# Patient Record
Sex: FEMALE | Race: WHITE | NOT HISPANIC OR LATINO | Employment: FULL TIME | URBAN - METROPOLITAN AREA
[De-identification: names, ages, dates, MRNs, and addresses within clinical notes are randomized per-mention and may not be internally consistent; named-entity substitution may affect disease eponyms.]

---

## 2018-01-24 LAB
CREAT SERPL-MCNC: 0.79 MG/DL (ref 0.58–0.96)
GFR SERPL CREATININE-BSD FRML MDRD: >60 ML/MIN/1.73
GLUCOSE SERPL-MCNC: 79 MG/DL (ref 74–99)
POTASSIUM SERPL-SCNC: 4.4 MMOL/L (ref 3.7–5.1)

## 2018-06-01 LAB
ALT SERPL-CCNC: 28 U/L (ref 7–38)
AST SERPL-CCNC: 26 U/L (ref 13–35)
CREAT SERPL-MCNC: 0.64 MG/DL (ref 0.58–0.96)
GFR SERPL CREATININE-BSD FRML MDRD: >60 ML/MIN/1.73M2
GLUCOSE SERPL-MCNC: 96 MG/DL (ref 74–99)
POTASSIUM SERPL-SCNC: 4.1 MMOL/L (ref 3.7–5.1)

## 2018-06-05 LAB
C TRACH DNA SPEC QL PROBE+SIG AMP: NEGATIVE
N GONORRHOEA DNA SPEC QL PROBE+SIG AMP: NEGATIVE
SPECIMEN DESCRIP: NORMAL
SPECIMEN DESCRIPTION: NORMAL

## 2018-07-12 LAB
HBV SURFACE AG SERPL QL IA: NEGATIVE
HIV 1+2 AB+HIV1 P24 AG SERPL QL IA: NEGATIVE
RUBELLA ABY IGG: NORMAL
TSH SERPL-ACNC: 1.2 UU/ML (ref 0.4–5.5)

## 2018-07-13 LAB — HEP C HIM: NORMAL

## 2018-07-17 ENCOUNTER — TRANSFERRED RECORDS (OUTPATIENT)
Dept: HEALTH INFORMATION MANAGEMENT | Facility: CLINIC | Age: 38
End: 2018-07-17

## 2018-07-17 ENCOUNTER — MEDICAL CORRESPONDENCE (OUTPATIENT)
Dept: HEALTH INFORMATION MANAGEMENT | Facility: CLINIC | Age: 38
End: 2018-07-17

## 2018-07-23 ENCOUNTER — TRANSFERRED RECORDS (OUTPATIENT)
Dept: HEALTH INFORMATION MANAGEMENT | Facility: CLINIC | Age: 38
End: 2018-07-23

## 2018-08-03 ENCOUNTER — PRE VISIT (OUTPATIENT)
Dept: MATERNAL FETAL MEDICINE | Facility: CLINIC | Age: 38
End: 2018-08-03

## 2018-08-05 NOTE — PROGRESS NOTES
Maternal-Fetal Medicine Consultation    Chelsea Stein  : 1980  MRN: [id number]    REFERRAL:  Chelsea Stein is a 38 year old sent by Dr. Laguerre for [facility] for SLE.    HPI:  Chelsea Stein is a 38 year old  at 16w1d by LMP consistent with 7w4d US presenting as a consult for SLE in pregnancy. She and her family just moved from [place] as her  took a faculty Neurosurgery job just after finishing fellowship.    She is doing well today, no complaints. Had nausea/vomiting in her first trimester but is feeling much better now. She has questions today about fetal echos and follow up with MFM vs routine Obstetrics.    She followed with a Rheumatologist in [place], her SLE has been well-controlled on Plaquenil with her last outbreak over two years ago. Notes that in her previous pregnancy she felt better than she does at baseline. No flair in postpartum period. She remembers getting a lot of fetal echos with previous pregnancy, but no fetal/ problems with last pregnancy. She did have a placenta previa with a bleed at 35w and was therefore admitted until delivery by c/s at 36w6d. This was complicated by need for pRBC transfusion x2 units.    Pregnancy complicated by:  - SLE, no renal involvement (Baseline Cr 0.79, Baseline P:Cr 0.1) - 300mg Plaquenil, ASA 81mg   - SSA positive >8.0, MAURICIO positive 1:1280, RNP Ab 7.3 (H), Sm Ab 4.7 (H)   - Joint pain, Raynaud's, Photosensitivity are main symptoms    - No steroid course for over two years   - Known Leukopenia, s/p bone marrow biopsy wnl, thought to be due to SLE   - Last seen by Rheumatology in [place] at 7w   - Leukopenia/Neutropenia with Left shift (2% blasts)   - S/p Hematology and BM Bx in [place], BMBx wnl   - Thought to be due to   - AMA - NIPT wnl  - Cervical polyp 3.5x2cm with occasional postcoital spotting    Prenatal Care:  Primary OB care this pregnancy has been with Dr. Laguerre at [facility]. She and  her family just moved to [place]    Dating:    LMP: 2018, cycles regular  Dating ultrasound: 7w5d (+3d) date 6w4d  GUNJAN: 19 by LMP      Obstetrics History:  Obstetric History       T0      L1     SAB0   TAB0   Ectopic0   Multiple0   Live Births1       # Outcome Date GA Lbr Curt/2nd Weight Sex Delivery Anes PTL Lv   2 Current            1   36w6d    CS-LTranv   JAYNE        Previous delivery - complete previa, bedrest at 35 wk. Had 2U pRBC during time of delivery    Gynecologic History:  - Last Pap: hx of negative paps, thinks last was NIL, in 2016 (no record)  - Denies any history of abnormal pap smears  - Denies prior cervical surgery or procedures  - Denies any history of frequent UTIs, vaginal infections, or STIs    Past Medical History:  Past Medical History:   Diagnosis Date     Lupus (systemic lupus erythematosus) (H)     SSA+       Past Surgical History:  C/s x1  No past surgical history on file.    Current Medications:  Plaquenil 300mg Daily   Aspirin 81mg Daily    Allergies:  Review of patient's allergies indicates not on file.    Social History:   Social History     Social History     Marital status:      Spouse name: N/A     Number of children: N/A     Years of education: N/A     Occupational History     Not on file.     Social History Main Topics     Smoking status: Not on file     Smokeless tobacco: Not on file     Alcohol use Not on file     Drug use: Not on file     Sexual activity: Not on file     Other Topics Concern     Not on file     Social History Narrative     No narrative on file       Family History:  No family history on file.    ROS:  10-point ROS negative except as in HPI     PHYSICAL EXAM:  LMP 2018    Gen: NAD, well appearing  Chest: Non-labored breathing  Extremities: WWP, no edema    Doptones: wnl by US today    Labs:    Lab Results   Component Value Date    CHPCRT Negative 2018    GCPCRT Negative 2018       No results found for:  PAP    Labs: 2018 from OSH  ABO A+, Ab neg  Hgb: 13  HCT: 37.6  MCV: 90.2 Plt: 187  Hep B neg, Hep C neg, HIV neg, Trep: neg, Rubella Immune  GC/Chlam: neg  Urine Cx: neg  Pap/HPV: unknown, per patient NIL,    TSH: 1.2  AST: 26  ALT: 28  Cr: 0.64  UPC: 0.1     Genetic Testing:   Pt reported NIPT wnl, femal    Ultrasounds:   DATE  GA  ASSESSMENT  18  7w5d  Dating  18  16w3d  2/3 Complete: see report for details    ASSESSMENT:  38 year old y.o.  at 16w1d by LMP c/w 7w US with well-controlled SLE on Planquenil.    RECOMMENDATIONS:    Systemic Lupus Erythematosus    The prognosis for both mother and child is best when SLE has been quiescent for at least 6 months prior to the pregnancy, and the patient's underlying renal function is stable and normal or near normal. The major issues with SLE are exacerbation of the disease, superimposed preeclampsia, fetal growth restriction, fetal loss, and  lupus. There may be a slight increase for flares during pregnancy, although the data are conflicting.   There is an increased risk of a lupus flare in the immediate postpartum period.      Positive SSA Antibody:  Another major issue is related to co-existing antibodies which lead to specific complications.  Antibodies to Ro and La are associated with fetal heart block, and antibodies associated with the antiphospholipid antibody syndrome (APS) are associated with poor obstetric outcomes and thrombosis.    Data from several observational studies suggest that antimalarial drugs, such as hydroxycholorquine (Plaquenil), are safe and there is significant experience with their use in pregnancy. Glucocorticoids are also relatively safe though they are associated with hypertension, diabetes and  premature rupture of membranes. Importantly, SLE flares themselves are associated with poor outcome, and the risk of the medications is thought to be less than the risk of a flare itself.      It is sometimes  "difficult to differentiate an SLE flare from preeclampsia therefore baseline testing is important.      Recommendations:    Continue all necessary medications continue Plaquenil    Low dose aspirin for preeclampsia prevention pt already taking    ECG, with echocardiogram if abnormal    Thyroid function (TSH, reflex to free T4) 1.2    Antibodies to Ro and La (SSA, SSB) - pt is SSA positive    Antiphospholipid antibody syndrome testing if other signs and symptoms of APS:  - Lupus anticoagulant negative  - Anticardiolipin antibodies IgG, IgM negative IgG and IgM  - Anti-beta2 glycoprotein 1 antibodies IgG, IgM negative IgG and IgM    Serial ultrasound for fetal growth after 24 weeks    Due to positive SSA antibody and 2-5% risk of fetal heart block. Will need serial fetal echo f/u   - Fetal echo at 18w   - Fetal echo at 20-21w, plan for comprehensive anatomy scan at this visit   - Fetal echo at 25-26w   - q2w limited US for fetal MI interval measurements    Frequent visits to assess blood pressure and urine protein    Weekly  testing with BPP (or NST and MVP) starting at 32 weeks    Delivery by 39-40 weeks - previous c/s. Pt thinks she's desiring TOLAC, which would be acceptable.     Stress dose steroids during labor if chronic steroids are necessary during the pregnancy.    Recommend she establish care with a rheumatologist here     Discussed with Chelsea that she qualifies for home doppler study due to SLE and positive SSA. Dr. Francisco will be in contact with her at next ultrasound.     # Routine Prenatal care  - Will need to establish routine OB care - can follow with routine OB plan for care and plan for US f/u with [facility]    I acted as a scribe for Dr. Mireille Yoder MD  Obstetrics and Gyncology, PGY-2  2018 10:21 AM     Please see \"Imaging\" tab under \"Chart Review\" for details of today's US at the [facility].    Total time spent in face -to face counseling was 15 minutes (>50% for " medical management discussion and plan of care). A copy of this consult was sen to the [facility] office.     This note, as scribed, accurately reflects the examination, my impressions and plan as discussed with the patient.    Black Kendrick MD  Maternal-Fetal Medicine

## 2018-08-07 ENCOUNTER — OFFICE VISIT (OUTPATIENT)
Dept: MATERNAL FETAL MEDICINE | Facility: CLINIC | Age: 38
End: 2018-08-07
Payer: COMMERCIAL

## 2018-08-07 ENCOUNTER — HOSPITAL ENCOUNTER (OUTPATIENT)
Dept: ULTRASOUND IMAGING | Facility: CLINIC | Age: 38
Discharge: HOME | End: 2018-08-07
Admitting: OBSTETRICS & GYNECOLOGY
Payer: COMMERCIAL

## 2018-08-07 DIAGNOSIS — O09.522 SUPERVISION OF ELDERLY MULTIGRAVIDA IN SECOND TRIMESTER: Primary | ICD-10-CM

## 2018-08-07 DIAGNOSIS — O26.90 PREGNANCY RELATED CONDITION, ANTEPARTUM: ICD-10-CM

## 2018-08-07 DIAGNOSIS — M32.9 SYSTEMIC LUPUS ERYTHEMATOSUS AFFECTING PREGNANCY (H): Primary | ICD-10-CM

## 2018-08-07 DIAGNOSIS — R76.8 SS-A ANTIBODY POSITIVE: ICD-10-CM

## 2018-08-07 DIAGNOSIS — O99.891 SYSTEMIC LUPUS ERYTHEMATOSUS AFFECTING PREGNANCY (H): Primary | ICD-10-CM

## 2018-08-07 PROCEDURE — 96040 ZZH GENETIC COUNSELING, EACH 30 MINUTES: CPT | Mod: ZF | Performed by: GENETIC COUNSELOR, MS

## 2018-08-07 PROCEDURE — 76805 OB US >/= 14 WKS SNGL FETUS: CPT

## 2018-08-07 NOTE — MR AVS SNAPSHOT
After Visit Summary   8/7/2018    Chelsea Stein    MRN: [id number]           Patient Information     Date Of Birth          1980        Visit Information        Provider Department      8/7/2018 8:00 AM Alexa Larios GC [facility] Maternal Fetal Medicine - [place]        Today's Diagnoses     Supervision of elderly multigravida in second trimester    -  1    Pregnancy related condition, antepartum           Follow-ups after your visit        Your next 10 appointments already scheduled     Aug 14, 2018  9:00 AM CDT   Ech Fetal Complete* with URFETR1   [facility] Echo/EKG ([facility])    [address] [place] [address]  [facility] [address]               Sep 04, 2018  9:00 AM CDT   Ech Fetal Complete* with URFETR1   [facility] Echo/EKG ([facility])    [address] [place] [address]  [facility] [address]               Sep 04, 2018 10:15 AM CDT   MFM US COMP with URMFMUSR3   [facility] Maternal Fetal Medicine Ultrasound - [facility])    [address]  [facility] [id number] [phone]           Wear comfortable clothes and leave your valuables at home.            Sep 04, 2018 10:45 AM CDT   Radiology MD with FELICIA CONDON MD   [facility] Maternal Fetal Medicine - [facility])    [address]  [facility] [address]   [phone]           Please arrive at the time given for your first appointment. This visit is used internally to schedule the physician's time during your ultrasound.            Sep 17, 2018  9:30 AM CDT   MFM US OB LIMIT SIN/MULT with SHMFMUSR2   [facility] Ultrasound - [facility])    [address] [place] [id number] [phone]           Wear comfortable clothes and leave your valuables at home.            Sep 17, 2018 10:00 AM CDT   Radiology MD with JOSSIE CONDON MD   [facility]  Maternal Fetal Medicine - [facility] ([facility])    [address]  [address]  [facility] [address] [phone]           Please arrive at the time given for your first appointment. This visit is used internally to schedule the physician's time during your ultrasound.            Oct 02, 2018  9:00 AM CDT   Ech Fetal Complete* with URFETR1   [facility] Echo/EKG ([facility])    [address] [place]  [facility] [address]               Oct 02, 2018 10:15 AM CDT   [facility] US COMPRE SINGLE F/U with URMFMUSR1   [facility] Maternal Fetal Medicine Ultrasound - [place] ([facility])    [address]  [facility] [id number] [phone]           Wear comfortable clothes and leave your valuables at home.            Oct 02, 2018 10:45 AM CDT   Radiology MD with UR M MD   [facility] Maternal Fetal Medicine - [place] ([facility])    [address]  [facility] [address]   [phone]           Please arrive at the time given for your first appointment. This visit is used internally to schedule the physician's time during your ultrasound.              Future tests that were ordered for you today     Open Future Orders        Priority Expected Expires Ordered    Echo Fetal Complete-Peds Cardiology Routine  8/7/2019 8/7/2018    Echo Fetal Complete-Peds Cardiology Routine  8/7/2019 8/7/2018    Echo Fetal Complete-Peds Cardiology Routine  8/7/2019 8/7/2018    [facility] US Comprehensive Single Routine  6/7/2019 8/7/2018    [facility] US OB Limited Single/Multiple Routine 9/14/2018 6/7/2019 8/7/2018    [facility] US Comprehensive Single F/U Routine 9/28/2018 8/7/2019 8/7/2018            Who to contact     If you have questions or need follow up information about today's clinic visit or your schedule please contact [facility] MATERNAL FETAL MEDICINE [facility] directly at [phone].  Normal or non-critical lab and  imaging results will be communicated to you by MyChart, letter or phone within 4 business days after the clinic has received the results. If you do not hear from us within 7 days, please contact the clinic through [employer] or phone. If you have a critical or abnormal lab result, we will notify you by phone as soon as possible.  Submit refill requests through "[employer]" or call your pharmacy and they will forward the refill request to us. Please allow 3 business days for your refill to be completed.          Additional Information About Your Visit        "2,10E+07"harZingCheckout Information     "[employer] gives you secure access to your electronic health record. If you see a primary care provider, you can also send messages to your care team and make appointments. If you have questions, please call your primary care clinic.  If you do not have a primary care provider, please call [phone] and they will assist you.        Care EveryWhere ID     This is your Care EveryWhere ID. This could be used by other organizations to access your [facility] medical records  [id number]        Your Vitals Were     Last Period                   04/14/2018            Blood Pressure from Last 3 Encounters:   No data found for BP    Weight from Last 3 Encounters:   No data found for Wt              We Performed the Following     MFM Genetic Counseling        Primary Care Provider    None Specified       No primary provider on file.        Equal Access to Services     HECTOR GAONA : Noe Knox, cristel schumacher, yaima lema, nikki gong. So [facility] [phone].    ATENCIÓN: Si habla español, tiene a parker disposición servicios gratuitos de asistencia lingüística. Llame al [phone].    We comply with applicable federal civil rights laws and [place] laws. We do not discriminate on the basis of race, color, national origin, age, disability, sex, sexual orientation, or gender identity.             Thank you!     Thank you for choosing [facility]  for your care. Our goal is always to provide you with excellent care. Hearing back from our patients is one way we can continue to improve our services. Please take a few minutes to complete the written survey that you may receive in the mail after your visit with us. Thank you!             Your Updated Medication List - Protect others around you: Learn how to safely use, store and throw away your medicines at www.disposemymeds.org.      Notice  As of 8/7/2018  1:28 PM    You have not been prescribed any medications.

## 2018-08-07 NOTE — NURSING NOTE
Chelsea here today with her son for GC/MFM consult and 2/3 complete U/S due to preg c/b SLE, +SSA and AMA. Pt reports no FM yet, denies ctx, denies SROM, and denies vag bleeding.  Pt had [facility]IPT in [facility] and recently moved to [place]. Dr. Kendrick in to see pt. Pt to have fetal echo at 16-18 wks, 20-21 wks and 25-26 wks.  Pt needs comp U/S in 3-4 wk, then limited U/S every 2 weeks checking FL intervals. Pt will get an apt with [facility] and establish prenatal care with [facility]. Pt leaving for vacation from 8/16-8/31.  Pt was scheduled for appointments and left amb and stable. Myrtle Cabezas RN

## 2018-08-07 NOTE — PROGRESS NOTES
[facility]  Genetic Counseling Consult    Patient: Chelsea Stein YOB: 1980   Date of Service: 18      Chelsea Stein was seen at [facility] for genetic consultation as part of her MF consultation today. The indication for genetic counseling is advanced maternal age.        Impression/Plan:   1.  Chelsea had an ultrasound and MFM consultation today. Her family has recently relocated to [place] from [place] and she has come to establish OB care with us due to her history of Lupus. See MFM consultation note for detailed discussion.      2.  Chelsea has had a normal non-invasive prenatal screen (NIPT) via [id number] through her OB provider in [place]. A copy of this result was requested from her OB provider for review and record completion. Per Chelsea's report, they were informed of female sex.    Pregnancy History:   /Parity:    Age at Delivery: 38 year old  GUNJAN: 2019, by Last Menstrual Period  Gestational Age: 16w3d    No significant complications or exposures were reported in the current pregnancy.    Chelsea s pregnancy history is significant for:  o 2014: 36+6,  due to complete previa, male  - Required two blood transfusions at the time of     Medical History:   Chelsea has a personal diagnosis of Lupus with ongoing joint pain, photosensitivity, and Raynaud's. She has not required steroid treatment for over 2 years. Her diagnosis of Lupus was discussed in detail during her MFM consultation. See their note.       Family History:   A three-generation pedigree was obtained, and is scanned under the  Media  tab.   The following significant findings were reported by Chelsea:    hCelsea's partner, Yordan, has a family history of Retinitis Pigmentosa (RP). RP refers to a group of eye disorders that oftentimes begin with loss of night vision and progress to loss of peripheral vision and legal  "blindness. Most individuals with RP begin experiencing symptoms in childhood, however, Chelsea's mother-in-law reportedly did not begin having symptoms until later in life. The most common forms of RP are inherited in an autosomal dominant pattern, meaning only one copy of the gene needs to be altered to have the disorder. Less commonly, RP can be diagnosed with an autosomal recessive pattern or X-linked. Per Chelsea's report, her mother-in-law is the first individual in her family to have RP. Testing was recommended for her close relatives, therefore, Chelsea's  completed testing. Per Chelsea, Yordan was found to be a \"carrier\" for RP but \"is not at risk\" himself for developing symptoms of RP. With the given description today, it sounds as though her mother-in-law developed RP due to autosomal recessive RP and that Yordan is an unaffected carrier of this disorder. In this scenario, there is a chance that Chelsea and Yordan's children could be unaffected carriers for RP or could have RP if Chelsea is also an unaffected carrier.    Otherwise, the reported family history is negative for multiple miscarriages, stillbirths, birth defects, mental retardation, known genetic conditions, and consanguinity.       Carrier Screening:   The patient reports that she and the father of the pregnancy have  ancestry:      Cystic fibrosis is an autosomal recessive genetic condition that occurs with increased frequency in individuals of  ancestry and carrier screening for this condition is available.  In addition,  screening in the [facility] includes cystic fibrosis.      Expanded carrier screening for mutations in a large panel of genes associated with autosomal recessive conditions including cystic fibrosis, spinal muscular atrophy, and others, is now available.      If Chelsea and/or her partner choose to pursue carrier screening, [facility] clinic is available to assist in coordination if desired.      "   Risk Assessment for Chromosome Conditions:   We explained that the risk for fetal chromosome abnormalities increases with maternal age. We discussed specific features of common chromosome abnormalities, including Down syndrome, trisomy 13, trisomy 18, and sex chromosome trisomies. These estimates are no longer accurate given the normal NIPT result.      - At age 38 at midtrimester, the risk to have a baby with Down syndrome is 1 in 168.     - At age 38 at midtrimester, the risk to have a baby with any chromosome abnormality is 1 in 82.     Testing Options:   We discussed the following options:   Genetic Amniocentesis    Invasive procedure typically performed in the second trimester by which amniotic fluid is obtained for the purpose of chromosome analysis and/or other prenatal genetic analysis    Diagnostic results; >99% sensitivity for fetal chromosome abnormalities    AFAFP measurement tests for open neural tube defects     Comprehensive (Level II) ultrasound: Detailed ultrasound performed between 18-22 weeks gestation to screen for major birth defects and markers for aneuploidy.      We reviewed the benefits and limitations of this testing.  Screening tests provide a risk assessment specific to the pregnancy for certain fetal chromosome abnormalities, but cannot definitively diagnose or exclude a fetal chromosome abnormality.  Follow-up genetic counseling and consideration of diagnostic testing is recommended with any abnormal screening result. Diagnostic tests carry inherent risks- including risk of miscarriage- that require careful consideration.  These tests can detect fetal chromosome abnormalities with greater than 99% certainty.  Results can be compromised by maternal cell contamination or mosaicism, and are limited by the resolution of cytogenetic G-banding technology.  There is no screening nor diagnostic test that can detect all forms of birth defects or mental disability.     It was a pleasure to be  involved with Chelsea s care. Face-to-face time of the meeting was 25 minutes.    Lana Larios MS, [facility]  Maternal Fetal Medicine  [facility]  Ph: [phone]  [email or web address]

## 2018-08-07 NOTE — MR AVS SNAPSHOT
After Visit Summary   8/7/2018    Chelsea Stein    MRN: [id number]           Patient Information     Date Of Birth          1980        Visit Information        Provider Department      8/7/2018 9:30 AM Black Kendrick MD [facility] Maternal Fetal Medicine - [place]        Today's Diagnoses     Systemic lupus erythematosus affecting pregnancy (H)    -  1    SS-A antibody positive           Follow-ups after your visit        Your next 10 appointments already scheduled     Aug 14, 2018  9:00 AM CDT   Ech Fetal Complete* with URFETR1   [facility] Echo/EKG ([facility])    [address]  [facility] [address]               Sep 04, 2018  9:00 AM CDT   Ech Fetal Complete* with URFETR1   [facility] Echo[facility]KG ([facility])    [address]  [facility] [address]               Sep 04, 2018 10:15 AM CDT   MFM US COMP with URMFMUSR3   [facility] Ultrasound - [facility])    [address]  [facility] [id number] [phone]           Wear comfortable clothes and leave your valuables at home.            Sep 04, 2018 10:45 AM CDT   Radiology MD with FELICIA CONDON MD   [facility] - [facility])    [address]  [facility] [address]   [phone]           Please arrive at the time given for your first appointment. This visit is used internally to schedule the physician's time during your ultrasound.            Sep 17, 2018  9:30 AM CDT   MFM US OB LIMIT SIN/MULT with SHMFMUSR2   [facility] Ultrasound - [facility])    [address] [place] [id number] [phone]           Wear comfortable clothes and leave your valuables at home.            Sep 17, 2018 10:00 AM CDT   Radiology MD with JOSSIE CONDON MD   [facility] Maternal Fetal Medicine -  [facility]    [address]  [address]  [facility] [address] [phone]           Please arrive at the time given for your first appointment. This visit is used internally to schedule the physician's time during your ultrasound.            Oct 02, 2018  9:00 AM CDT   Ech Fetal Complete* with URFETR1   [facility] Echo[facility]EKG ([facility])    [address]  [facility] [address]               Oct 02, 2018 10:15 AM CDT   [facility] US COMPRE SINGLE F/U with URMFMUSR1   [facility] Maternal Fetal Medicine Ultrasound - [place] ([facility])    [address]  [facility] [phone] [phone]           Wear comfortable clothes and leave your valuables at home.            Oct 02, 2018 10:45 AM CDT   Radiology MD with UR M MD   [facility] Maternal Fetal Medicine - [place] ([facility])    [address]  [facility] [address]   [phone]           Please arrive at the time given for your first appointment. This visit is used internally to schedule the physician's time during your ultrasound.              Future tests that were ordered for you today     Open Future Orders        Priority Expected Expires Ordered    Echo Fetal Complete-Peds Cardiology Routine  8/7/2019 8/7/2018    Echo Fetal Complete-Peds Cardiology Routine  8/7/2019 8/7/2018    Echo Fetal Complete-Peds Cardiology Routine  8/7/2019 8/7/2018    [facility] US Comprehensive Single Routine  6/7/2019 8/7/2018    [facility] US OB Limited Single/Multiple Routine 9/14/2018 6/7/2019 8/7/2018    [facility] US Comprehensive Single F/U Routine 9/28/2018 8/7/2019 8/7/2018            Who to contact     If you have questions or need follow up information about today's clinic visit or your schedule please contact [facility] MATERNAL FETAL MEDICINE [facility] directly at [phone].  Normal or non-critical lab and imaging results will be  communicated to you by fypiohart, letter or phone within 4 business days after the clinic has received the results. If you do not hear from us within 7 days, please contact the clinic through [email or web address] or phone. If you have a critical or abnormal lab result, we will notify you by phone as soon as possible.  Submit refill requests through [employer] or call your pharmacy and they will forward the refill request to us. Please allow 3 business days for your refill to be completed.          Additional Information About Your Visit        fypiohar[employer] Information     [employer] gives you secure access to your electronic health record. If you see a primary care provider, you can also send messages to your care team and make appointments. If you have questions, please call your primary care clinic.  If you do not have a primary care provider, please call [phone] and they will assist you.        Care EveryWhere ID     This is your Care EveryWhere ID. This could be used by other organizations to access your [place] medical records  [id number]        Your Vitals Were     Last Period                   04/14/2018            Blood Pressure from Last 3 Encounters:   No data found for BP    Weight from Last 3 Encounters:   No data found for Wt              We Performed the Following     MFM Office Visit        Primary Care Provider    None Specified       No primary provider on file.        Equal Access to Services     [facility] : Hadii gildardo Knox, cristel schumacher, yaima lema, nikki gong. So [facility] [phone].    ATENCIÓN: Si habla español, tiene a parker disposición servicios gratuitos de asistencia lingüística. Edenilson al [phone].    We comply with applicable federal civil rights laws and [place] laws. We do not discriminate on the basis of race, color, national origin, age, disability, sex, sexual orientation, or gender identity.            Thank you!     Thank you for  choosing [facility]  for your care. Our goal is always to provide you with excellent care. Hearing back from our patients is one way we can continue to improve our services. Please take a few minutes to complete the written survey that you may receive in the mail after your visit with us. Thank you!             Your Updated Medication List - Protect others around you: Learn how to safely use, store and throw away your medicines at www.disposemymeds.org.      Notice  As of 8/7/2018  1:12 PM    You have not been prescribed any medications.

## 2018-08-08 ENCOUNTER — HEALTH MAINTENANCE LETTER (OUTPATIENT)
Age: 38
End: 2018-08-08

## 2018-08-14 ENCOUNTER — HOSPITAL ENCOUNTER (OUTPATIENT)
Dept: CARDIOLOGY | Facility: CLINIC | Age: 38
Discharge: HOME | End: 2018-08-14
Attending: OBSTETRICS & GYNECOLOGY | Admitting: OBSTETRICS & GYNECOLOGY
Payer: COMMERCIAL

## 2018-08-14 DIAGNOSIS — R76.8 SS-A ANTIBODY POSITIVE: ICD-10-CM

## 2018-08-14 PROCEDURE — 76827 ECHO EXAM OF FETAL HEART: CPT

## 2018-09-04 ENCOUNTER — HOSPITAL ENCOUNTER (OUTPATIENT)
Dept: CARDIOLOGY | Facility: CLINIC | Age: 38
Discharge: HOME | End: 2018-09-04
Attending: OBSTETRICS & GYNECOLOGY | Admitting: OBSTETRICS & GYNECOLOGY
Payer: COMMERCIAL

## 2018-09-04 ENCOUNTER — HOSPITAL ENCOUNTER (OUTPATIENT)
Dept: ULTRASOUND IMAGING | Facility: CLINIC | Age: 38
End: 2018-09-04
Attending: OBSTETRICS & GYNECOLOGY
Payer: COMMERCIAL

## 2018-09-04 ENCOUNTER — OFFICE VISIT (OUTPATIENT)
Dept: MATERNAL FETAL MEDICINE | Facility: CLINIC | Age: 38
End: 2018-09-04
Attending: OBSTETRICS & GYNECOLOGY
Payer: COMMERCIAL

## 2018-09-04 DIAGNOSIS — O99.891 SYSTEMIC LUPUS ERYTHEMATOSUS AFFECTING PREGNANCY (H): Primary | ICD-10-CM

## 2018-09-04 DIAGNOSIS — R76.8 SS-A ANTIBODY POSITIVE: ICD-10-CM

## 2018-09-04 DIAGNOSIS — M32.9 SYSTEMIC LUPUS ERYTHEMATOSUS AFFECTING PREGNANCY (H): Primary | ICD-10-CM

## 2018-09-04 PROCEDURE — 76811 OB US DETAILED SNGL FETUS: CPT

## 2018-09-04 PROCEDURE — 76827 ECHO EXAM OF FETAL HEART: CPT

## 2018-09-04 NOTE — PROGRESS NOTES
"Please see \"Imaging\" tab under Chart Review for full details.    Kamila Buenrostro MD  Maternal Fetal Medicine    "

## 2018-09-04 NOTE — MR AVS SNAPSHOT
After Visit Summary   9/4/2018    Chelsea Stein    MRN: [id number]           Patient Information     Date Of Birth          1980        Visit Information        Provider Department      9/4/2018 10:45 AM Kamila Buenrostro MD [facility] Maternal [facility]etal [facility] - [place]        Today's Diagnoses     Systemic lupus erythematosus affecting pregnancy (H)    -  1       Follow-ups after your visit        Your next 10 appointments already scheduled     Sep 17, 2018  9:30 AM CDT   MFM US OB LIMIT SIN/MULT with SHMFMUSR2   [facility] - [facility] ([facility])    [address] [place] [address] [address]  [facility] [id number] [phone]           Wear comfortable clothes and leave your valuables at home.            Sep 17, 2018 10:00 AM CDT   Radiology MD with JOSSIE CONDON MD   [facility] Maternal Fetal Medicine - [facility] ([facility])    [address] [place] [address]  [facility] [id number] [phone]           Please arrive at the time given for your first appointment. This visit is used internally to schedule the physician's time during your ultrasound.            Sep 18, 2018  1:00 PM CDT   Nurse Visit with [facility] Nurse   [facility] ([facility])    [place] [address]  [address]  [facility] [address]   [phone]            Sep 18, 2018  1:30 PM CDT   NEW OB with Jazmine Pal MD   [facility] ([facility])    [place] [facility] [address]  [address]  [facility] [address]   [phone]            Oct 02, 2018  9:00 AM CDT   Ech Fetal Complete* with URFETR1   [facility] Echo/EKG ([facility])    [address] [place] [address]  [facility] [address]               Oct 02, 2018 10:15 AM CDT   MFM US COMPRE SINGLE F/U with URMFMUSR1   [facility] [place] ([facility])    [address]  [facility] [address]   [phone]           Wear comfortable clothes and leave your valuables at home.            Oct 02, 2018 10:45 AM CDT   Radiology MD with UR TAURUS ECKERT   [facility] Maternal Fetal Medicine [facility] ([facility])    [address]  [facility] [address]   [phone]           Please arrive at the time given for your first appointment. This visit is used internally to schedule the physician's time during your ultrasound.              Who to contact     If you have questions or need follow up information about today's clinic visit or your schedule please contact [facility] MATERNAL FETAL MEDICINE [facility] directly at [phone].  Normal or non-critical lab and imaging results will be communicated to you by MyChart, letter or phone within 4 business days after the clinic has received the results. If you do not hear from us within 7 days, please contact the clinic through Betablehart or phone. If you have a critical or abnormal lab result, we will notify you by phone as soon as possible.  Submit refill requests through [employer] or call your pharmacy and they will forward the refill request to us. Please allow 3 business days for your refill to be completed.          Additional Information About Your Visit        [employer] Information     [employer] gives you secure access to your electronic health record. If you see a primary care provider, you can also send messages to your care team and make appointments. If you have questions, please call your primary care clinic.  If you do not have a primary care provider, please call [phone] and they will assist you.        Care EveryWhere ID     This is your Care EveryWhere ID. This could be used by other organizations to access your [place] medical records  [id number]        Your Vitals Were     Last Period                   04/14/2018            Blood Pressure  from Last 3 Encounters:   No data found for BP    Weight from Last 3 Encounters:   No data found for Wt              Today, you had the following     No orders found for display       Primary Care Provider    None Specified       No primary provider on file.        Equal Access to Services     HECTOR GAONA : Hadii aad ku hadalaynamilton Lisette, cristel schumacher, yaima kaminna lema, nikki barnett perlaevelyn schwab thuan gong. So [facility] [phone].    ATENCIÓN: Si habla español, tiene a parker disposición servicios gratuitos de asistencia lingüística. Llame al [phone].    We comply with applicable federal civil rights laws and [place] laws. We do not discriminate on the basis of race, color, national origin, age, disability, sex, sexual orientation, or gender identity.            Thank you!     Thank you for choosing [facility]  for your care. Our goal is always to provide you with excellent care. Hearing back from our patients is one way we can continue to improve our services. Please take a few minutes to complete the written survey that you may receive in the mail after your visit with us. Thank you!             Your Updated Medication List - Protect others around you: Learn how to safely use, store and throw away your medicines at www.disposemymeds.org.      Notice  As of 9/4/2018 11:42 AM    You have not been prescribed any medications.

## 2018-09-04 NOTE — PROGRESS NOTES
Fetal Cardiology Consultation    Patient:  Chelsea Stein MRN:  [id number]   YOB: 1980 Age:  38 year old   Date of Visit:  2018 PCP:  No primary care provider on file.   GUNJAN: 2019, by Last Menstrual Period EGA: 20w3d weeks     Dear Dr. Kendrick:    I had the pleasure of seeing Chelsea Stein at the [facility] in [place] on 2018 in ongoing consultation for fetal echocardiography results. She presented today by herself. As you know, she is a 38 year old female with systemic lupus erythematosis.    The fetal echocardiogram was normal. Normal fetal heart 1:1 atrioventricular rhythm with normal mechanical OR interval. Normal fetal cardiac anatomy. Normal right and left ventricular size and function without hypertrophy. No evidence of diastolic dysfunction. No pericardial effusion.     I reviewed and interpreted the fetal echocardiogram today. I discussed the normal results with Ms. Stein. While these results are normal, it is important to note that fetal echocardiography cannot exclude small atrial or ventricular septal defects, persistent ductus arteriosus, mild coarctation of the aorta, partial anomalous pulmonary venous return, minor anatomic valve anomalies, or coronary artery anomalies.     -- A follow-up fetal echocardiogram is recommended every 2 weeks through 28 weeks, as scheduled.  -- A post-verito 12-lead ECG is recommended.  -- Please contact the fetal cardiology service at [facility] immediately with any irregularity in fetal heart Doppler rhythm assessment, or with sustained bradycardia.    Thank you for allowing me to participate in Ms. Stein's care. Please don't hesitate to contact me or the Fetal Cardiology team at [facility] with any questions or concerns.     I spent a total of 15 minutes face-to-face with Ms. Stein during today's office visit. Over 50% of this time was spent counseling the patient and/or  coordinating care regarding the fetal echocardiography results.     Florentin Dinero  Pediatric Cardiology  [facility]  Phone [phone]

## 2018-09-07 ENCOUNTER — TELEPHONE (OUTPATIENT)
Dept: RHEUMATOLOGY | Facility: CLINIC | Age: 38
End: 2018-09-07

## 2018-09-07 NOTE — TELEPHONE ENCOUNTER
Health Call Center    Phone Message    May a detailed message be left on voicemail: yes    Reason for Call: Other: Pt is being referred to clinic for Lupus per her provider at the [facility]. Records from [facility] are in the chart but pt will have her previous rheumatologist at the [facility] fax her records for review. Please contact pt for scheduling. Thanks!     Action Taken: Message routed to:  [facility] Rheumatology

## 2018-09-17 ENCOUNTER — HOSPITAL ENCOUNTER (OUTPATIENT)
Dept: ULTRASOUND IMAGING | Facility: CLINIC | Age: 38
Discharge: HOME | End: 2018-09-17
Attending: OBSTETRICS & GYNECOLOGY | Admitting: OBSTETRICS & GYNECOLOGY
Payer: COMMERCIAL

## 2018-09-17 ENCOUNTER — OFFICE VISIT (OUTPATIENT)
Dept: MATERNAL FETAL MEDICINE | Facility: CLINIC | Age: 38
End: 2018-09-17
Attending: OBSTETRICS & GYNECOLOGY
Payer: COMMERCIAL

## 2018-09-17 ENCOUNTER — TELEPHONE (OUTPATIENT)
Dept: RHEUMATOLOGY | Facility: CLINIC | Age: 38
End: 2018-09-17

## 2018-09-17 DIAGNOSIS — M32.9 SYSTEMIC LUPUS COMPLICATING PREGNANCY (H): Primary | ICD-10-CM

## 2018-09-17 DIAGNOSIS — O99.891 SYSTEMIC LUPUS COMPLICATING PREGNANCY (H): Primary | ICD-10-CM

## 2018-09-17 DIAGNOSIS — R76.8 SS-A ANTIBODY POSITIVE: ICD-10-CM

## 2018-09-17 PROCEDURE — 76815 OB US LIMITED FETUS(S): CPT

## 2018-09-17 NOTE — MR AVS SNAPSHOT
After Visit Summary   9/17/2018    Chelsea Stein    MRN: [id number]           Patient Information     Date Of Birth          1980        Visit Information        Provider Department      9/17/2018 10:00 AM Gosia Mcclure MD [facility] Maternal Fetal Medicine [facility]        Today's Diagnoses     Systemic lupus complicating pregnancy (H)    -  1       Follow-ups after your visit        Additional Services     RHEUMATOLOGY REFERRAL       Your provider has referred you to: [facility]: Rheumatology Clinic - [place] [phone]   [email or web address]    Please be aware that coverage of these services is subject to the terms and limitations of your health insurance plan.  Call member services at your health plan with any benefit or coverage questions.      Please bring the following with you to your appointment:    (1) Any X-Rays, CTs or MRIs which have been performed.  Contact the facility where they were done to arrange for  prior to your scheduled appointment.    (2) List of current medications   (3) This referral request   (4) Any documents/labs given to you for this referral                  Your next 10 appointments already scheduled     Sep 18, 2018  1:00 PM CDT   Nurse Visit with [facility] Nurse   [facility] ([facility])    [place] [address]  [address]  [facility] [address] [phone]            Sep 18, 2018  1:30 PM CDT   NEW OB with Jazmine Pal MD   [facility] ([facility])    [place] [address]  [address]  [facility] [address] [phone]            Oct 02, 2018  9:00 AM CDT   Ech Fetal Complete* with URFETR1   [facility] Echo/EKG ([facility])    [address]  [facility] [address]               Oct 02, 2018 10:15 AM CDT   MFM US COMPRE SINGLE F/U with URMFMUSR1   [facility]  Maternal Fetal Medicine Ultrasound - [place] ([facility])    [address]  [facility] [id number] [phone]           Wear comfortable clothes and leave your valuables at home.            Oct 02, 2018 10:45 AM CDT   Radiology MD with UR TAURUS ECKERT   [facility] Maternal Fetal Medicine - [place] ([facility])    [address]  [facility] [address]   [phone]           Please arrive at the time given for your first appointment. This visit is used internally to schedule the physician's time during your ultrasound.              Who to contact     If you have questions or need follow up information about today's clinic visit or your schedule please contact [employer] MATERNAL FETAL MEDICINE [facility] directly at [phone].  Normal or non-critical lab and imaging results will be communicated to you by Terapeakhart, letter or phone within 4 business days after the clinic has received the results. If you do not hear from us within 7 days, please contact the clinic through Certesst or phone. If you have a critical or abnormal lab result, we will notify you by phone as soon as possible.  Submit refill requests through [employer] or call your pharmacy and they will forward the refill request to us. Please allow 3 business days for your refill to be completed.          Additional Information About Your Visit        Terapeakhar[employer] Information     [employer] gives you secure access to your electronic health record. If you see a primary care provider, you can also send messages to your care team and make appointments. If you have questions, please call your primary care clinic.  If you do not have a primary care provider, please call [phone] and they will assist you.        Care EveryWhere ID     This is your Care EveryWhere ID. This could be used by other organizations to access your [place] medical records  [id number]        Your Vitals Were      Last Period                   04/14/2018            Blood Pressure from Last 3 Encounters:   No data found for BP    Weight from Last 3 Encounters:   No data found for Wt              We Performed the Following     RHEUMATOLOGY REFERRAL        Primary Care Provider    None Specified       No primary provider on file.        Equal Access to Services     HECTOR GAONA : Hadii aad ku hadkeisha Knox, waerickda luqadaha, yaima kaalmada pita, nikki barnett perlaevelyn schwab laRegimona gong. So [facility] [phone].    ATENCIÓN: Si habla español, tiene a parker disposición servicios gratuitos de asistencia lingüística. Llame al [phone].    We comply with applicable federal civil rights laws and [place] laws. We do not discriminate on the basis of race, color, national origin, age, disability, sex, sexual orientation, or gender identity.            Thank you!     Thank you for choosing [facility]  for your care. Our goal is always to provide you with excellent care. Hearing back from our patients is one way we can continue to improve our services. Please take a few minutes to complete the written survey that you may receive in the mail after your visit with us. Thank you!             Your Updated Medication List - Protect others around you: Learn how to safely use, store and throw away your medicines at www.disposemymeds.org.      Notice  As of 9/17/2018  4:35 PM    You have not been prescribed any medications.

## 2018-09-17 NOTE — NURSING NOTE
Chelsea presents to [facility] for RL2 secondary to SLE. Denies LOF, vaginal bleeding or cramping. She is having some back pain at the end of the day. Positive fetal movement. During her [facility] consult, she was advised to establish care with a Rheumatologist in [place] ([place] recently moved to [place]). She has contacted them but has been unsuccessful in getting an appointment scheduled. I placed the Rheumatology referral and called the [facility] . I opted for the virtual hold and put in patients number for them to call her back. I encouraged Chelsea to call us if she doesn't hear back from a . A follow up echo and US is scheduled in 2 weeks.

## 2018-09-17 NOTE — TELEPHONE ENCOUNTER
[facility] Call Center    Phone Message    May a detailed message be left on voicemail: yes    Reason for Call: Other: Pt calling with a referral for Systemic lupus complicating pregnancy from Dr. Mcclure in family medicine. Writer explained new pt process and informed pt staff would contact in 1-3 weeks for an intake. Pt expressed understanding, and wanted to note she has previously been seen at [facility] in [place], as well as the [facility] in [place]. She will have her records faxed, writer gave referral team number. Please f/u with intake.    Action Taken: Message routed to:  [facility] Rheum

## 2018-09-17 NOTE — PROGRESS NOTES
"Please see \"Imaging\" tab under \"Chart Review\" for details of today's visit.    Gosia Mcclure    "

## 2018-09-18 ENCOUNTER — APPOINTMENT (OUTPATIENT)
Dept: OBGYN | Facility: CLINIC | Age: 38
End: 2018-09-18
Payer: COMMERCIAL

## 2018-09-18 ENCOUNTER — OFFICE VISIT (OUTPATIENT)
Dept: OBGYN | Facility: CLINIC | Age: 38
End: 2018-09-18
Payer: COMMERCIAL

## 2018-09-18 VITALS
HEIGHT: 63 IN | BODY MASS INDEX: 22.15 KG/M2 | HEART RATE: 63 BPM | WEIGHT: 125 LBS | SYSTOLIC BLOOD PRESSURE: 95 MMHG | DIASTOLIC BLOOD PRESSURE: 62 MMHG

## 2018-09-18 DIAGNOSIS — M32.9 SYSTEMIC LUPUS ERYTHEMATOSUS AFFECTING PREGNANCY (H): Primary | ICD-10-CM

## 2018-09-18 DIAGNOSIS — O99.891 SYSTEMIC LUPUS ERYTHEMATOSUS AFFECTING PREGNANCY (H): Primary | ICD-10-CM

## 2018-09-18 LAB
ALBUMIN UR-MCNC: NEGATIVE MG/DL
APPEARANCE UR: CLEAR
BACTERIA #/AREA URNS HPF: ABNORMAL /HPF
BILIRUB UR QL STRIP: NEGATIVE
COLOR UR AUTO: ABNORMAL
CREAT UR-MCNC: 33 MG/DL
GLUCOSE UR STRIP-MCNC: NEGATIVE MG/DL
HGB UR QL STRIP: NEGATIVE
KETONES UR STRIP-MCNC: NEGATIVE MG/DL
LEUKOCYTE ESTERASE UR QL STRIP: ABNORMAL
MUCOUS THREADS #/AREA URNS LPF: PRESENT /LPF
NITRATE UR QL: NEGATIVE
PH UR STRIP: 6.5 PH (ref 5–7)
PROT UR-MCNC: 0.08 G/L
PROT/CREAT 24H UR: 0.23 G/G CR (ref 0–0.2)
RBC #/AREA URNS AUTO: 0 /HPF (ref 0–2)
SOURCE: ABNORMAL
SP GR UR STRIP: 1.01 (ref 1–1.03)
SQUAMOUS #/AREA URNS AUTO: 4 /HPF (ref 0–1)
UROBILINOGEN UR STRIP-MCNC: NORMAL MG/DL (ref 0–2)
WBC #/AREA URNS AUTO: 1 /HPF (ref 0–5)

## 2018-09-18 PROCEDURE — 81001 URINALYSIS AUTO W/SCOPE: CPT | Performed by: OBSTETRICS & GYNECOLOGY

## 2018-09-18 PROCEDURE — 84156 ASSAY OF PROTEIN URINE: CPT | Performed by: OBSTETRICS & GYNECOLOGY

## 2018-09-18 PROCEDURE — G0463 HOSPITAL OUTPT CLINIC VISIT: HCPCS | Mod: ZF

## 2018-09-18 RX ORDER — PRENATAL VIT/IRON FUM/FOLIC AC 27MG-0.8MG
1 TABLET ORAL DAILY
COMMUNITY
End: 2021-08-27

## 2018-09-18 RX ORDER — HYDROXYCHLOROQUINE SULFATE 200 MG/1
TABLET, FILM COATED ORAL
Refills: 0 | Status: ON HOLD | COMMUNITY
Start: 2018-09-10 | End: 2018-11-20

## 2018-09-18 ASSESSMENT — PAIN SCALES - GENERAL: PAINLEVEL: NO PAIN (0)

## 2018-09-18 NOTE — PROGRESS NOTES
"OB Transfer of Care visit  2018     CC: Transferring OB care from [place]    HPI: Chelsea Stein is a 38 year old  at 22w3d by LMP consistent with 7w5d US who presents for her first [facility] clinic visit since moving to [place] 1 month ago from [place]. She has already been seen in the Maternal Fetal Medicine Clinic here because of her SLE with SSA antibodies; recommendations are noted below.     She is doing well today.  Has started to feel fetal movement is \"stronger every day.  In fact, she feels that this pregnancy is less stressful because she does not have a placenta previa. \" She denies contractions cramping, bleeding, leaking of fluid.  She continues to be fairly active running a couple miles every day.  She denies any SLE flare type symptoms, and has not had these in many years.    Endorses that she has been considering ANTOINETTE AC but has not done much reading about this yet..    Pregnancy complicated by:  - SLE, no renal involvement (Baseline Cr 0.79, Baseline P:Cr 0.1) - 300mg Plaquenil, ASA 81mg                         - SSA positive >8.0, MAURICIO positive 1:1280, RNP Ab 7.3 (H), Sm Ab 4.7 (H)                         - Joint pain, Raynaud's, Photosensitivity are main symptoms                          - No steroid course for over two years                         - Last seen by Rheumatology in [place] at 7w   - Leukopenia/Neutropenia with Left shift (2% blasts)                         - S/p Hematology and BM Bx in [place], BMBx wnl                         - Thought to be due to SLE  - AMA - NIPT wnl  - Cervical polyp 3.5x2cm with occasional postcoital spotting  - History of CS x1 for complete previa    - considering TOLAC    Hgb: 13.0 (note WBC 1.5)  ALT:  28  AST:  26   Plt:  183  ABO: A pos  Rae: Neg  HIV:  NR  Hep C: Neg  RPR:  NR  Ru:  Immune  G/C: neg  HBAg: neg    GCT:  Not yet done  GBS: Not yet done  UCx: No growth  TSH  1.2  UP:Cr  0.1  No proteinuria on UA  Pap:  None in chart, NIL " 2016    PMHx  SLE as above, diagnosed age 19    PSHx  CS x1  Orthopedic surgery on arm    Social  , recent arrivals to [place] from [place]  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  Working on licensing.    Feels safe emotionally/physically/financially      Current medications  Plaquenil 300 mg   ASA 81 mg    : Fetal echocardiogram with Dr. Dinero  -- A follow-up fetal echocardiogram is recommended every 2 weeks through 28 weeks, as scheduled.  -- A post- 12-lead ECG is recommended.  -- Please contact the fetal cardiology service at [facility] immediately with any irregularity in fetal heart Doppler rhythm assessment, or with sustained bradycardia.    : Nl anatomy, LA interval. Placenta posterior.       A/P  38 year old  at 22w3d here to establish prenatal care. Pregnancy complicated by     #) SLE: Recommendations per MFM  Continue all necessary medications continue Plaquenil    Low dose aspirin for preeclampsia prevention pt already taking    ECG, with echocardiogram if abnormal    Thyroid function (TSH, reflex to free T4) 1.2    Antibodies to Ro and La (SSA, SSB) - pt is SSA positive    Antiphospholipid antibody syndrome testing if other signs and symptoms of APS:    Lupus anticoagulant negative    Anticardiolipin antibodies IgG, IgM negative IgG and IgM    Anti-beta2 glycoprotein 1 antibodies IgG, IgM negative IgG and IgM    Serial ultrasound for fetal growth after 24 weeks    Due to positive SSA antibody and 2-5% risk of fetal heart block. Will need serial fetal echo f/u * per peds cardiology scheduled every 2 weeks as above* - message sent to Dr. Kendrick and Dr. Dinero                         - Fetal echo at 18w                         - Fetal echo at 20-21w, plan for comprehensive anatomy scan at this visit - normal anatomy, posterior placenta at 20w3d                         - Fetal echo at 25-26w                         - q2w limited US  for fetal TX interval measurements    Frequent visits to assess blood pressure and urine protein    Weekly  testing with BPP (or NST and MVP) starting at 32 weeks    Stress dose steroids during labor if chronic steroids are necessary during the pregnancy.    Recommend she establish care with a rheumatologist here     #) Routine pregnancy   - GCT 24-28w   - No concerns on prenatal labs as above, other than leukopenia, which has been addressed  - normal anatomy, posterior placenta at 20w3d    #) History of CS x1  - Considering TOLAC. Discussed briefly today, plan to readdress early third trimester.     Jazmine Pal MD PGY4  OB/Gyn  2018, 6:41 PM    I agree with note as above. The patient was seen in continuity clinic by the resident doctor.  Assessment and plan were jointly made.  Sammi Kam MD

## 2018-09-18 NOTE — NURSING NOTE
Chief Complaint   Patient presents with     Prenatal Care     CHAVA 22 weeks 3 days   Whit Reynoso LPN

## 2018-09-18 NOTE — MR AVS SNAPSHOT
After Visit Summary   9/18/2018    Chelsea Stein    MRN: [id number]           Patient Information     Date Of Birth          1980        Visit Information        Provider Department      9/18/2018 1:30 PM Jazmine Pal MD [facility]        Today's Diagnoses     Systemic lupus erythematosus affecting pregnancy (H)    -  1       Follow-ups after your visit        Your next 10 appointments already scheduled     Oct 02, 2018  9:00 AM CDT   Ech Fetal Complete* with [id number]   [facility] Echo/EKG ([facility])    [address]  [facility] [address]               Oct 02, 2018 10:15 AM CDT   MFM US COMPRE SINGLE F/U with [id number]   [facility])    [address]  [facility] [id number] [phone]           Wear comfortable clothes and leave your valuables at home.            Oct 02, 2018 10:45 AM CDT   Radiology MD with UR TAURUS ECKERT   [facility])    [address]  [facility] [id number] [phone]           Please arrive at the time given for your first appointment. This visit is used internally to schedule the physician's time during your ultrasound.            Oct 08, 2018 10:00 AM CDT   (Arrive by 9:45 AM)   New Patient Visit with Latoya Cartagena MD   [facility] Rheumatology ([facility])    [address]  [address]  [facility] [id number] [phone]            Oct 16, 2018 10:15 AM CDT   NEW OB with Ashanti Heart MD   [facility] ([facility])    [place] [address]  [address]  [facility] [id number] [phone]              Who to contact     Please call your clinic at [phone] to:    Ask questions about your health    Make or cancel  "appointments    Discuss your medicines    Learn about your test results    Speak to your doctor            Additional Information About Your Visit        [employer] Information     Zam[employer]ee gives you secure access to your electronic health record. If you see a primary care provider, you can also send messages to your care team and make appointments. If you have questions, please call your primary care clinic.  If you do not have a primary care provider, please call [phone] and they will assist you.      Zamzee is an electronic gateway that provides easy, online access to your medical records. With Zamzee, you can request a clinic appointment, read your test results, renew a prescription or communicate with your care team.     To access your existing account, please contact your [facility] or call [phone] for assistance.        Care EveryWhere ID     This is your Care EveryWhere ID. This could be used by other organizations to access your [place] medical records  [id number]        Your Vitals Were     Pulse Height Last Period Breastfeeding? BMI (Body Mass Index)       63 1.6 m (5' 3\") 04/14/2018 No 22.14 kg/m2        Blood Pressure from Last 3 Encounters:   09/18/18 95/62    Weight from Last 3 Encounters:   09/18/18 56.7 kg (125 lb)              We Performed the Following     Creatinine urine calculation only     Protein  random urine with Creat Ratio     Urinalysis        Primary Care Provider    None Specified       No primary provider on file.        Equal Access to Services     HECTOR GAONA : Hadii gildardo Knox, waaxda luqadaha, qaybta kaalmada adeegyada, nikki larose . So [facility] [phone].    ATENCIÓN: Si habla español, tiene a parker disposición servicios gratuitos de asistencia lingüística. Llame al [phone].    We comply with applicable federal civil rights laws and [place] laws. We do not discriminate on the basis of race, " color, national origin, age, disability, sex, sexual orientation, or gender identity.            Thank you!     Thank you for choosing [facility]  for your care. Our goal is always to provide you with excellent care. Hearing back from our patients is one way we can continue to improve our services. Please take a few minutes to complete the written survey that you may receive in the mail after your visit with us. Thank you!             Your Updated Medication List - Protect others around you: Learn how to safely use, store and throw away your medicines at www.disposemymeds.org.          This list is accurate as of 9/18/18 11:59 PM.  Always use your most recent med list.                   Brand Name Dispense Instructions for use Diagnosis    ASPIRIN PO      Take 81 mg by mouth daily        hydroxychloroquine 200 MG tablet    PLAQUENIL     TK ONE AND SS T PO QD        prenatal multivitamin plus iron 27-0.8 MG Tabs per tablet      Take 1 tablet by mouth daily

## 2018-09-24 NOTE — TELEPHONE ENCOUNTER
Lupus Patient Intake Form    Have you ever been diagnosed with SLE, Sjogren's, Connective Tissue disorder (systemic lupus erythematosus)? Yes at age 20 If yes, who diagnosed you? Patient does not remember What are your flare symptoms? Joint pain, rash on face, mouth sores    Have you seen a Rheumatologist in the past?  Yes at [facility] and [facility]     If yes, is this a second opinion or transfer of care? yes    Have you been diagnosed with Fibromyalgia? no    Who manages your care for this issue now? none     What is your most urgent concern at this time? Follow with Rheumatologist due to pregnancy    Have you seen any specialist related to the reason you are coming here? Yes see above    Where are we expecting records (labs, imaging or pathology) from? [facility]    Offered an appointment on 10/8/2018 with Dr. Cartagena, patient accepted    Rebecca Tran CMA  9/24/2018 3:11 PM

## 2018-10-02 ENCOUNTER — HOSPITAL ENCOUNTER (OUTPATIENT)
Dept: ULTRASOUND IMAGING | Facility: CLINIC | Age: 38
End: 2018-10-02
Attending: OBSTETRICS & GYNECOLOGY
Payer: COMMERCIAL

## 2018-10-02 ENCOUNTER — OFFICE VISIT (OUTPATIENT)
Dept: MATERNAL FETAL MEDICINE | Facility: CLINIC | Age: 38
End: 2018-10-02
Attending: OBSTETRICS & GYNECOLOGY
Payer: COMMERCIAL

## 2018-10-02 ENCOUNTER — HOSPITAL ENCOUNTER (OUTPATIENT)
Dept: CARDIOLOGY | Facility: CLINIC | Age: 38
Discharge: HOME | End: 2018-10-02
Attending: OBSTETRICS & GYNECOLOGY | Admitting: OBSTETRICS & GYNECOLOGY
Payer: COMMERCIAL

## 2018-10-02 DIAGNOSIS — R76.8 SS-A ANTIBODY POSITIVE: ICD-10-CM

## 2018-10-02 DIAGNOSIS — M32.9 SYSTEMIC LUPUS ERYTHEMATOSUS AFFECTING PREGNANCY IN SECOND TRIMESTER (H): Primary | ICD-10-CM

## 2018-10-02 DIAGNOSIS — O99.891 SYSTEMIC LUPUS ERYTHEMATOSUS AFFECTING PREGNANCY IN SECOND TRIMESTER (H): Primary | ICD-10-CM

## 2018-10-02 PROCEDURE — 76816 OB US FOLLOW-UP PER FETUS: CPT

## 2018-10-02 PROCEDURE — 76827 ECHO EXAM OF FETAL HEART: CPT

## 2018-10-02 NOTE — PROGRESS NOTES
Fetal Cardiology Consultation    Patient:  Chelsea Stein MRN:  [id number]   YOB: 1980 Age:  38 year old   Date of Visit:  10/2/2018 PCP:  No primary care provider on file.   GUNJAN: 2019, by Last Menstrual Period EGA: 24w3d weeks     Dear Dr. Kendrick:    I had the pleasure of seeing Chelsea Stein at the [facility] Fetal [facility]chocardiography Laboratory in [place] on 10/2/2018 in ongoing consultation for fetal echocardiography results. She presented today accompanied by her mother. As you know, she is a 38 year old female with systemic lupus erythematosis.    The fetal echocardiogram was normal. Normal fetal heart 1:1 atrioventricular rhythm with normal mechanical AK interval. Normal fetal cardiac anatomy. Normal right and left ventricular size and function without hypertrophy. No evidence of diastolic dysfunction. No pericardial effusion.     I reviewed and interpreted the fetal echocardiogram today. I discussed the normal results with Ms. Stein. While these results are normal, it is important to note that fetal echocardiography cannot exclude small atrial or ventricular septal defects, persistent ductus arteriosus, mild coarctation of the aorta, partial anomalous pulmonary venous return, minor anatomic valve anomalies, or coronary artery anomalies.     -- A follow-up fetal ultrasound confirming a normal mechanical AK interval (whether through MFM or pediatric cardiology) is recommended in 2 weeks; this can be her last check during pregnancy barring additional concerns.  -- A post-verito 12-lead ECG is recommended.    Thank you for allowing me to participate in Ms. Stein's care. Please don't hesitate to contact me or the Fetal Cardiology team at [facility] with any questions or concerns.     I spent a total of 10 minutes face-to-face with Ms. Stein during today's office visit. Over 50% of this time was spent counseling the patient and/or coordinating care  regarding the fetal echocardiography results.     Florentin Dinero  Pediatric Cardiology  [facility]  Phone [phone]

## 2018-10-02 NOTE — MR AVS SNAPSHOT
After Visit Summary   10/2/2018    Chelsea Stein    MRN: [id number]           Patient Information     Date Of Birth          1980        Visit Information        Provider Department      10/2/2018 10:45 AM Black Kendrick MD [facility] Maternal Fetal Medicine [facility]        Today's Diagnoses     Systemic lupus erythematosus affecting pregnancy in second trimester (H)    -  1    SS-A antibody positive           Follow-ups after your visit        Your next 10 appointments already scheduled     Oct 02, 2018 10:45 AM CDT   Radiology MD with Black Kendrick MD   [facility] Maternal Fetal Medicine [facility] ([facility])    [address]  [facility] [address]   [phone]           Please arrive at the time given for your first appointment. This visit is used internally to schedule the physician's time during your ultrasound.            Oct 08, 2018 10:00 AM CDT   (Arrive by 9:45 AM)   New Patient Visit with Latoya Cartagena MD   [facility] Rheumatology ([facility])    [address]  [address]  [facility] [id number] [phone]            Oct 16, 2018 10:15 AM CDT   NEW OB with Ashanti Heart MD   [facility] ([facility])    [place] [facility] [address] [address]  [address]  [facility] [id number] [phone]              Future tests that were ordered for you today     Open Future Orders        Priority Expected Expires Ordered    MFM US OB Limited Single/Multiple Routine 10/16/2018 8/2/2019 10/2/2018            Who to contact     If you have questions or need follow up information about today's clinic visit or your schedule please contact [facility]ERNAL FETAL MEDICINE [facility] directly at [phone].  Normal or non-critical lab and imaging results will be communicated to you by MyChart, letter or phone within 4 business days after the clinic has  received the results. If you do not hear from us within 7 days, please contact the clinic through [employer] or phone. If you have a critical or abnormal lab result, we will notify you by phone as soon as possible.  Submit refill requests through StackSafe or call your pharmacy and they will forward the refill request to us. Please allow 3 business days for your refill to be completed.          Additional Information About Your Visit        [employer] Information     StackSafe gives you secure access to your electronic health record. If you see a primary care provider, you can also send messages to your care team and make appointments. If you have questions, please call your primary care clinic.  If you do not have a primary care provider, please call [phone] and they will assist you.        Care EveryWhere ID     This is your Care EveryWhere ID. This could be used by other organizations to access your [place] medical records  [id number]        Your Vitals Were     Last Period                   04/14/2018            Blood Pressure from Last 3 Encounters:   09/18/18 95/62    Weight from Last 3 Encounters:   09/18/18 56.7 kg (125 lb)               Primary Care Provider    None Specified       No primary provider on file.        Equal Access to Services     HECTOR [facility] : Hadii gildardo Knox, cristel schumacher, yaima lema, nikki larose . So [facility] [phone].    ATENCIÓN: Si habla español, tiene a parker disposición servicios gratuitos de asistencia lingüística. Llame al [phone].    We comply with applicable federal civil rights laws and [place] laws. We do not discriminate on the basis of race, color, national origin, age, disability, sex, sexual orientation, or gender identity.            Thank you!     Thank you for choosing [facility]  for your care. Our goal is always to provide you with excellent care. Hearing back from our  patients is one way we can continue to improve our services. Please take a few minutes to complete the written survey that you may receive in the mail after your visit with us. Thank you!             Your Updated Medication List - Protect others around you: Learn how to safely use, store and throw away your medicines at www.disposemymeds.org.          This list is accurate as of 10/2/18 10:32 AM.  Always use your most recent med list.                   Brand Name Dispense Instructions for use Diagnosis    ASPIRIN PO      Take 81 mg by mouth daily        hydroxychloroquine 200 MG tablet    PLAQUENIL     TK ONE AND SS T PO QD        prenatal multivitamin plus iron 27-0.8 MG Tabs per tablet      Take 1 tablet by mouth daily

## 2018-10-02 NOTE — PROGRESS NOTES
"Please see \"Imaging\" tab under \"Chart Review\" for details of today's US at the [facility].    Black Kendrick MD  Maternal-Fetal Medicine      "

## 2018-10-15 ENCOUNTER — OFFICE VISIT (OUTPATIENT)
Dept: RHEUMATOLOGY | Facility: CLINIC | Age: 38
End: 2018-10-15
Attending: STUDENT IN AN ORGANIZED HEALTH CARE EDUCATION/TRAINING PROGRAM
Payer: COMMERCIAL

## 2018-10-15 VITALS
BODY MASS INDEX: 22.62 KG/M2 | WEIGHT: 127.7 LBS | OXYGEN SATURATION: 100 % | DIASTOLIC BLOOD PRESSURE: 65 MMHG | HEART RATE: 89 BPM | SYSTOLIC BLOOD PRESSURE: 97 MMHG | HEIGHT: 63 IN | TEMPERATURE: 97.8 F

## 2018-10-15 DIAGNOSIS — M35.00 SICCA SYNDROME (H): Primary | ICD-10-CM

## 2018-10-15 DIAGNOSIS — Z51.81 MEDICATION MONITORING ENCOUNTER: ICD-10-CM

## 2018-10-15 DIAGNOSIS — M32.9 SYSTEMIC LUPUS COMPLICATING PREGNANCY (H): ICD-10-CM

## 2018-10-15 DIAGNOSIS — O99.891 SYSTEMIC LUPUS COMPLICATING PREGNANCY (H): ICD-10-CM

## 2018-10-15 DIAGNOSIS — Z23 ENCOUNTER FOR IMMUNIZATION: ICD-10-CM

## 2018-10-15 PROCEDURE — G0463 HOSPITAL OUTPT CLINIC VISIT: HCPCS | Mod: 25,ZF

## 2018-10-15 PROCEDURE — G0008 ADMIN INFLUENZA VIRUS VAC: HCPCS | Mod: ZF

## 2018-10-15 PROCEDURE — 90686 IIV4 VACC NO PRSV 0.5 ML IM: CPT | Mod: ZF | Performed by: STUDENT IN AN ORGANIZED HEALTH CARE EDUCATION/TRAINING PROGRAM

## 2018-10-15 PROCEDURE — 25000128 H RX IP 250 OP 636: Mod: ZF | Performed by: STUDENT IN AN ORGANIZED HEALTH CARE EDUCATION/TRAINING PROGRAM

## 2018-10-15 RX ORDER — HYDROXYCHLOROQUINE SULFATE 200 MG/1
TABLET, FILM COATED ORAL
Qty: 130 TABLET | Refills: 3 | Status: SHIPPED | OUTPATIENT
Start: 2018-10-15 | End: 2019-06-10

## 2018-10-15 RX ORDER — FLUORIDE TOOTHPASTE
15 TOOTHPASTE DENTAL 4 TIMES DAILY PRN
Status: DISCONTINUED | OUTPATIENT
Start: 2018-10-15 | End: 2018-10-15

## 2018-10-15 RX ORDER — HYDROXYCHLOROQUINE SULFATE 200 MG/1
400 TABLET, FILM COATED ORAL DAILY
Qty: 60 TABLET | Refills: 3 | Status: SHIPPED | OUTPATIENT
Start: 2018-10-15 | End: 2018-10-15

## 2018-10-15 RX ADMIN — INFLUENZA A VIRUS A/MICHIGAN/45/2015 X-275 (H1N1) ANTIGEN (FORMALDEHYDE INACTIVATED), INFLUENZA A VIRUS A/SINGAPORE/INFIMH-16-0019/2016 IVR-186 (H3N2) ANTIGEN (FORMALDEHYDE INACTIVATED), INFLUENZA B VIRUS B/PHUKET/3073/2013 ANTIGEN (FORMALDEHYDE INACTIVATED), AND INFLUENZA B VIRUS B/MARYLAND/15/2016 BX-69A ANTIGEN (FORMALDEHYDE INACTIVATED) 0.5 ML: 15; 15; 15; 15 INJECTION, SUSPENSION INTRAMUSCULAR at 12:27

## 2018-10-15 ASSESSMENT — PAIN SCALES - GENERAL: PAINLEVEL: NO PAIN (0)

## 2018-10-15 NOTE — LETTER
Date:November 5, 2018      Patient was self referred, no letter generated. Do not send.        [facility] Information

## 2018-10-15 NOTE — PATIENT INSTRUCTIONS
Welcome to [place] and congrats on your pregnancy! Today you have established care. You have a new prescription for plaquenil. Please have your labs drawn today. Please call or Mychart us if you notice any evidence of lupus flare. We will schedule you for future labs to be done sometime in January (you can get them done at [place]).    Labs today and again in early Jan     Eye exam in early March, refer to eye clinic    Return in early March

## 2018-10-15 NOTE — MR AVS SNAPSHOT
After Visit Summary   10/15/2018    Chelsea Stein    MRN: [id number]           Patient Information     Date Of Birth          1980        Visit Information        Provider Department      10/15/2018 11:00 AM Latoya Cartagena MD [facility] Rheumatology        Today's Diagnoses     Sicca syndrome (H)    -  1    Systemic lupus complicating pregnancy (H)        Encounter for immunization        Medication monitoring encounter          Care Instructions    Welcome to [place] and congrats on your pregnancy! Today you have established care. You have a new prescription for plaquenil. Please have your labs drawn today. Please call or Mychart us if you notice any evidence of lupus flare. We will schedule you for future labs to be done sometime in January (you can get them done at [facility]).    Labs today and again in early Jan     Eye exam in early March, refer to eye clinic    Return in early March              Follow-ups after your visit        Additional Services     OPHTHALMOLOGY ADULT REFERRAL       Your provider has referred you to: Dr Hale or Daisy for HCQ monitoring    Please be aware that coverage of these services is subject to the terms and limitations of your health insurance plan.  Call member services at your health plan with any benefit or coverage questions.      Please bring the following with you to your appointment:    (1) Any X-Rays, CTs or MRIs which have been performed.  Contact the facility where they were done to arrange for  prior to your scheduled appointment.    (2) List of current medications  (3) This referral request   (4) Any documents/labs given to you for this referral                  Follow-up notes from your care team     Return in about 5 months (around 3/15/2019) for Routine Visit - lupus.      Your next 10 appointments already scheduled     Nov 13, 2018  3:00 PM CST   RETURN OB with Nori Grimm MD   [facility])    [facility] [address] [address]  [address]  [address] [facility] [address]   [phone]            Nov 20, 2018 10:00 AM CST   (Arrive by 9:45 AM)   US FETAL BIOPHYS PROFILE W/O NON STRESS TEST with [id number]   [facility] ([facility])    [facility]  3rd Floor, [address]  [address] [facility] [address]   [phone]           How do I prepare for my exam? (Food and drink instructions) Drink four 8-ounce glasses of fluid an hour before your exam. If you need to empty your bladder before your exam, try to release only a little urine. Then, drink another glass of fluid.  How do I prepare for my exam? (Other instructions) You may have up to two family members in the exam room. If you bring a small child, an adult must be there to care for him or her. No video or camera photography during the procedure.  What should I wear: Wear comfortable clothes.  How long does the exam take: Most ultrasounds take 30 to 60 minutes.  What should I bring: Bring a list of your medicines, including vitamins, minerals and over-the-counter drugs. It is safest to leave personal items at home.  Do I need a :  No  is needed.  What do I need to tell my doctor: Tell your doctor about any allergies you may have.  What should I do after the exam: No restrictions, You may resume normal activities.  What is this test: An ultrasound uses sound waves to make pictures of the body. Sound waves do not cause pain. The only discomfort may be the pressure of the wand against your skin or full bladder.  Who should I call with questions: If you have any questions, please call the Imaging Department where you will have your exam. Directions, parking instructions, and other information is available on our website, [email or web address].            Mar 04, 2019  8:30 AM CST   (Arrive by 8:15 AM)   Return Visit with MD BAO Hunter  "[facility] Rheumatology ([facility])    [address]  [address]  [facility] [id number] [phone]            Mar 04, 2019  9:30 AM CST   NEW RETINA with Clare Montano MD   Eye Clinic ([facility])    [address]  [address]  [facility] [id number] [phone]              Who to contact     If you have questions or need follow up information about today's clinic visit or your schedule please contact [facility] RHEUMATOLOGY directly at [phone].  Normal or non-critical lab and imaging results will be communicated to you by [employer], letter or phone within 4 business days after the clinic has received the results. If you do not hear from us within 7 days, please contact the clinic through [employer] or phone. If you have a critical or abnormal lab result, we will notify you by phone as soon as possible.  Submit refill requests through Yasuu or call your pharmacy and they will forward the refill request to us. Please allow 3 business days for your refill to be completed.          Additional Information About Your Visit        [employer]ilharWundrbar Information     Yasuu gives you secure access to your electronic health record. If you see a primary care provider, you can also send messages to your care team and make appointments. If you have questions, please call your primary care clinic.  If you do not have a primary care provider, please call [phone] and they will assist you.        Care EveryWhere ID     This is your Care EveryWhere ID. This could be used by other organizations to access your [place] medical records  [id number]        Your Vitals Were     Pulse Temperature Height Last Period Pulse Oximetry BMI (Body Mass Index)    89 97.8  F (36.6  C) (Oral) 1.6 m (5' 3\") 04/14/2018 100% 22.62 kg/m2       Blood Pressure from Last 3 Encounters:   10/30/18 (!) 86/53   10/16/18 92/66   10/15/18 97/65    Weight from Last 3 " Encounters:   10/30/18 58.4 kg (128 lb 12.8 oz)   10/16/18 58 kg (127 lb 12.8 oz)   10/15/18 57.9 kg (127 lb 11.2 oz)              We Performed the Following     OPHTHALMOLOGY ADULT REFERRAL          Today's Medication Changes          These changes are accurate as of 10/15/18 11:59 PM.  If you have any questions, ask your nurse or doctor.               These medicines have changed or have updated prescriptions.        Dose/Directions    * hydroxychloroquine 200 MG tablet   Commonly known as:  PLAQUENIL   This may have changed:  Another medication with the same name was added. Make sure you understand how and when to take each.   Changed by:  Latoya Cartagena MD        TK ONE AND SS T PO QD   Refills:  0       * hydroxychloroquine 200 MG tablet   Commonly known as:  PLAQUENIL   This may have changed:  You were already taking a medication with the same name, and this prescription was added. Make sure you understand how and when to take each.   Used for:  Systemic lupus complicating pregnancy (H)   Changed by:  Latoya Cartagena MD        300 mg/day (1.5 pills) Annual Plaquenil toxicity eye screening required.   Quantity:  130 tablet   Refills:  3       * Notice:  This list has 2 medication(s) that are the same as other medications prescribed for you. Read the directions carefully, and ask your doctor or other care provider to review them with you.         Where to get your medicines      These medications were sent to [facility] [address] [place] [address] [address]  [address] [place] [address]    Hours:  24-hours Phone:  [phone]     hydroxychloroquine 200 MG tablet                Primary Care Provider    None Specified       No primary provider on file.        Equal Access to Services     [facility] : Noe Knox, cristel schumacher, nikki carey. So [facility] [phone].    ATENCIÓN: Si  andrey harkins, tiene a parker disposición servicios gratuitos de asistencia lingüística. Edenilson meza [phone].    We comply with applicable federal civil rights laws and [place] laws. We do not discriminate on the basis of race, color, national origin, age, disability, sex, sexual orientation, or gender identity.            Thank you!     Thank you for choosing [facility] RHEUMATOLOGY  for your care. Our goal is always to provide you with excellent care. Hearing back from our patients is one way we can continue to improve our services. Please take a few minutes to complete the written survey that you may receive in the mail after your visit with us. Thank you!             Your Updated Medication List - Protect others around you: Learn how to safely use, store and throw away your medicines at www.disposemymeds.org.          This list is accurate as of 10/15/18 11:59 PM.  Always use your most recent med list.                   Brand Name Dispense Instructions for use Diagnosis    ASPIRIN PO      Take 81 mg by mouth daily        * hydroxychloroquine 200 MG tablet    PLAQUENIL     TK ONE AND SS T PO QD        * hydroxychloroquine 200 MG tablet    PLAQUENIL    130 tablet    300 mg/day (1.5 pills) Annual Plaquenil toxicity eye screening required.    Systemic lupus complicating pregnancy (H)       IRON SUPPLEMENT PO      Take 65 mg by mouth daily        prenatal multivitamin plus iron 27-0.8 MG Tabs per tablet      Take 1 tablet by mouth daily        * Notice:  This list has 2 medication(s) that are the same as other medications prescribed for you. Read the directions carefully, and ask your doctor or other care provider to review them with you.

## 2018-10-15 NOTE — LETTER
10/15/2018      RE: Chelsea Stein  [address]  [address]  [place] [address]       Rheumatology Clinic - Fellow  Date: 10/15/18    Referral for: transfer of care, lupus, pregnat  Referred by: Dr. Scott Dorsey    HPI: 39 yo F [id number] with lupus who is transferring her care from [facility] and [facility]. She was diagnosed at 18/18 yo initially with raynaud's then in her 20s had a full flare and was diagnosed with lupus. Currently she is almost entirely asymptomatic on plaquenil though she notes significant dry mouth since about 1-2 weeks ago. She finds herself drinking frequently throughout the day and still her mouth feels dry. Her eyes do not feel dry.    She has not required steroids since 2016 though her disease is very steroid responsive. C4 levels and dsDNA levels do correspond to flares per patient. Chelsea is pregnant with her second child due  (she is at 26 weeks). Her first pregnancy went smoothly except for placenta previa requiring  at 36.5 weeks. Before she moved her persistent neutropenia was evaluated via BM biopsy which was unremarkable.    Nature of her lupus flare includes symptoms of: mild alopecia, joint pain, malar rash, oral ulcers, photosensitivity, raynaud's. No previous history of other rashes, dysphagia, pleurisy, chest pain, miscarriages, preeclampsia, eclampsia. She denies recent fevers, chills, pain, vision changes.    Previous management: Dr. Tiffany Parkinson ([place]) then Dr. Conde ([facility])  Note that we have care everywhere for the latter and have yet to receive records from the former.    ROS: Lupus review of systems completed and entirely negative at this time except for mild raynaud's symptoms.    No Known Allergies     Current Outpatient Prescriptions   Medication     ASPIRIN PO     Ferrous Sulfate (IRON SUPPLEMENT PO)     hydroxychloroquine (PLAQUENIL) 200 MG tablet     hydroxychloroquine (PLAQUENIL) 200 MG tablet     Prenatal Vit-Fe  "Fumarate-FA (PRENATAL MULTIVITAMIN PLUS IRON) 27-0.8 MG TABS per tablet     No current facility-administered medications for this visit.      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, nonsmoker, occasional drinker not at this time though, no drug use    Physical Exam:  BP 97/65  Pulse 89  Temp 97.8  F (36.6  C) (Oral)  Ht 1.6 m (5' 3\")  Wt 57.9 kg (127 lb 11.2 oz)  LMP 2018  SpO2 100%  BMI 22.62 kg/m2   General: NAD, very pleasant  HEENT: face symmetric, dry mucosa with decreased saliva pool despite drinking water within the last hour, lacrimal pool adequate  Lymph: no submandibular, cervical, supraclavicular LAD  CV: rrr, no mrg  Resp: CTAB  Abd: pregnant, nt, nd  MSK: no weakness, gait wnl, full active ROM of all joints without pain  Skin: no rashes, no malar rash, no petechia, no bruising  Psych: congruent mood and affect    Data reviewed in care everywhere and in EPIC    Assessment:  39 yo F with lupus here to transfer care, is pregnant, has new sicca symptoms.    Diagnosis:  1. SLE (MAURICIO+ 1:1280 speckled, Sm+, RNP+, SSA+, Chromatin Ab+, APS negative, low C4 which corresponds to flares as does dsDNA per patient, with raynaud's, arthralgias, ?malar rash, alopecia, oral ulcers, leukopenia)  2. Sicca symptoms (dry mouth)  3. Pregnancy, high risk, SSA+  4. Drug monitoring: Plaquenil long term use    Plan:  1. Continue plaquenil 300 mg daily.   Plaquenil started at 18 yo   Prescription renewed   Last eye exam: 18, no signs of plaquenil toxicity  2. High risk pregnancy, SSA+, no antiphospholipids, previous pregnancy without complications due to lupus   Has been seen by MFM   Agree with serial ultrasounds due to risk for heart block with SSA  3. Disease Monitoring   Last Upr/cr = 0.23 (mildly elevated) " (9/18/18)   C3, C4, dsDNA, CRP, ESR, CBC w/diff, LFTs q3mo   CH50 monitoring during pregnancy  4. Start biotene swish and swallow for dry mouth. Secretagogues are contraindicated in pregnancy.    Labs tomorrow per patient preference and in early January  RTC in early march, sooner if needed    Patient staffed with Dr. Westfall.    Latoya Cartagena MD, PhD  Rheumatology Fellow    Attending Note: I saw and evaluated the patient with Dr. Cartagena. I agree with the assessment and plan.    Corrina Westfall MD    Orders Placed This Encounter   Procedures     CBC with platelets differential     AST     ALT     Albumin level     Creatinine     CRP inflammation     Erythrocyte sedimentation rate auto     UA with Microscopic reflex to Culture     Complement C3     Complement C4     DNA double stranded antibodies     Protein  random urine with Creat Ratio     Uric acid     CH50 Complement total     OPHTHALMOLOGY ADULT REFERRAL           Latoya Cartagena MD

## 2018-10-15 NOTE — NURSING NOTE
"Injectable Influenza Immunization Documentation    1.  Has the patient received the information for the injectable influenza vaccine? YES     2. Is the patient 6 months of age or older? YES     3. Does the patient have any of the following contraindications?         Severe allergy to eggs? No     Severe allergic reaction to previous influenza vaccines? No   Severe allergy to latex? No       History of Guillain-Nassawadox syndrome? No     Currently have a temperature greater than 100.4F? No        4.  Severely egg allergic patients should have flu vaccine eligibility assessed by an MD, RN, or pharmacist, and those who received flu vaccine should be observed for 15 min by an MD, RN, Pharmacist, Medical Technician, or member of clinic staff.\": YES    5. Latex-allergic patients should be given latex-free influenza vaccine Yes. Please reference the Vaccine latex table to determine if your clinic s product is latex-containing.       Vaccination given by Chelsea Lopez [facility]  10/15/2018 12:26 PM            "

## 2018-10-15 NOTE — PROGRESS NOTES
Rheumatology Clinic - Fellow  Date: 10/15/18    Referral for: transfer of care, lupus, pregnat  Referred by: Dr. Scott Dorsey    HPI: 37 yo F [id number] with lupus who is transferring her care from [facility] and [facility]. She was diagnosed at 18/20 yo initially with raynaud's then in her 20s had a full flare and was diagnosed with lupus. Currently she is almost entirely asymptomatic on plaquenil though she notes significant dry mouth since about 1-2 weeks ago. She finds herself drinking frequently throughout the day and still her mouth feels dry. Her eyes do not feel dry.    She has not required steroids since 2016 though her disease is very steroid responsive. C4 levels and dsDNA levels do correspond to flares per patient. Chelsea is pregnant with her second child due  (she is at 26 weeks). Her first pregnancy went smoothly except for placenta previa requiring  at 36.5 weeks. Before she moved her persistent neutropenia was evaluated via BM biopsy which was unremarkable.    Nature of her lupus flare includes symptoms of: mild alopecia, joint pain, malar rash, oral ulcers, photosensitivity, raynaud's. No previous history of other rashes, dysphagia, pleurisy, chest pain, miscarriages, preeclampsia, eclampsia. She denies recent fevers, chills, pain, vision changes.    Previous management: Dr. Tiffany Parkinson ([place]) then Dr. Conde ([facility])  Note that we have care everywhere for the latter and have yet to receive records from the former.'    Has mild raynaud's.    ROS: A comprehensive ROS was done. Positives are per HPI.      No Known Allergies     Current Outpatient Prescriptions   Medication     ASPIRIN PO     Ferrous Sulfate (IRON SUPPLEMENT PO)     hydroxychloroquine (PLAQUENIL) 200 MG tablet     hydroxychloroquine (PLAQUENIL) 200 MG tablet     Prenatal Vit-Fe Fumarate-FA (PRENATAL MULTIVITAMIN PLUS IRON) 27-0.8 MG TABS per tablet     No current facility-administered  "medications for this visit.      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, nonsmoker, occasional drinker not at this time though, no drug use    Physical Exam:  BP 97/65  Pulse 89  Temp 97.8  F (36.6  C) (Oral)  Ht 1.6 m (5' 3\")  Wt 57.9 kg (127 lb 11.2 oz)  LMP 2018  SpO2 100%  BMI 22.62 kg/m2   General: NAD, very pleasant  HEENT: face symmetric, dry mucosa with decreased saliva pool despite drinking water within the last hour, lacrimal pool adequate  Lymph: no submandibular, cervical, supraclavicular LAD  CV: rrr, no mrg  Resp: CTAB  Abd: pregnant, nt, nd  MSK: no weakness, gait wnl, full active ROM of all joints without pain  Skin: no rashes, no malar rash, no petechia, no bruising  Psych: congruent mood and affect    Data reviewed in care everywhere and in EPIC    Assessment:  39 yo F with lupus here to transfer care, is pregnant, has new sicca symptoms.    Diagnosis:  1. SLE (MAURICIO+ 1:1280 speckled, Sm+, RNP+, SSA+, Chromatin Ab+, APS negative, low C4 which corresponds to flares as does dsDNA per patient, with raynaud's, arthralgias, ?malar rash, alopecia, oral ulcers, leukopenia)  2. Sicca symptoms (dry mouth)  3. Pregnancy, high risk, SSA+  4. Drug monitoring: Plaquenil long term use    Plan:  1. Continue plaquenil 300 mg daily.   Plaquenil started at 20 yo   Prescription renewed   Last eye exam: 18, no signs of plaquenil toxicity  2. High risk pregnancy, SSA+, no antiphospholipids, previous pregnancy without complications due to lupus   Has been seen by MFM   Agree with serial ultrasounds due to risk for heart block with SSA  3. Disease Monitoring   Last Upr/cr = 0.23 (mildly elevated) (18)   C3, C4, dsDNA, CRP, ESR, CBC w/diff, LFTs q3mo   CH50 monitoring during pregnancy  4. Start " biotene swish and swallow for dry mouth. Secretagogues are contraindicated in pregnancy.    Labs tomorrow per patient preference and in early January  RTC in early march, sooner if needed    Patient staffed with Dr. Westfall.    Latoya Cartagena MD, PhD  Rheumatology Fellow    Attending Note: I saw and evaluated the patient with Dr. Cartagena. I agree with the assessment and plan.    Corrina Westfall MD    Orders Placed This Encounter   Procedures     CBC with platelets differential     AST     ALT     Albumin level     Creatinine     CRP inflammation     Erythrocyte sedimentation rate auto     UA with Microscopic reflex to Culture     Complement C3     Complement C4     DNA double stranded antibodies     Protein  random urine with Creat Ratio     Uric acid     CH50 Complement total     OPHTHALMOLOGY ADULT REFERRAL

## 2018-10-16 ENCOUNTER — OFFICE VISIT (OUTPATIENT)
Dept: OBGYN | Facility: CLINIC | Age: 38
End: 2018-10-16
Attending: OBSTETRICS & GYNECOLOGY
Payer: COMMERCIAL

## 2018-10-16 VITALS
HEIGHT: 63 IN | BODY MASS INDEX: 22.64 KG/M2 | DIASTOLIC BLOOD PRESSURE: 66 MMHG | WEIGHT: 127.8 LBS | SYSTOLIC BLOOD PRESSURE: 92 MMHG | HEART RATE: 94 BPM

## 2018-10-16 DIAGNOSIS — Z34.92 PRENATAL CARE IN SECOND TRIMESTER: ICD-10-CM

## 2018-10-16 DIAGNOSIS — Z34.92 PRENATAL CARE IN SECOND TRIMESTER: Primary | ICD-10-CM

## 2018-10-16 DIAGNOSIS — D70.8 OTHER NEUTROPENIA (H): ICD-10-CM

## 2018-10-16 DIAGNOSIS — N84.1 CERVICAL POLYP: ICD-10-CM

## 2018-10-16 DIAGNOSIS — O09.529 SUPERVISION OF HIGH-RISK PREGNANCY OF ELDERLY MULTIGRAVIDA: Primary | ICD-10-CM

## 2018-10-16 LAB — GLUCOSE 1H P 50 G GLC PO SERPL-MCNC: 106 MG/DL (ref 60–129)

## 2018-10-16 PROCEDURE — 36415 COLL VENOUS BLD VENIPUNCTURE: CPT | Performed by: OBSTETRICS & GYNECOLOGY

## 2018-10-16 PROCEDURE — 82950 GLUCOSE TEST: CPT | Performed by: OBSTETRICS & GYNECOLOGY

## 2018-10-16 PROCEDURE — G0463 HOSPITAL OUTPT CLINIC VISIT: HCPCS

## 2018-10-16 NOTE — MR AVS SNAPSHOT
After Visit Summary   10/16/2018    Chelsea Stein    MRN: [id number]           Patient Information     Date Of Birth          1980        Visit Information        Provider Department      10/16/2018 10:15 AM Ashanti Heart MD [facility]        Today's Diagnoses     Supervision of high-risk pregnancy of elderly multigravida    -  1    Cervical polyp        Other neutropenia (H)           Follow-ups after your visit        Your next 10 appointments already scheduled     Oct 18, 2018  9:30 AM CDT   MFM US OB LIMIT SIN/MULT with [email or web address]   ealth Maternal Fetal Medicine Ultrasound - [facility] ([facility])    [address] [place] [address]  [facility] [id number] [phone]           Wear comfortable clothes and leave your valuables at home.            Oct 18, 2018 10:00 AM CDT   Radiology [facility]   ealth Maternal Fetal Medicine - [facility])    [address] [place] [address]  [facility] [id number] [phone]           Please arrive at the time given for your first appointment. This visit is used internally to schedule the physician's time during your ultrasound.            Oct 30, 2018  9:30 AM CDT   RETURN OB with Maci Bailey MD   [facility] ([facility])    [facility] [facility] [address]  [address]  [facility] [address] [phone]            Mar 04, 2019  8:30 AM CST   (Arrive by 8:15 AM)   Return Visit with Latoya Cartagena MD   [facility] Rheumatology ([facility])    [address]  [address]  [facility] [id number] [phone]            Mar 04, 2019  9:30 AM CST   NEW RETINA with Clare Montano MD   [facility] ([facility])    [address]  9[address] Clin [address]  [facility] [id number] [phone]              Who to contact     Please call  "your clinic at [phone] to:    Ask questions about your health    Make or cancel appointments    Discuss your medicines    Learn about your test results    Speak to your doctor            Additional Information About Your Visit        PictureMenuhart Information     [employer] gives you secure access to your electronic health record. If you see a primary care provider, you can also send messages to your care team and make appointments. If you have questions, please call your primary care clinic.  If you do not have a primary care provider, please call [phone] and they will assist you.      [employer] is an electronic gateway that provides easy, online access to your medical records. With [employer], you can request a clinic appointment, read your test results, renew a prescription or communicate with your care team.     To access your existing account, please contact your [facility] or call [phone] for assistance.        Care EveryWhere ID     This is your Care EveryWhere ID. This could be used by other organizations to access your [place] medical records  [id number]        Your Vitals Were     Pulse Height Last Period BMI (Body Mass Index)          94 1.6 m (5' 3\") 04/14/2018 22.64 kg/m2         Blood Pressure from Last 3 Encounters:   10/16/18 92/66   10/15/18 97/65   09/18/18 95/62    Weight from Last 3 Encounters:   10/16/18 58 kg (127 lb 12.8 oz)   10/15/18 57.9 kg (127 lb 11.2 oz)   09/18/18 56.7 kg (125 lb)              Today, you had the following     No orders found for display       Primary Care Provider    None Specified       No primary provider on file.        Equal Access to Services     HECTOR GAONA : Hadbrunilda Knox, waaxda luqadaha, qaybta kaalmada nikki lema. So [facility] [phone].    ATENCIÓN: Si habla español, tiene a parker disposición servicios gratuitos de asistencia lingüística. Llame al [phone].    We " comply with applicable federal civil rights laws and [place] laws. We do not discriminate on the basis of race, color, national origin, age, disability, sex, sexual orientation, or gender identity.            Thank you!     Thank you for choosing [facility]  for your care. Our goal is always to provide you with excellent care. Hearing back from our patients is one way we can continue to improve our services. Please take a few minutes to complete the written survey that you may receive in the mail after your visit with us. Thank you!             Your Updated Medication List - Protect others around you: Learn how to safely use, store and throw away your medicines at www.disposemymeds.org.          This list is accurate as of 10/16/18 11:59 PM.  Always use your most recent med list.                   Brand Name Dispense Instructions for use Diagnosis    ASPIRIN PO      Take 81 mg by mouth daily        * hydroxychloroquine 200 MG tablet    PLAQUENIL     TK ONE AND SS T PO QD        * hydroxychloroquine 200 MG tablet    PLAQUENIL    130 tablet    300 mg/day (1.5 pills) Annual Plaquenil toxicity eye screening required.    Systemic lupus complicating pregnancy (H)       IRON SUPPLEMENT PO      Take 65 mg by mouth daily        prenatal multivitamin plus iron 27-0.8 MG Tabs per tablet      Take 1 tablet by mouth daily        * Notice:  This list has 2 medication(s) that are the same as other medications prescribed for you. Read the directions carefully, and ask your doctor or other care provider to review them with you.

## 2018-10-16 NOTE — PROGRESS NOTES
"Return OB Visit  10/16/18       Subjective: Chelsea Stein is a 38 year old  at 26w3d by LMP consistent with 7w5d US who presents for CHAVA visit. KEM from [facility].     Denies ctx/VB/LOF. Good FM. Denies SLE flares, HAs, vision changes. No other c/o today.     Objective:  BP 92/66  Pulse 94  Ht 1.6 m (5' 3\")  Wt 58 kg (127 lb 12.8 oz)  LMP 2018  BMI 22.64 kg/m2  See OB flowsheet    A/P  38 year old  at 26w3d by LMP c/w 7w5d US, for CHAVA visit. Pregnancy complicated by:    #) SLE, no renal involvement:    - Normal flare-ups are joint pain, Raynaud's, photosensivity   - no steroid course in >2yrs   - last seen by Rheum at 7 weeks at [facility]   - Recommendations per [facility]  Continue all necessary medications continue Plaquenil    Low dose aspirin for preeclampsia prevention pt already taking    ECG, with echocardiogram if abnormal    Thyroid function (TSH, reflex to free T4) 1.2    Antibodies to Ro and La (SSA, SSB) - pt is SSA positive    Antiphospholipid antibody syndrome testing if other signs and symptoms of APS:    Lupus anticoagulant - negative    Anticardiolipin antibodies - negative    Anti-beta2 glycoprotein 1 antibodies - negative    Serial ultrasound for fetal growth after 24 weeks (next scheduled with [facility] 10/18)    Due to positive SSA antibody and 2-5% risk of fetal heart block.     S/p normal serial fetal echos thru 28wks    q2w limited US for fetal ME interval measurements (last one scheduled 10/18 per [facility])    Frequent visits to assess blood pressure and urine protein    Weekly  testing with BPP (or NST and MVP) starting at 32 weeks    Stress dose steroids during labor if chronic steroids are necessary during the pregnancy.    Recommend she establish care with a rheumatologist here     #) Leukopenia/Neutropenia with Left shift (2% blasts)   - S/p Hematology and BM Bx in [place], BMBx wnl   - Thought to be due to SLE    #) Cervical polyp 3.5x2cm with " occasional postcoital spotting   - can reassess at time of delivery if no problems    #) AMA   - NIPT wnl    #) Routine pregnancy   - GCT administered today   - No concerns on prenatal labs as above, other than leukopenia, which has been addressed  - normal anatomy, posterior placenta at 20w3d    #) History of CS x1 for placenta previa  - Considering TOLAC. Briefly discussed last visit, will continue to re-address      Ashanti Heart MD  10/16/2018

## 2018-10-16 NOTE — LETTER
"10/16/2018       RE: Chelsea Stein  [address]  [address]  [place] [place] [facility] [address]     Dear Colleague,    Thank you for referring your patient, Chelsea Stein, to the [facility] at [facility]. Please see a copy of my visit note below.    Return OB Visit  10/16/18       Subjective: Chelsea Stein is a 38 year old  at 26w3d by LMP consistent with 7w5d US who presents for CHAVA visit. KEM from [facility].     Denies ctx/VB/LOF. Good FM. Denies SLE flares, HAs, vision changes. No other c/o today.     Objective:  BP 92/66  Pulse 94  Ht 1.6 m (5' 3\")  Wt 58 kg (127 lb 12.8 oz)  LMP 2018  BMI 22.64 kg/m2  See OB flowsheet    A/P  38 year old  at 26w3d by LMP c/w 7w5d US, for CHAVA visit. Pregnancy complicated by:    #) SLE, no renal involvement:    - Normal flare-ups are joint pain, Raynaud's, photosensivity   - no steroid course in >2yrs   - last seen by Rheum at 7 weeks at [facility]   - Recommendations per MFM  Continue all necessary medications continue Plaquenil    Low dose aspirin for preeclampsia prevention pt already taking    ECG, with echocardiogram if abnormal    Thyroid function (TSH, reflex to free T4) 1.2    Antibodies to Ro and La (SSA, SSB) - pt is SSA positive    Antiphospholipid antibody syndrome testing if other signs and symptoms of APS:    Lupus anticoagulant  - negative    Anticardiolipin antibodies - negative    Anti-beta2 glycoprotein 1 antibodies - negative    Serial ultrasound for fetal growth after 24 weeks (next scheduled with [facility] 10/18)    Due to positive SSA antibody and 2-5% risk of fetal heart block.     S/p normal serial fetal echos thru 28wks    q2w limited US for fetal [place] interval measurements (last one scheduled 10/18 per [facility])    Frequent visits to assess blood pressure and urine protein    Weekly  testing with BPP (or NST and MVP) starting at 32 weeks    Stress dose steroids " during labor if chronic steroids are necessary during the pregnancy.    Recommend she establish care with a rheumatologist here      #) Leukopenia/Neutropenia with Left shift (2% blasts)   - S/p Hematology and BM Bx in [place], BMBx wnl   - Thought to be due to SLE    #) Cervical polyp 3.5x2cm with occasional postcoital spotting   - can reassess at time of delivery if no problems    #) AMA   - NIPT wnl    #) Routine pregnancy   - GCT administered today   - No concerns on prenatal labs as above, other than leukopenia, which has been addressed  - normal anatomy, posterior placenta at 20w3d    #) History of CS x1 for placenta previa  - Considering TOLAC. Briefly discussed last visit, will continue to re-address      Ashanti Heart MD  10/16/2018

## 2018-10-17 PROBLEM — D72.819 LEUKOPENIA: Status: ACTIVE | Noted: 2018-10-17

## 2018-10-17 PROBLEM — N84.1 CERVICAL POLYP: Status: ACTIVE | Noted: 2018-10-17

## 2018-10-17 PROBLEM — O09.529 SUPERVISION OF HIGH-RISK PREGNANCY OF ELDERLY MULTIGRAVIDA: Status: ACTIVE | Noted: 2018-10-17

## 2018-10-18 ENCOUNTER — OFFICE VISIT (OUTPATIENT)
Dept: MATERNAL FETAL MEDICINE | Facility: CLINIC | Age: 38
End: 2018-10-18
Attending: OBSTETRICS & GYNECOLOGY
Payer: COMMERCIAL

## 2018-10-18 ENCOUNTER — HOSPITAL ENCOUNTER (OUTPATIENT)
Dept: ULTRASOUND IMAGING | Facility: CLINIC | Age: 38
Discharge: HOME | End: 2018-10-18
Attending: OBSTETRICS & GYNECOLOGY | Admitting: OBSTETRICS & GYNECOLOGY
Payer: COMMERCIAL

## 2018-10-18 DIAGNOSIS — O99.891 SYSTEMIC LUPUS ERYTHEMATOSUS AFFECTING PREGNANCY IN SECOND TRIMESTER (H): ICD-10-CM

## 2018-10-18 DIAGNOSIS — R76.8 SS-A ANTIBODY POSITIVE: Primary | ICD-10-CM

## 2018-10-18 DIAGNOSIS — M32.9 SYSTEMIC LUPUS ERYTHEMATOSUS AFFECTING PREGNANCY IN SECOND TRIMESTER (H): ICD-10-CM

## 2018-10-18 PROCEDURE — 76815 OB US LIMITED FETUS(S): CPT

## 2018-10-18 NOTE — MR AVS SNAPSHOT
After Visit Summary   10/18/2018    Chelsea Stein    MRN: [id number]           Patient Information     Date Of Birth          1980        Visit Information        Provider Department      10/18/2018 10:00 AM Myrtle Lyn MD [facility] Maternal Fetal Medicine  [place]        Today's Diagnoses     SS-A antibody positive    -  1       Follow-ups after your visit        Your next 10 appointments already scheduled     Oct 30, 2018  9:30 AM CDT   RETURN OB with Maci Bailey MD   [facility] ([facility])    [facility] [address]  [address]  [address]  [facility] [address]   [phone]            Mar 04, 2019  8:30 AM CST   (Arrive by 8:15 AM)   Return Visit with Latoya Cartagena MD   [facility] Rheumatology ([facility])    [address]  [address]  [facility] [id number] [phone]            Mar 04, 2019  9:30 AM CST   NEW RETINA with Clare Montano MD   Eye Clinic ([facility])    [address]  [address]  [facility] [id number] [phone]              Who to contact     If you have questions or need follow up information about today's clinic visit or your schedule please contact [facility] MATERNAL FETAL MEDICINE  [place] directly at [phone].  Normal or non-critical lab and imaging results will be communicated to you by MyChart, letter or phone within 4 business days after the clinic has received the results. If you do not hear from us within 7 days, please contact the clinic through MyChart or phone. If you have a critical or abnormal lab result, we will notify you by phone as soon as possible.  Submit refill requests through [employer] or call your pharmacy and they will forward the refill request to us. Please allow 3 business days for your refill to be completed.          Additional Information About Your Visit         FanMob Information     [employer] gives you secure access to your electronic health record. If you see a primary care provider, you can also send messages to your care team and make appointments. If you have questions, please call your primary care clinic.  If you do not have a primary care provider, please call [phone] and they will assist you.        Care EveryWhere ID     This is your Care EveryWhere ID. This could be used by other organizations to access your [facility] medical records  [id number]        Your Vitals Were     Last Period                   04/14/2018            Blood Pressure from Last 3 Encounters:   10/16/18 92/66   10/15/18 97/65   09/18/18 95/62    Weight from Last 3 Encounters:   10/16/18 58 kg (127 lb 12.8 oz)   10/15/18 57.9 kg (127 lb 11.2 oz)   09/18/18 56.7 kg (125 lb)              Today, you had the following     No orders found for display       Primary Care Provider    None Specified       No primary provider on file.        Equal Access to Services     [facility]I : Hadii aad ku hadasho Soomaali, waaxda luqadaha, qaybta kaalmada adeegyada, waxay amairaniin anibal larose . So [facility] [phone].    ATENCIÓN: Si habla español, tiene a parker disposición servicios gratuitos de asistencia lingüística. Llame al [phone].    We comply with applicable federal civil rights laws and [place] laws. We do not discriminate on the basis of race, color, national origin, age, disability, sex, sexual orientation, or gender identity.            Thank you!     Thank you for choosing [facility]  for your care. Our goal is always to provide you with excellent care. Hearing back from our patients is one way we can continue to improve our services. Please take a few minutes to complete the written survey that you may receive in the mail after your visit with us. Thank you!             Your Updated Medication List - Protect others around you: Learn how to  safely use, store and throw away your medicines at www.disposemymeds.org.          This list is accurate as of 10/18/18 10:24 AM.  Always use your most recent med list.                   Brand Name Dispense Instructions for use Diagnosis    ASPIRIN PO      Take 81 mg by mouth daily        * hydroxychloroquine 200 MG tablet    PLAQUENIL     TK ONE AND SS T PO QD        * hydroxychloroquine 200 MG tablet    PLAQUENIL    130 tablet    300 mg/day (1.5 pills) Annual Plaquenil toxicity eye screening required.    Systemic lupus complicating pregnancy (H)       IRON SUPPLEMENT PO      Take 65 mg by mouth daily        prenatal multivitamin plus iron 27-0.8 MG Tabs per tablet      Take 1 tablet by mouth daily        * Notice:  This list has 2 medication(s) that are the same as other medications prescribed for you. Read the directions carefully, and ask your doctor or other care provider to review them with you.

## 2018-10-18 NOTE — PROGRESS NOTES
Please see ultrasound report under imaging tab for details on ultrasound performed today.    Myrtle Lyn MD  , OB/GYN  Maternal-Fetal Medicine  [email or web address]  [phone] (Academic office)  [phone] (Pager)

## 2018-10-30 ENCOUNTER — RADIANT APPOINTMENT (OUTPATIENT)
Dept: ULTRASOUND IMAGING | Facility: CLINIC | Age: 38
End: 2018-10-30
Attending: OBSTETRICS & GYNECOLOGY
Payer: COMMERCIAL

## 2018-10-30 ENCOUNTER — OFFICE VISIT (OUTPATIENT)
Dept: OBGYN | Facility: CLINIC | Age: 38
End: 2018-10-30
Attending: OBSTETRICS & GYNECOLOGY
Payer: COMMERCIAL

## 2018-10-30 VITALS
BODY MASS INDEX: 22.82 KG/M2 | HEART RATE: 102 BPM | HEIGHT: 63 IN | SYSTOLIC BLOOD PRESSURE: 86 MMHG | DIASTOLIC BLOOD PRESSURE: 53 MMHG | WEIGHT: 128.8 LBS

## 2018-10-30 DIAGNOSIS — M32.9 SYSTEMIC LUPUS COMPLICATING PREGNANCY (H): ICD-10-CM

## 2018-10-30 DIAGNOSIS — O34.219 HISTORY OF CESAREAN DELIVERY, ANTEPARTUM: ICD-10-CM

## 2018-10-30 DIAGNOSIS — O99.891 SYSTEMIC LUPUS COMPLICATING PREGNANCY (H): ICD-10-CM

## 2018-10-30 DIAGNOSIS — O09.93 HIGH-RISK PREGNANCY IN THIRD TRIMESTER: Primary | ICD-10-CM

## 2018-10-30 DIAGNOSIS — O09.93 HIGH-RISK PREGNANCY IN THIRD TRIMESTER: ICD-10-CM

## 2018-10-30 LAB
ALBUMIN SERPL-MCNC: 2.8 G/DL (ref 3.4–5)
ALBUMIN UR-MCNC: NEGATIVE MG/DL
ALT SERPL W P-5'-P-CCNC: 27 U/L (ref 0–50)
APPEARANCE UR: CLEAR
AST SERPL W P-5'-P-CCNC: 24 U/L (ref 0–45)
BACTERIA #/AREA URNS HPF: ABNORMAL /HPF
BASOPHILS # BLD AUTO: 0 10E9/L (ref 0–0.2)
BASOPHILS NFR BLD AUTO: 0 %
BILIRUB UR QL STRIP: NEGATIVE
C3 SERPL-MCNC: 95 MG/DL (ref 76–169)
C4 SERPL-MCNC: 14 MG/DL (ref 15–50)
COLOR UR AUTO: ABNORMAL
CREAT SERPL-MCNC: 0.45 MG/DL (ref 0.52–1.04)
CREAT UR-MCNC: 19 MG/DL
CRP SERPL-MCNC: <2.9 MG/L (ref 0–8)
DIFFERENTIAL METHOD BLD: ABNORMAL
EOSINOPHIL # BLD AUTO: 0 10E9/L (ref 0–0.7)
EOSINOPHIL NFR BLD AUTO: 0 %
ERYTHROCYTE [DISTWIDTH] IN BLOOD BY AUTOMATED COUNT: 14 % (ref 10–15)
ERYTHROCYTE [SEDIMENTATION RATE] IN BLOOD BY WESTERGREN METHOD: 27 MM/H (ref 0–20)
GFR SERPL CREATININE-BSD FRML MDRD: >90 ML/MIN/1.7M2
GLUCOSE UR STRIP-MCNC: NEGATIVE MG/DL
HCT VFR BLD AUTO: 31.3 % (ref 35–47)
HGB BLD-MCNC: 10.6 G/DL (ref 11.7–15.7)
HGB UR QL STRIP: NEGATIVE
IMM GRANULOCYTES # BLD: 0 10E9/L (ref 0–0.4)
IMM GRANULOCYTES NFR BLD: 0.6 %
KETONES UR STRIP-MCNC: NEGATIVE MG/DL
LEUKOCYTE ESTERASE UR QL STRIP: ABNORMAL
LYMPHOCYTES # BLD AUTO: 0.5 10E9/L (ref 0.8–5.3)
LYMPHOCYTES NFR BLD AUTO: 31.6 %
MCH RBC QN AUTO: 32.8 PG (ref 26.5–33)
MCHC RBC AUTO-ENTMCNC: 33.9 G/DL (ref 31.5–36.5)
MCV RBC AUTO: 97 FL (ref 78–100)
MONOCYTES # BLD AUTO: 0.2 10E9/L (ref 0–1.3)
MONOCYTES NFR BLD AUTO: 13.9 %
NEUTROPHILS # BLD AUTO: 0.9 10E9/L (ref 1.6–8.3)
NEUTROPHILS NFR BLD AUTO: 53.9 %
NITRATE UR QL: NEGATIVE
NRBC # BLD AUTO: 0 10*3/UL
NRBC BLD AUTO-RTO: 0 /100
PH UR STRIP: 7 PH (ref 5–7)
PLATELET # BLD AUTO: 112 10E9/L (ref 150–450)
PROT UR-MCNC: <0.05 G/L
PROT/CREAT 24H UR: NORMAL G/G CR (ref 0–0.2)
RBC # BLD AUTO: 3.23 10E12/L (ref 3.8–5.2)
RBC #/AREA URNS AUTO: <1 /HPF (ref 0–2)
SOURCE: ABNORMAL
SP GR UR STRIP: 1 (ref 1–1.03)
SQUAMOUS #/AREA URNS AUTO: 4 /HPF (ref 0–1)
URATE SERPL-MCNC: 4.1 MG/DL (ref 2.6–6)
UROBILINOGEN UR STRIP-MCNC: NORMAL MG/DL (ref 0–2)
WBC # BLD AUTO: 1.6 10E9/L (ref 4–11)
WBC #/AREA URNS AUTO: 5 /HPF (ref 0–5)

## 2018-10-30 PROCEDURE — G0463 HOSPITAL OUTPT CLINIC VISIT: HCPCS | Mod: ZF,25,59

## 2018-10-30 PROCEDURE — 85025 COMPLETE CBC W/AUTO DIFF WBC: CPT | Performed by: STUDENT IN AN ORGANIZED HEALTH CARE EDUCATION/TRAINING PROGRAM

## 2018-10-30 PROCEDURE — 84156 ASSAY OF PROTEIN URINE: CPT | Performed by: STUDENT IN AN ORGANIZED HEALTH CARE EDUCATION/TRAINING PROGRAM

## 2018-10-30 PROCEDURE — 36415 COLL VENOUS BLD VENIPUNCTURE: CPT | Performed by: STUDENT IN AN ORGANIZED HEALTH CARE EDUCATION/TRAINING PROGRAM

## 2018-10-30 PROCEDURE — 86162 COMPLEMENT TOTAL (CH50): CPT | Performed by: STUDENT IN AN ORGANIZED HEALTH CARE EDUCATION/TRAINING PROGRAM

## 2018-10-30 PROCEDURE — 84550 ASSAY OF BLOOD/URIC ACID: CPT | Performed by: STUDENT IN AN ORGANIZED HEALTH CARE EDUCATION/TRAINING PROGRAM

## 2018-10-30 PROCEDURE — 86140 C-REACTIVE PROTEIN: CPT | Performed by: STUDENT IN AN ORGANIZED HEALTH CARE EDUCATION/TRAINING PROGRAM

## 2018-10-30 PROCEDURE — 85652 RBC SED RATE AUTOMATED: CPT | Performed by: STUDENT IN AN ORGANIZED HEALTH CARE EDUCATION/TRAINING PROGRAM

## 2018-10-30 PROCEDURE — 82040 ASSAY OF SERUM ALBUMIN: CPT | Performed by: STUDENT IN AN ORGANIZED HEALTH CARE EDUCATION/TRAINING PROGRAM

## 2018-10-30 PROCEDURE — 76816 OB US FOLLOW-UP PER FETUS: CPT

## 2018-10-30 PROCEDURE — 86160 COMPLEMENT ANTIGEN: CPT | Performed by: STUDENT IN AN ORGANIZED HEALTH CARE EDUCATION/TRAINING PROGRAM

## 2018-10-30 PROCEDURE — 25000128 H RX IP 250 OP 636: Mod: ZF

## 2018-10-30 PROCEDURE — 90471 IMMUNIZATION ADMIN: CPT | Mod: ZF

## 2018-10-30 PROCEDURE — 90715 TDAP VACCINE 7 YRS/> IM: CPT | Mod: ZF

## 2018-10-30 PROCEDURE — 84450 TRANSFERASE (AST) (SGOT): CPT | Performed by: STUDENT IN AN ORGANIZED HEALTH CARE EDUCATION/TRAINING PROGRAM

## 2018-10-30 PROCEDURE — 82565 ASSAY OF CREATININE: CPT | Performed by: STUDENT IN AN ORGANIZED HEALTH CARE EDUCATION/TRAINING PROGRAM

## 2018-10-30 PROCEDURE — 86225 DNA ANTIBODY NATIVE: CPT | Performed by: STUDENT IN AN ORGANIZED HEALTH CARE EDUCATION/TRAINING PROGRAM

## 2018-10-30 PROCEDURE — 87086 URINE CULTURE/COLONY COUNT: CPT | Performed by: OBSTETRICS & GYNECOLOGY

## 2018-10-30 PROCEDURE — 81001 URINALYSIS AUTO W/SCOPE: CPT | Performed by: STUDENT IN AN ORGANIZED HEALTH CARE EDUCATION/TRAINING PROGRAM

## 2018-10-30 PROCEDURE — 84460 ALANINE AMINO (ALT) (SGPT): CPT | Performed by: STUDENT IN AN ORGANIZED HEALTH CARE EDUCATION/TRAINING PROGRAM

## 2018-10-30 NOTE — PROGRESS NOTES
"S: Doing well. Good movement. No ctx, lof, vb, ha. Feeling more tired.   O: BP (!) 86/53 (BP Location: Right arm, Patient Position: Chair)  Pulse 102  Ht 1.6 m (5' 3\")  Wt 58.4 kg (128 lb 12.8 oz)  LMP 2018  Breastfeeding? No  BMI 22.82 kg/m2  A/P:     38 year old  at 28w3d by LMP c/w 7w5d US, for CHAVA visit. Pregnancy complicated by:     #) SLE, no renal involvement:    - Normal flare-ups are joint pain, Raynaud's, photosensivity   - no steroid course in >2yrs   - last seen by Rheum at 7 weeks at [facility]- has Rheum appt here in 3/19   - Recommendations per MFM  Continue all necessary medications continue Plaquenil    Low dose aspirin for preeclampsia prevention pt already taking    ECG, with echocardiogram if abnormal    Thyroid function (TSH, reflex to free T4) 1.2    Antibodies to Ro and La (SSA, SSB) - pt is SSA positive    Antiphospholipid antibody syndrome testing if other signs and symptoms of APS:    Lupus anticoagulant - negative    Anticardiolipin antibodies - negative    Anti-beta2 glycoprotein 1 antibodies - negative    Serial ultrasound for fetal growth after 24 weeks- due today    Due to positive SSA antibody and 2-5% risk of fetal heart block.     S/p normal serial fetal echos thru 28wks    q2w limited US for fetal [place] interval measurements (last one scheduled 10/18 per MFM)    Frequent visits to assess blood pressure and urine protein    Weekly  testing with BPP (or NST and MVP) starting at 32 weeks    Stress dose steroids during labor if chronic steroids are necessary during the pregnancy.     #) Leukopenia/Neutropenia with Left shift (2% blasts)   - S/p Hematology and BM Bx in [place], BMBx wnl   - Thought to be due to SLE     #) Cervical polyp 3.5x2cm with occasional postcoital spotting   - can reassess at time of delivery if no problems     #) AMA   - NIPT wnl     #) Routine pregnancy   - GCT normal  - No concerns on prenatal labs as above, other than " leukopenia, which has been addressed  - normal anatomy, posterior placenta at 20w3d  - Tdap today, s/p flu shot     #) History of CS x1 for placenta previa  - Planning TOLAC Consent form given today and discussed. Will review and discuss next visit.     RTC 2 weeks    Maci Bailey MD

## 2018-10-30 NOTE — LETTER
"10/30/2018       RE: Chelsea Stein  [address]  [address]  [place] [address]     Dear Colleague,    Thank you for referring your patient, Chelsea Stein, to the [facility] at [facility]. Please see a copy of my visit note below.    S: Doing well. Good movement. No ctx, lof, vb, ha. Feeling more tired.   O: BP (!) 86/53 (BP Location: Right arm, Patient Position: Chair)  Pulse 102  Ht 1.6 m (5' 3\")  Wt 58.4 kg (128 lb 12.8 oz)  LMP 2018  Breastfeeding? No  BMI 22.82 kg/m2  A/P:     38 year old  at 28w3d by LMP c/w 7w5d US, for CHAVA visit. Pregnancy complicated by:     #) SLE, no renal involvement:    - Normal flare-ups are joint pain, Raynaud's, photosensivity   - no steroid course in >2yrs   - last seen by Rheum at 7 weeks at [facility]- has Rheum appt here in 3/19   - Recommendations per MFM  Continue all necessary medications continue Plaquenil    Low dose aspirin for preeclampsia prevention pt already taking    ECG, with echocardiogram if abnormal    Thyroid function (TSH, reflex to free T4) 1.2    Antibodies to Ro and La (SSA, SSB) - pt is SSA positive    Antiphospholipid antibody syndrome testing if other signs and symptoms of APS:    Lupus anticoagulant - negative    Anticardiolipin antibodies - negative    Anti-beta2 glycoprotein 1 antibodies - negative    Serial ultrasound for fetal growth after 24 weeks- due today    Due to positive SSA antibody and 2-5% risk of fetal heart block.     S/p normal serial fetal echos thru 28wks    q2w limited US for fetal [place] interval measurements (last one scheduled 10/18 per MFM)    Frequent visits to assess blood pressure and urine protein    Weekly  testing with BPP (or NST and MVP) starting at 32 weeks    Stress dose steroids during labor if chronic steroids are necessary during the pregnancy.     #) Leukopenia/Neutropenia with Left shift (2% blasts)   - S/p Hematology " and BM Bx in [place], BMBx wnl   - Thought to be due to SLE     #) Cervical polyp 3.5x2cm with occasional postcoital spotting   - can reassess at time of delivery if no problems     #) AMA   - NIPT wnl     #) Routine pregnancy   - GCT normal  - No concerns on prenatal labs as above, other than leukopenia, which has been addressed  - normal anatomy, posterior placenta at 20w3d  - Tdap today, s/p flu shot     #) History of CS x1 for placenta previa  - Planning TOLAC Consent form given today and discussed. Will review and discuss next visit.     RTC 2 weeks    Maci Bailey MD

## 2018-10-30 NOTE — MR AVS SNAPSHOT
After Visit Summary   10/30/2018    Chelsea Stein    MRN: [id number]           Patient Information     Date Of Birth          1980        Visit Information        Provider Department      10/30/2018 9:30 AM Maci Bailey MD [facility]        Today's Diagnoses     High-risk pregnancy in third trimester    -  1    History of  delivery, antepartum           Follow-ups after your visit        Follow-up notes from your care team     Return in about 2 weeks (around 2018) for CHAVA.      Your next 10 appointments already scheduled     Mar 04, 2019  8:30 AM CST   (Arrive by 8:15 AM)   Return Visit with Latoya Cartagena MD   [facility] ([facility])    [address]  [address]  [place] [address] [phone]            Mar 04, 2019  9:30 AM CST   NEW RETINA with Clare Montano MD   [facility] Clinic ([facility])    [address]  [place] [facility] [address] [phone]              Who to contact     Please call your clinic at [phone] to:    Ask questions about your health    Make or cancel appointments    Discuss your medicines    Learn about your test results    Speak to your doctor            Additional Information About Your Visit        [employer] Information     [employer] gives you secure access to your electronic health record. If you see a primary care provider, you can also send messages to your care team and make appointments. If you have questions, please call your primary care clinic.  If you do not have a primary care provider, please call [phone] and they will assist you.      [employer] is an electronic gateway that provides easy, online access to your medical records. With [employer], you can request a clinic appointment, read your test results, renew a prescription or communicate with your care team.     To access your existing  "account, please contact your [facility] or call [phone] for assistance.        Care EveryWhere ID     This is your Care EveryWhere ID. This could be used by other organizations to access your [place] medical records  [id number]        Your Vitals Were     Pulse Height Last Period Breastfeeding? BMI (Body Mass Index)       102 1.6 m (5' 3\") 04/14/2018 No 22.82 kg/m2        Blood Pressure from Last 3 Encounters:   10/30/18 (!) 86/53   10/16/18 92/66   10/15/18 97/65    Weight from Last 3 Encounters:   10/30/18 58.4 kg (128 lb 12.8 oz)   10/16/18 58 kg (127 lb 12.8 oz)   10/15/18 57.9 kg (127 lb 11.2 oz)              We Performed the Following     TDAP VACCINE (BOOSTRIX)        Primary Care Provider    None Specified       No primary provider on file.        Equal Access to Services     [facility] : Hadii gildardo felixo Lisette, waaxda lumargarita, qaybta kaalmada pita, nikki larose . So [facility] [phone].    ATENCIÓN: Si habla español, tiene a parker disposición servicios gratuitos de asistencia lingüística. Edenilson al [phone].    We comply with applicable federal civil rights laws and [place] laws. We do not discriminate on the basis of race, color, national origin, age, disability, sex, sexual orientation, or gender identity.            Thank you!     Thank you for choosing [facility]  for your care. Our goal is always to provide you with excellent care. Hearing back from our patients is one way we can continue to improve our services. Please take a few minutes to complete the written survey that you may receive in the mail after your visit with us. Thank you!             Your Updated Medication List - Protect others around you: Learn how to safely use, store and throw away your medicines at www.disposemymeds.org.          This list is accurate as of 10/30/18 10:02 AM.  Always use your most recent med list.                "    Brand Name Dispense Instructions for use Diagnosis    ASPIRIN PO      Take 81 mg by mouth daily        * hydroxychloroquine 200 MG tablet    PLAQUENIL     TK ONE AND SS T PO QD        * hydroxychloroquine 200 MG tablet    PLAQUENIL    130 tablet    300 mg/day (1.5 pills) Annual Plaquenil toxicity eye screening required.    Systemic lupus complicating pregnancy (H)       IRON SUPPLEMENT PO      Take 65 mg by mouth daily        prenatal multivitamin plus iron 27-0.8 MG Tabs per tablet      Take 1 tablet by mouth daily        * Notice:  This list has 2 medication(s) that are the same as other medications prescribed for you. Read the directions carefully, and ask your doctor or other care provider to review them with you.

## 2018-10-30 NOTE — NURSING NOTE
Chief Complaint   Patient presents with     Prenatal Care     28w3d       See JOVANNY Higuera 10/30/2018

## 2018-10-31 LAB
BACTERIA SPEC CULT: NORMAL
DSDNA AB SER-ACNC: 2 IU/ML
Lab: NORMAL
SPECIMEN SOURCE: NORMAL

## 2018-11-01 DIAGNOSIS — M32.19 OTHER SYSTEMIC LUPUS ERYTHEMATOSUS WITH OTHER ORGAN INVOLVEMENT (H): Primary | ICD-10-CM

## 2018-11-01 DIAGNOSIS — M32.9 SYSTEMIC LUPUS ERYTHEMATOSUS, UNSPECIFIED SLE TYPE, UNSPECIFIED ORGAN INVOLVEMENT STATUS (H): Primary | ICD-10-CM

## 2018-11-01 LAB — CH50 SERPL-ACNC: 111 CAE UNITS (ref 60–144)

## 2018-11-13 ENCOUNTER — OFFICE VISIT (OUTPATIENT)
Dept: OBGYN | Facility: CLINIC | Age: 38
End: 2018-11-13
Attending: STUDENT IN AN ORGANIZED HEALTH CARE EDUCATION/TRAINING PROGRAM
Payer: COMMERCIAL

## 2018-11-13 VITALS
HEIGHT: 63 IN | SYSTOLIC BLOOD PRESSURE: 101 MMHG | BODY MASS INDEX: 23.25 KG/M2 | HEART RATE: 94 BPM | WEIGHT: 131.2 LBS | DIASTOLIC BLOOD PRESSURE: 66 MMHG

## 2018-11-13 DIAGNOSIS — M32.8 OTHER FORMS OF SYSTEMIC LUPUS ERYTHEMATOSUS, UNSPECIFIED ORGAN INVOLVEMENT STATUS (H): ICD-10-CM

## 2018-11-13 DIAGNOSIS — O09.90 HIGH-RISK PREGNANCY, UNSPECIFIED TRIMESTER: Primary | ICD-10-CM

## 2018-11-13 LAB
CREAT UR-MCNC: 37 MG/DL
PROT UR-MCNC: 0.1 G/L
PROT/CREAT 24H UR: 0.26 G/G CR (ref 0–0.2)

## 2018-11-13 PROCEDURE — G0463 HOSPITAL OUTPT CLINIC VISIT: HCPCS | Mod: ZF

## 2018-11-13 PROCEDURE — 84156 ASSAY OF PROTEIN URINE: CPT | Performed by: STUDENT IN AN ORGANIZED HEALTH CARE EDUCATION/TRAINING PROGRAM

## 2018-11-13 NOTE — PROGRESS NOTES
"Return OB Visit    S: No complaints, feeling well. Good fetal movement. No ctx, lof, vb, ha.     O: /66 (BP Location: Right arm, Patient Position: Chair)  Pulse 94  Ht 1.6 m (5' 3\")  Wt 59.5 kg (131 lb 3.2 oz)  LMP 2018  BMI 23.24 kg/m2     Abd: soft, gravid, nontender  Doptones: 140s    A/P:   38 year old  at 30w3d by LMP c/w 7w5d US, for CHAVA visit. Pregnancy complicated by:     #) SLE, no renal involvement: S/p MFM consult with recommendations as below   - Normal flare-ups are joint pain, Raynaud's, photosensivity  - Antibodies to Ro and La (SSA, SSB) - pt is SSA positive. Lupus anticoagulant, Anticardiolipin antibodies, & anti-beta2 glycoprotein 1 antibodies - negative   - no steroid course in >2yrs   - last seen by Rheum 10/15/18  - TSH wnl, 1.2 (18)  - Continue Plaquenil  - Low dose aspirin for preeclampsia prevention  - S/p normal serial fetal echos thru 28wks due to positive SSA antibody and 2-5% risk of fetal heart block  - Serial ultrasound for fetal growth after 24 weeks- last done 10/30/18 - EFW 1612g (97%ile), next ordered, can be done ~ at time of BPP  - Weekly  testing with BPP (or NST and MVP) starting at 32 weeks, first is scheduled for   - C3, C4, dsDNA, CRP, ESR, CBC w/diff, LFTs q3mo per Rheum (next in ); UP:C and BP checks via q2wk visits - U P:C trend: 0.23 () > unable to calculate (10/30). Repeat U P:C today  - Stress dose steroids during labor if chronic steroids are necessary during the pregnancy.     #) Leukopenia/Neutropenia with Left shift (2% blasts)   - S/p Hematology and BM Bx in [place], BMBx wnl   - Thought to be due to SLE    #) History of CS x1 for placenta previa  - TOLAC discussed, she is interested in TOLAC and consent form signed. Discussed increased chance of success with vaginal delivery if spontaneous labor, discussed cannot use misoprostol for ripening if induction is needed. Discussed risk of uterine rupture 1/100 and " increased risk if A lot of these factors depend on how baby grows, if she develops HTN this pregnancy, and if she goes into labor on her own.      #) Cervical polyp 3.5x2cm with occasional postcoital spotting   - can reassess at time of delivery if no problems     #) AMA   - NIPT wnl     #) Routine pregnancy   - No concerns on prenatal labs, other than leukopenia related to SLE  - normal anatomy, posterior placenta at 20w3d  - GCT normal  - S/p Tdap & flu shot (10/15 & 10/30)  - contraception will be addressed at next visit     RTC 2 weeks    Nori Grimm MD    I agree with note as above. The patient was seen in continuity clinic by the resident doctor.  Assessment and plan were jointly made.  Sammi Kam MD

## 2018-11-13 NOTE — LETTER
"2018       RE: Chelsea Stein  [address]  [address]  [place] [address]     Dear Colleague,    Thank you for referring your patient, Chelsea Stein, to the [facility] at [facility]. Please see a copy of my visit note below.    Return OB Visit    S: No complaints, feeling well. Good fetal movement. No ctx, lof, vb, ha.     O: /66 (BP Location: Right arm, Patient Position: Chair)  Pulse 94  Ht 1.6 m (5' 3\")  Wt 59.5 kg (131 lb 3.2 oz)  LMP 2018  BMI 23.24 kg/m2     Abd: soft, gravid, nontender  Doptones: 140s    A/P:   38 year old  at 30w3d by LMP c/w 7w5d US, for CHAVA visit. Pregnancy complicated by:     #) SLE, no renal involvement: S/p MFM consult with recommendations as below   - Normal flare-ups are joint pain, Raynaud's, photosensivity  - Antibodies to Ro and La (SSA, SSB) - pt is SSA positive. Lupus anticoagulant, Anticardiolipin antibodies, & anti-beta2 glycoprotein 1 antibodies - negative   - no steroid course in >2yrs   - last seen by Rheum 10/15/18  - TSH wnl, 1.2 (18)  - Continue Plaquenil  - Low dose aspirin for preeclampsia prevention  - S/p normal serial fetal echos thru 28wks due to positive SSA antibody and 2-5% risk of fetal heart block  - Serial ultrasound for fetal growth after 24 weeks- last done 10/30/18 - EFW 1612g (97%ile), next ordered, can be done ~ at time of BPP  - Weekly  testing with BPP (or NST and MVP) starting at 32 weeks, first is scheduled for   - C3, C4, dsDNA, CRP, ESR, CBC w/diff, LFTs q3mo per Rheum (next in ); UP:C and BP checks via q2wk visits - U P:C trend: 0.23 () > unable to calculate (10/30). Repeat U P:C today  - Stress dose steroids during labor if chronic steroids are necessary during the pregnancy.     #) Leukopenia/Neutropenia with Left shift (2% blasts)   - S/p Hematology and BM Bx in [place], BMBx wnl   - Thought to be due to SLE    #) " History of CS x1 for placenta previa  - TOLAC discussed, she is interested in TOLAC and consent form signed. Discussed increased chance of success with vaginal delivery if spontaneous labor, discussed cannot use misoprostol for ripening if induction is needed. Discussed risk of uterine rupture 1/100 and increased risk if A lot of these factors depend on how baby grows, if she develops HTN this pregnancy, and if she goes into labor on her own.      #) Cervical polyp 3.5x2cm with occasional postcoital spotting   - can reassess at time of delivery if no problems     #) AMA   - NIPT wnl     #) Routine pregnancy   - No concerns on prenatal labs, other than leukopenia related to SLE  - normal anatomy, posterior placenta at 20w3d  - GCT normal  - S/p Tdap & flu shot (10/15 & 10/30)  - contraception will be addressed at next visit     RTC 2 weeks      Nori Grimm MD

## 2018-11-13 NOTE — MR AVS SNAPSHOT
After Visit Summary   11/13/2018    Chelsea Stein    MRN: [id number]           Patient Information     Date Of Birth          1980        Visit Information        Provider Department      11/13/2018 3:00 PM Nori Grimm MD [facility]        Today's Diagnoses     Other forms of systemic lupus erythematosus, unspecified organ involvement status (H)    -  1    High-risk pregnancy, unspecified trimester           Follow-ups after your visit        Your next 10 appointments already scheduled     Nov 29, 2018  2:15 PM CST   RETURN OB with Amy Schumer, MD   [facility] ([facility])    [facility] [address]  [address]  [place] [address]   [phone]            Dec 11, 2018  9:30 AM CST   RETURN OB with Maci Bailey MD   [facility] ([facility])    [facility]  [address]  [address]  [facility] [address]   [phone]            Dec 18, 2018  9:30 AM CST   RETURN OB with Ashanti Heart MD   [facility] ([facility])    [facility]  [address]  [address]  [facility] [address]   [phone]            Dec 27, 2018  1:15 PM CST   RETURN OB with Amy Schumer, MD   [facility] ([facility])    [facility] [address]  [address]  [facility] [address]   [phone]            Jan 03, 2019  9:30 AM CST   RETURN OB with Myrtle Friedman MD   [facility] ([facility])    [facility] [address]  [address]  [facility] [address]   [phone]            Jan 09, 2019  9:30 AM CST   RETURN OB with Joanie Montoya MD   [facility] ([facility])    [facility]  [facility] [address]   [phone]            Jan 16, 2019  9:30 AM CST   RETURN OB with Vicki Mcgee MD   [facility] ([facility])    [facility] [address]  [address]  [address]  [facility] [address]   [phone]            Mar 04, 2019  8:30 AM CST   (Arrive by 8:15 AM)   Return Visit with Latoya Cartagena MD   [facility] ([facility])    [address]  [address]  [place] [address] [phone]            Mar 04, 2019  9:30 AM CST   NEW RETINA with Clare Montano MD   Eye Clinic ([facility])    [address]  [address]  [facility] [address] [phone]              Who to contact     Please call your clinic at [phone] to:    Ask questions about your health    Make or cancel appointments    Discuss your medicines    Learn about your test results    Speak to your doctor            Additional Information About Your Visit        [employer] Information     [employer] gives you secure access to your electronic health record. If you see a primary care provider, you can also send messages to your care team and make appointments. If you have questions, please call your primary care clinic.  If you do not have a primary care provider, please call [phone] and they will assist you.      [employer] is an electronic gateway that provides easy, online access to your medical records. With [employer], you can request a clinic appointment, read your test results, renew a prescription or communicate with your care team.     To access your existing account, please contact your [facility] or call [phone] for assistance.        Care EveryWhere ID     This is your Care EveryWhere ID. This could be used by other organizations to access your [place] medical records  [id number]        Your Vitals Were     Pulse  "Height Last Period BMI (Body Mass Index)          94 1.6 m (5' 3\") 04/14/2018 23.24 kg/m2         Blood Pressure from Last 3 Encounters:   No data found for BP    Weight from Last 3 Encounters:   No data found for Wt              Today, you had the following     No orders found for display       Primary Care Provider    None Specified       No address on file        Equal Access to Services     [facility] : Hadii aad ku hadalaynao Soomaali, waaxda luqadaha, qaybta kaalmada adekrishnada, nikki wren cmn perlaevelyn schwab latanian . So [facility] [phone].    ATENCIÓN: Si habla español, tiene a parker disposición servicios gratuitos de asistencia lingüística. Llame al [phone].    We comply with applicable federal civil rights laws and [place] laws. We do not discriminate on the basis of race, color, national origin, age, disability, sex, sexual orientation, or gender identity.            Thank you!     Thank you for choosing [facility]  for your care. Our goal is always to provide you with excellent care. Hearing back from our patients is one way we can continue to improve our services. Please take a few minutes to complete the written survey that you may receive in the mail after your visit with us. Thank you!             Your Updated Medication List - Protect others around you: Learn how to safely use, store and throw away your medicines at www.disposemymeds.org.          This list is accurate as of 11/13/18 11:59 PM.  Always use your most recent med list.                   Brand Name Dispense Instructions for use Diagnosis    ASPIRIN PO      Take 81 mg by mouth daily        hydroxychloroquine 200 MG tablet    PLAQUENIL    130 tablet    300 mg/day (1.5 pills) Annual Plaquenil toxicity eye screening required.    Systemic lupus complicating pregnancy (H)       IRON SUPPLEMENT PO      Take 65 mg by mouth daily        prenatal multivitamin plus iron 27-0.8 MG Tabs per tablet      Take 1 tablet by mouth " daily

## 2018-11-20 ENCOUNTER — TELEPHONE (OUTPATIENT)
Dept: OBGYN | Facility: CLINIC | Age: 38
End: 2018-11-20

## 2018-11-20 ENCOUNTER — RADIANT APPOINTMENT (OUTPATIENT)
Dept: ULTRASOUND IMAGING | Facility: CLINIC | Age: 38
End: 2018-11-20
Payer: COMMERCIAL

## 2018-11-20 ENCOUNTER — HOSPITAL ENCOUNTER (OUTPATIENT)
Facility: CLINIC | Age: 38
Discharge: HOME | End: 2018-11-20
Attending: OBSTETRICS & GYNECOLOGY | Admitting: OBSTETRICS & GYNECOLOGY
Payer: COMMERCIAL

## 2018-11-20 VITALS
TEMPERATURE: 97.6 F | SYSTOLIC BLOOD PRESSURE: 91 MMHG | RESPIRATION RATE: 18 BRPM | DIASTOLIC BLOOD PRESSURE: 59 MMHG | HEIGHT: 63 IN | BODY MASS INDEX: 23.21 KG/M2 | WEIGHT: 131 LBS | HEART RATE: 96 BPM

## 2018-11-20 DIAGNOSIS — O09.93 HIGH-RISK PREGNANCY IN THIRD TRIMESTER: ICD-10-CM

## 2018-11-20 PROBLEM — Z36.89 ENCOUNTER FOR TRIAGE IN PREGNANT PATIENT: Status: ACTIVE | Noted: 2018-11-20

## 2018-11-20 PROCEDURE — 76819 FETAL BIOPHYS PROFIL W/O NST: CPT

## 2018-11-20 PROCEDURE — G0463 HOSPITAL OUTPT CLINIC VISIT: HCPCS | Mod: 25

## 2018-11-20 PROCEDURE — 59025 FETAL NON-STRESS TEST: CPT

## 2018-11-20 NOTE — IP AVS SNAPSHOT
[facility]    [address]    [place] [address]    Phone:  [phone]                                       After Visit Summary   11/20/2018    Chelsea Stein    MRN: [id number]           After Visit Summary Signature Page     I have received my discharge instructions, and my questions have been answered. I have discussed any challenges I see with this plan with the nurse or doctor.    ..........................................................................................................................................  Patient/Patient Representative Signature      ..........................................................................................................................................  Patient Representative Print Name and Relationship to Patient    ..................................................               ................................................  Date                                   Time    ..........................................................................................................................................  Reviewed by Signature/Title    ...................................................              ..............................................  Date                                               Time          22EPIC Rev 08/18

## 2018-11-20 NOTE — IP AVS SNAPSHOT
MRN:[id number]                      After Visit Summary   11/20/2018    Chelsea Stein    MRN: [id number]           Thank you!     Thank you for choosing [facility] for your care. Our goal is always to provide you with excellent care. Hearing back from our patients is one way we can continue to improve our services. Please take a few minutes to complete the written survey that you may receive in the mail after you visit with us. Thank you!        Patient Information     Date Of Birth          1980        Designated Caregiver       Most Recent Value    Caregiver    Will someone help with your care after discharge? no      About your hospital stay     You were admitted on:  November 20, 2018 You last received care in the:  [facility]    You were discharged on:  November 20, 2018       Who to Call     For medical emergencies, please call 911.  For non-urgent questions about your medical care, please call your primary care provider or clinic, None          Attending Provider     Provider Specialty    Sammi Kam MD OB/Gyn       Primary Care Provider    None Specified      Your next 10 appointments already scheduled     Nov 29, 2018  2:15 PM CST   RETURN OB with Amy Schumer, MD   [facility] ([facility])    [place] [facility] [address]  [address]  [address]  [facility] [address]   [phone]            Dec 11, 2018  9:30 AM CST   RETURN OB with Maci Bailey MD   [facility] ([facility])    [place] [facility] [address]  [address]  [address]  [facility] [address]   [phone]            Dec 18, 2018  9:30 AM CST   RETURN OB with Ashanti Heart MD   [facility] ([facility])    [place] [facility] [address]  [address]  [address]  [facility] [address]   [phone]            Dec 27, 2018  1:15 PM CST   RETURN OB with Amy Schumer, MD    [facility] ([facility])    [place] [facility] [address]  [address]  [address]  [facility] [address]   [phone]            Jan 03, 2019  9:30 AM CST   RETURN OB with Myrtle Friedman MD   [facility] ([facility])    [place] [facility] [address]  [address]  [address]  [facility] [address]   [phone]            Jan 09, 2019  9:30 AM CST   RETURN OB with Joanie Montoya MD   [facility] ([facility])    [place] [facility] [address]  [address]  [address]  [facility] [address] [phone]            Jan 16, 2019  9:30 AM CST   RETURN OB with Vicki Mcgee MD   [facility] ([facility])    [place] [facility] [address]  [address]  [address]  [facility] [address] [phone]            Mar 04, 2019  8:30 AM CST   (Arrive by 8:15 AM)   Return Visit with Latoya Cartagena MD   [facility] ([facility])    [address] [facility]  [address]  [facility] [id number] [phone]            Mar 04, 2019  9:30 AM CST   NEW RETINA with Clare Montano MD   [facility] ([facility])    [address]  [address]  [facility] [address] [phone]              Further instructions from your care team       Discharge Instruction for Undelivered Patients      You were seen for: Fetal Assessment  We Consulted: [facility] MD  You had (Test or Medicine):NST (Non Stress Test)        Activity:  Count fetal kicks everyday (see handout)  Call your doctor or nurse midwife if your baby is moving less than usual.     Call your provider if you notice:  Swelling in your face or increased swelling in your hands or legs.  Headaches that are not relieved by Tylenol (acetaminophen).  Changes in your vision (blurring:  "seeing spots or stars.)  Nausea (sick to your stomach) and vomiting (throwing up).   Weight gain of 5 pounds or more per week.  Heartburn that doesn't go away.  Signs of bladder infection: pain when you urinate (use the toilet), need to go more often and more urgently.  The bag of griffith (rupture of membranes) breaks, or you notice leaking in your underwear.  Bright red blood in your underwear.  Abdominal (lower belly) or stomach pain.  Second (plus) baby: Contractions (tightening) less than 10 minutes apart and getting stronger.  If less than 34 weeks: Contractions (tightenings) more than 6 times in one hour.  Increase or change in vaginal discharge (note the color and amount)      Follow-up:  As scheduled in the clinic          Pending Results     Date and Time Order Name Status Description    11/20/2018 0950  OB FETAL BIOPHY PROFILE W/O NON STRESS SINGLE In process             Statement of Approval     Ordered          11/20/18 1126  I have reviewed and agree with all the recommendations and orders detailed in this document.  EFFECTIVE NOW     Approved and electronically signed by:  Zarina Galvan MD             Admission Information     Date & Time Provider Department Dept. Phone    11/20/2018 Sammi Kam MD [facility] [phone]      Your Vitals Were     Blood Pressure Pulse Temperature Respirations Height Weight    91/59 96 97.6  F (36.4  C) (Oral) 18 1.6 m (5' 3\") 59.4 kg (131 lb)    Last Period BMI (Body Mass Index)                04/14/2018 23.21 kg/m2          MyChart Information     [facility] gives you secure access to your electronic health record. If you see a primary care provider, you can also send messages to your care team and make appointments. If you have questions, please call your primary care clinic.  If you do not have a primary care provider, please call [phone] and they will assist you.        Care EveryWhere ID     This is your Care EveryWhere ID. This could be used by " other organizations to access your [place] medical records  [id number]        Equal Access to Services     HECTOR GAONA : Noe Knox, crsitel schumacher, yaima carrasquillomavanessa lema, nikki davenelliaddie gong. So [facility] [phone].    ATENCIÓN: Si habla español, tiene a parker disposición servicios gratuitos de asistencia lingüística. Llame al [phone].    We comply with applicable federal civil rights laws and [place] laws. We do not discriminate on the basis of race, color, national origin, age, disability, sex, sexual orientation, or gender identity.               Review of your medicines      UNREVIEWED medicines. Ask your doctor about these medicines        Dose / Directions    ASPIRIN PO        Dose:  81 mg   Take 81 mg by mouth daily   Refills:  0       hydroxychloroquine 200 MG tablet   Commonly known as:  PLAQUENIL   Used for:  Systemic lupus complicating pregnancy (H)        300 mg/day (1.5 pills) Annual Plaquenil toxicity eye screening required.   Quantity:  130 tablet   Refills:  3       IRON SUPPLEMENT PO        Dose:  65 mg   Take 65 mg by mouth daily   Refills:  0       prenatal multivitamin plus iron 27-0.8 MG Tabs per tablet        Dose:  1 tablet   Take 1 tablet by mouth daily   Refills:  0                Protect others around you: Learn how to safely use, store and throw away your medicines at www.disposemymeds.org.             Medication List: This is a list of all your medications and when to take them. Check marks below indicate your daily home schedule. Keep this list as a reference.      Medications           Morning Afternoon Evening Bedtime As Needed    ASPIRIN PO   Take 81 mg by mouth daily                                hydroxychloroquine 200 MG tablet   Commonly known as:  PLAQUENIL   300 mg/day (1.5 pills) Annual Plaquenil toxicity eye screening required.                                IRON SUPPLEMENT PO   Take 65 mg by mouth daily                                 prenatal multivitamin plus iron 27-0.8 MG Tabs per tablet   Take 1 tablet by mouth daily                                          More Information        Kick Counts    It s normal to worry about your baby s health. One way you can know your baby s doing well is to record the baby s movements once a day. This is called a kick count. Remember to take your kick count records to all your appointments with your healthcare provider.  How to count kicks  Here are tips for counting kicks:    Choose a time when the baby is active, such as after a meal.     Sit comfortably or lie on your side.     The first time the baby moves, write down the time.     Count each movement until the baby has moved 10 times. This can take from 20 minutes to 2 hours.     Try to do it at the same time each day.  When to call your healthcare provider  Call your healthcare provider right away if you notice any of the following:    Your baby moves fewer than 10 times in 2 hours while you re doing kick counts.    Your baby moves much less often than on the days before.    You have not felt your baby move all day.  Date Last Reviewed: 12/1/2017 2000-2018 [employer]. [address] [place] [address]. All rights reserved. This information is not intended as a substitute for professional medical care. Always follow your healthcare professional's instructions.

## 2018-11-20 NOTE — PROGRESS NOTES
"[facility]  OB Triage Note    CC: BPP 6/8     HPI: Ms. Chelsea Stein is a 38 year old  at 31w3d by LMP c/w US at 7w5d, who presents from clinic due to BPP of 6/8. She denies contractions, leaking fluid, vaginal bleeding.  + Good fetal movement.    Obstetric Complications  1. SLE, SSA Ab +  2. Leukopenia/neutropenia with left shift  3. Hx CS x1, desires TOLAC  4. Cervical polyp  5. AMA    Objective:  Patient Vitals for the past 24 hrs:   BP Temp Temp src Pulse Resp Height Weight   18 1101 91/59 97.6  F (36.4  C) Oral 96 18 1.6 m (5' 3\") 59.4 kg (131 lb)     Gen: Well-appearing, NAD  CV: Regular rate  Pulm: nonlabored breathing   Spec: deferred  Cervix: deferred    FHT: , mod yael, + accels, no decels  Linda: no ctx in 10 mins      Assesment/Plan:  Ms. Chelsea Stein is a 38 year old  at 31w3d by LMP c/w 7w5d US here from clinic for NST due to BPP 6/8, -2 for breathing. Reactive NST here, reassuring. No other obstetric complaints.     -Fetal Well Being   - Category I FHT. Reactive and reassuring    Next appointment on  with Dr. Schumer. Discussed calling clinic to schedule weekly BPPs after 32 weeks per MFM recommendations.     Seen with Dr. Eddy Galvan MD  OB/Gyn PGY-2  2018    OB/GYN Staff -- Pt seen and examined by me. Agree with note as above. Tracing reviewed by me.  Agree reactive NST.  MD Chely    "

## 2018-11-20 NOTE — DISCHARGE SUMMARY
Data: [facility]   Reason for maternal/fetal assessment per patient is Non Stress Test (BPP  in S clinic)  . Patient is a . Prenatal record reviewed.      Obstetric History       T0      L1     SAB0   TAB0   Ectopic0   Multiple0   Live Births1       # Outcome Date GA Lbr Curt/2nd Weight Sex Delivery Anes PTL Lv   2 Current            1   36w6d    CS-LTranv   JAYNE         Medical History:   Past Medical History:   Diagnosis Date     Lupus (systemic lupus erythematosus) (H)     SSA+   . Gestational Age 31w3d. VSS. Cervix: not examined.  Fetal movement present. Patient denies cramping, backache, vaginal discharge, pelvic pressure, UTI symptoms, GI problems, bloody show, vaginal bleeding, edema, headache, visual disturbances, epigastric or URQ pain, abdominal pain, rupture of membranes. Support persons not present.  Action: Verbal consent for EFM. Triage assessment completed. EFM applied for NST. Uterine assessment reveals no contractions. Fetal assessment: Presumed adequate fetal oxygenation documented (see flow record). Patient education pamphlets given on fetal movement counts . Patient instructed to report change in fetal movement, vaginal leaking of fluid or bleeding, abdominal pain, or any concerns related to the pregnancy to her nurse/physician.   Response: Dr. Galvan informed of patient arrival. Plan per provider is discharge patient. Patient verbalized understanding of education and verbalized agreement with plan. Discharged ambulatory at 1135.

## 2018-11-20 NOTE — DISCHARGE INSTRUCTIONS
Discharge Instruction for Undelivered Patients      You were seen for: Fetal Assessment  We Consulted: [facility] MD  You had (Test or Medicine):NST (Non Stress Test)        Activity:  Count fetal kicks everyday (see handout)  Call your doctor or nurse midwife if your baby is moving less than usual.     Call your provider if you notice:  Swelling in your face or increased swelling in your hands or legs.  Headaches that are not relieved by Tylenol (acetaminophen).  Changes in your vision (blurring: seeing spots or stars.)  Nausea (sick to your stomach) and vomiting (throwing up).   Weight gain of 5 pounds or more per week.  Heartburn that doesn't go away.  Signs of bladder infection: pain when you urinate (use the toilet), need to go more often and more urgently.  The bag of griffith (rupture of membranes) breaks, or you notice leaking in your underwear.  Bright red blood in your underwear.  Abdominal (lower belly) or stomach pain.  Second (plus) baby: Contractions (tightening) less than 10 minutes apart and getting stronger.  If less than 34 weeks: Contractions (tightenings) more than 6 times in one hour.  Increase or change in vaginal discharge (note the color and amount)      Follow-up:  As scheduled in the clinic

## 2018-11-20 NOTE — PLAN OF CARE
Patient arrived from [facility] clinic for BPP 6/8. 2 off for breathing. Patient placed on monitor, Dr Galvan notified.

## 2018-11-20 NOTE — TELEPHONE ENCOUNTER
Patient in clinic today for BPP. Scored 6/8 for breathing. [facility] to [facility] for NST. Informed L&D charge nurse.

## 2018-11-27 NOTE — PROGRESS NOTES
Central Prior Authorization Team   Phone: [phone]    This is a duplicate request.  Please see previous telephone encounter.  PA has already been initiated.    See MyChart note

## 2018-11-29 ENCOUNTER — OFFICE VISIT (OUTPATIENT)
Dept: OBGYN | Facility: CLINIC | Age: 38
End: 2018-11-29
Payer: COMMERCIAL

## 2018-11-29 ENCOUNTER — RADIANT APPOINTMENT (OUTPATIENT)
Dept: ULTRASOUND IMAGING | Facility: CLINIC | Age: 38
End: 2018-11-29
Payer: COMMERCIAL

## 2018-11-29 VITALS
BODY MASS INDEX: 23.21 KG/M2 | DIASTOLIC BLOOD PRESSURE: 62 MMHG | HEART RATE: 92 BPM | SYSTOLIC BLOOD PRESSURE: 91 MMHG | WEIGHT: 131 LBS

## 2018-11-29 DIAGNOSIS — O09.90 HIGH-RISK PREGNANCY, UNSPECIFIED TRIMESTER: ICD-10-CM

## 2018-11-29 DIAGNOSIS — O09.90 HIGH-RISK PREGNANCY, UNSPECIFIED TRIMESTER: Primary | ICD-10-CM

## 2018-11-29 LAB
CREAT UR-MCNC: 37 MG/DL
PROT UR-MCNC: 0.08 G/L
PROT/CREAT 24H UR: 0.2 G/G CR (ref 0–0.2)

## 2018-11-29 PROCEDURE — 84156 ASSAY OF PROTEIN URINE: CPT | Performed by: STUDENT IN AN ORGANIZED HEALTH CARE EDUCATION/TRAINING PROGRAM

## 2018-11-29 PROCEDURE — 76816 OB US FOLLOW-UP PER FETUS: CPT

## 2018-11-29 PROCEDURE — G0463 HOSPITAL OUTPT CLINIC VISIT: HCPCS | Mod: ZF,25

## 2018-11-29 ASSESSMENT — PAIN SCALES - GENERAL: PAINLEVEL: NO PAIN (0)

## 2018-11-29 NOTE — MR AVS SNAPSHOT
After Visit Summary   11/29/2018    Chelsea Stein    MRN: [id number]           Patient Information     Date Of Birth          1980        Visit Information        Provider Department      11/29/2018 2:15 PM Schumer, Amy, MD [facility]        Today's Diagnoses     High-risk pregnancy, unspecified trimester    -  1       Follow-ups after your visit        Follow-up notes from your care team     Return in about 2 weeks (around 12/13/2018) for Routine Visit.      Your next 10 appointments already scheduled     Dec 05, 2018  9:00 AM CST   (Arrive by 8:45 AM)   US FETAL BIOPHYS PROFILE W/O NON STRESS TEST with URWHSUS1   [facility] ([facility])    [facility]  3rd Floor, [address]  [address] [place] [address] [phone]           How do I prepare for my exam? (Food and drink instructions) Drink four 8-ounce glasses of fluid. Finish drinking an hour before your exam, so you have a full bladder. If you need to empty your bladder before your exam, try to release only a little urine. Then, drink another glass of fluid.  How do I prepare for my exam? (Other instructions) You may have up to two family members in the exam room. If you bring a small child, an adult must be there to care for him or her. No video or camera photography during the procedure.  What should I wear: Wear comfortable clothes.  How long does the exam take: Most ultrasounds take 30 to 60 minutes.  What should I bring: Bring a list of your medicines, including vitamins, minerals and over-the-counter drugs. It is safest to leave personal items at home.  Do I need a :  No  is needed.  What do I need to tell my doctor: Tell your doctor about any allergies you may have.  What should I do after the exam: No restrictions, you may resume normal activities.  What is this test: An ultrasound uses sound waves to make pictures of the  body. Sound waves do not cause pain. The only discomfort may be the pressure of the wand against your skin or full bladder.  Who should I call with questions: If you have any questions, please call the Imaging Department where you will have your exam. Directions, parking instructions, and other information are available on our website, [email or web address].            Dec 11, 2018  9:30 AM CST   RETURN OB with Maci Bailey MD   [facility] ([facility])    [facility]  [address]  [address] [facility] [id number] [phone]            Dec 13, 2018 10:00 AM CST   (Arrive by 9:45 AM)   US FETAL BIOPHYS PROFILE W/O NON STRESS TEST with URWHSUS1   [facility] ([facility])    [place] [facility]  3rd Floor, [address]  [address] [id number] [phone]           How do I prepare for my exam? (Food and drink instructions) Drink four 8-ounce glasses of fluid. Finish drinking an hour before your exam, so you have a full bladder. If you need to empty your bladder before your exam, try to release only a little urine. Then, drink another glass of fluid.  How do I prepare for my exam? (Other instructions) You may have up to two family members in the exam room. If you bring a small child, an adult must be there to care for him or her. No video or camera photography during the procedure.  What should I wear: Wear comfortable clothes.  How long does the exam take: Most ultrasounds take 30 to 60 minutes.  What should I bring: Bring a list of your medicines, including vitamins, minerals and over-the-counter drugs. It is safest to leave personal items at home.  Do I need a :  No  is needed.  What do I need to tell my doctor: Tell your doctor about any allergies you may have.  What should I do after the exam: No restrictions, you may resume normal activities.  What is this  test: An ultrasound uses sound waves to make pictures of the body. Sound waves do not cause pain. The only discomfort may be the pressure of the wand against your skin or full bladder.  Who should I call with questions: If you have any questions, please call the Imaging Department where you will have your exam. Directions, parking instructions, and other information are available on our website, [email or web address].            Dec 18, 2018  9:30 AM CST   (Arrive by 9:15 AM)   US FETAL BIOPHYS PROFILE W/O NON STRESS TEST with URWHSUS1   [facility] ([facility])    [facility]  3rd Floor, [address]  [address] [place] [address] [phone]           How do I prepare for my exam? (Food and drink instructions) Drink four 8-ounce glasses of fluid. Finish drinking an hour before your exam, so you have a full bladder. If you need to empty your bladder before your exam, try to release only a little urine. Then, drink another glass of fluid.  How do I prepare for my exam? (Other instructions) You may have up to two family members in the exam room. If you bring a small child, an adult must be there to care for him or her. No video or camera photography during the procedure.  What should I wear: Wear comfortable clothes.  How long does the exam take: Most ultrasounds take 30 to 60 minutes.  What should I bring: Bring a list of your medicines, including vitamins, minerals and over-the-counter drugs. It is safest to leave personal items at home.  Do I need a :  No  is needed.  What do I need to tell my doctor: Tell your doctor about any allergies you may have.  What should I do after the exam: No restrictions, you may resume normal activities.  What is this test: An ultrasound uses sound waves to make pictures of the body. Sound waves do not cause pain. The only discomfort may be the pressure of the wand against your skin or full bladder.  Who  should I call with questions: If you have any questions, please call the Imaging Department where you will have your exam. Directions, parking instructions, and other information are available on our website, [email or web address].            Dec 18, 2018 10:15 AM CST   RETURN OB with Maci Bailey MD   [facility] ([facility])    [facility]  [address]  [address] [facility] [id number] [phone]            Dec 27, 2018  1:00 PM CST   (Arrive by 12:45 PM)   US FETAL BIOPHYS PROFILE W/O NON STRESS TEST with URWHSUS1   [facility] ([facility])    [place] [facility]  3rd Floor, [address]  [address] [place] [id number] [phone]           How do I prepare for my exam? (Food and drink instructions) Drink four 8-ounce glasses of fluid. Finish drinking an hour before your exam, so you have a full bladder. If you need to empty your bladder before your exam, try to release only a little urine. Then, drink another glass of fluid.  How do I prepare for my exam? (Other instructions) You may have up to two family members in the exam room. If you bring a small child, an adult must be there to care for him or her. No video or camera photography during the procedure.  What should I wear: Wear comfortable clothes.  How long does the exam take: Most ultrasounds take 30 to 60 minutes.  What should I bring: Bring a list of your medicines, including vitamins, minerals and over-the-counter drugs. It is safest to leave personal items at home.  Do I need a :  No  is needed.  What do I need to tell my doctor: Tell your doctor about any allergies you may have.  What should I do after the exam: No restrictions, you may resume normal activities.  What is this test: An ultrasound uses sound waves to make pictures of the body. Sound waves do not cause pain. The only discomfort may be the  pressure of the wand against your skin or full bladder.  Who should I call with questions: If you have any questions, please call the Imaging Department where you will have your exam. Directions, parking instructions, and other information are available on our website, [email or web address].            Dec 27, 2018  1:30 PM CST   RETURN OB with Amy Schumer, MD   [facility] ([facility])    [facility] [facility]  [address]  [address] [facility] [address]   [phone]            Jan 03, 2019  9:00 AM CST   (Arrive by 8:45 AM)   US FETAL BIOPHYS PROFILE W/O NON STRESS TEST with URWHSUS1   [facility] ([facility])    [place] [facility]  3rd Floor, [address]  [address] [address] [phone]           How do I prepare for my exam? (Food and drink instructions) Drink four 8-ounce glasses of fluid. Finish drinking an hour before your exam, so you have a full bladder. If you need to empty your bladder before your exam, try to release only a little urine. Then, drink another glass of fluid.  How do I prepare for my exam? (Other instructions) You may have up to two family members in the exam room. If you bring a small child, an adult must be there to care for him or her. No video or camera photography during the procedure.  What should I wear: Wear comfortable clothes.  How long does the exam take: Most ultrasounds take 30 to 60 minutes.  What should I bring: Bring a list of your medicines, including vitamins, minerals and over-the-counter drugs. It is safest to leave personal items at home.  Do I need a :  No  is needed.  What do I need to tell my doctor: Tell your doctor about any allergies you may have.  What should I do after the exam: No restrictions, you may resume normal activities.  What is this test: An ultrasound uses sound waves to make pictures of the body. Sound waves do  not cause pain. The only discomfort may be the pressure of the wand against your skin or full bladder.  Who should I call with questions: If you have any questions, please call the Imaging Department where you will have your exam. Directions, parking instructions, and other information are available on our website, [email or web address].            Jan 03, 2019  9:30 AM CST   RETURN OB with Myrtle Friedman MD   [facility] ([facility])    [place] [facility]  [address]  [address] [facility] [address] [phone]            Jan 09, 2019  9:30 AM CST   RETURN OB with Joanie Montoya MD   [facility] ([facility])    [place] [facility]  [address]  [address] [facility] [address] [phone]              Who to contact     Please call your clinic at [phone] to:    Ask questions about your health    Make or cancel appointments    Discuss your medicines    Learn about your test results    Speak to your doctor            Additional Information About Your Visit        [employer] Information     [employer] gives you secure access to your electronic health record. If you see a primary care provider, you can also send messages to your care team and make appointments. If you have questions, please call your primary care clinic.  If you do not have a primary care provider, please call [phone] and they will assist you.      [employer] is an electronic gateway that provides easy, online access to your medical records. With [employer], you can request a clinic appointment, read your test results, renew a prescription or communicate with your care team.     To access your existing account, please contact your [facility] or call [phone] for assistance.        Care EveryWhere ID     This is your Care EveryWhere ID. This could be used by other organizations to access your  [place] medical records  [id number]        Your Vitals Were     Pulse Last Period Breastfeeding? BMI (Body Mass Index)          92 04/14/2018 No 23.21 kg/m2         Blood Pressure from Last 3 Encounters:   11/29/18 91/62   11/20/18 91/59   11/13/18 101/66    Weight from Last 3 Encounters:   11/29/18 59.4 kg (131 lb)   11/20/18 59.4 kg (131 lb)   11/13/18 59.5 kg (131 lb 3.2 oz)              We Performed the Following     Creatinine urine calculation only     Protein  random urine with Creat Ratio        Primary Care Provider Fax #    Physician No Ref-Primary [phone]       No address on file        Equal Access to Services     HECTOR GAONA : Noe Knox, cristel schumacher, yaima lema, nikki gong. So [facility] [phone].    ATENCIÓN: Si habla español, tiene a parker disposición servicios gratuitos de asistencia lingüística. Llame al [phone].    We comply with applicable federal civil rights laws and [place] laws. We do not discriminate on the basis of race, color, national origin, age, disability, sex, sexual orientation, or gender identity.            Thank you!     Thank you for choosing [facility]  for your care. Our goal is always to provide you with excellent care. Hearing back from our patients is one way we can continue to improve our services. Please take a few minutes to complete the written survey that you may receive in the mail after your visit with us. Thank you!             Your Updated Medication List - Protect others around you: Learn how to safely use, store and throw away your medicines at www.disposemymeds.org.          This list is accurate as of 11/29/18 11:59 PM.  Always use your most recent med list.                   Brand Name Dispense Instructions for use Diagnosis    ASPIRIN PO      Take 81 mg by mouth daily        hydroxychloroquine 200 MG tablet    PLAQUENIL    130 tablet    300 mg/day (1.5 pills)  Annual Plaquenil toxicity eye screening required.    Systemic lupus complicating pregnancy (H)       IRON SUPPLEMENT PO      Take 65 mg by mouth daily        prenatal multivitamin w/iron 27-0.8 MG tablet      Take 1 tablet by mouth daily

## 2018-11-29 NOTE — PROGRESS NOTES
Return OB Visit    S: Pt feels well today. Not sleeping well mostly due to heartburn.  Reports some increased shortness of breath. Occasional palpitations. Denies headaches, vision changes, or chest pain. Reports normal fetal movement. Denies vaginal bleeding, leaking of fluid or contractions.     O: LMP 2018     Abd: soft, gravid, nontender  Doptones: 128 bpm  FHT: 32 cm    BPP and growth US today    A/P:   38 year old  at 32w5d by LMP c/w 7w5d US, for CHAVA visit. Pregnancy complicated by:     #) SLE, no renal involvement: S/p MFM consult with recommendations as below   - Normal flare-ups are joint pain, Raynaud's, photosensivity  - Antibodies to Ro and La (SSA, SSB) - pt is SSA positive. Lupus anticoagulant, Anticardiolipin antibodies, & anti-beta2 glycoprotein 1 antibodies - negative   - no steroid course in >2yrs   - last seen by Rheum 10/15/18  - TSH wnl, 1.2 (18)  - Continue Plaquenil  - Low dose aspirin for preeclampsia prevention  - S/p normal serial fetal echos thru 28wks due to positive SSA antibody and 2-5% risk of fetal heart block  - Serial ultrasound for fetal growth after 24 weeks- last done 10/30/18 - EFW 1612g (97%ile), growth US today and BPP  - Weekly  testing with BPP (or NST and MVP) starting at 32 weeks, weekly BPPs starting today, orders in  - C3, C4, dsDNA, CRP, ESR, CBC w/diff, LFTs q3mo per Rheum (next in ); UP:C and BP checks via q2wk visits - U P:C trend: 0.23 () > unable to calculate (10/30)>0.26 (). Repeat UPC today  - Stress dose steroids during labor if chronic steroids are necessary during the pregnancy.     #) Leukopenia/Neutropenia with Left shift (2% blasts)   - S/p Hematology and BM Bx in [place], BMBx wnl   - Thought to be due to SLE    #) History of CS x1 for placenta previa  - TOLAC discussed, she is interested in TOLAC and consent form signed on  with Dr. Alfa     #) Cervical polyp 3.5x2cm with occasional postcoital spotting   -  can reassess at time of delivery if no problems     #) AMA   - NIPT wnl     #) Routine pregnancy   - No concerns on prenatal labs, other than leukopenia related to SLE  - normal anatomy, posterior placenta at 20w3d  - GCT normal  - S/p Tdap & flu shot (10/15 & 10/30)  - plans for condoms vs Mirena IUD for postpartum contraception     RTC 2 weeks for CHAVA, 1 week for BPP    Discussed with Dr. Mcgee.    Amy Schumer, MD  Ob/Gyn PGY-2  11/29/18 3:11 PM    The Patient was seen in [facility] by SCHUMER, AMY.  I reviewed the history & exam. Assessment and plan were jointly made.    Vicki Mcgee MD

## 2018-12-05 ENCOUNTER — RADIANT APPOINTMENT (OUTPATIENT)
Dept: ULTRASOUND IMAGING | Facility: CLINIC | Age: 38
End: 2018-12-05
Payer: COMMERCIAL

## 2018-12-05 DIAGNOSIS — O09.90 HIGH-RISK PREGNANCY, UNSPECIFIED TRIMESTER: ICD-10-CM

## 2018-12-05 PROCEDURE — 76819 FETAL BIOPHYS PROFIL W/O NST: CPT

## 2018-12-11 ENCOUNTER — OFFICE VISIT (OUTPATIENT)
Dept: OBGYN | Facility: CLINIC | Age: 38
End: 2018-12-11
Attending: OBSTETRICS & GYNECOLOGY
Payer: COMMERCIAL

## 2018-12-11 VITALS
WEIGHT: 131.2 LBS | HEART RATE: 99 BPM | SYSTOLIC BLOOD PRESSURE: 93 MMHG | DIASTOLIC BLOOD PRESSURE: 60 MMHG | HEIGHT: 63 IN | BODY MASS INDEX: 23.25 KG/M2

## 2018-12-11 DIAGNOSIS — M32.19 OTHER SYSTEMIC LUPUS ERYTHEMATOSUS WITH OTHER ORGAN INVOLVEMENT (H): ICD-10-CM

## 2018-12-11 DIAGNOSIS — O99.891 SYSTEMIC LUPUS COMPLICATING PREGNANCY (H): ICD-10-CM

## 2018-12-11 DIAGNOSIS — O09.93 HRP (HIGH RISK PREGNANCY), THIRD TRIMESTER: Primary | ICD-10-CM

## 2018-12-11 DIAGNOSIS — M32.9 SYSTEMIC LUPUS COMPLICATING PREGNANCY (H): ICD-10-CM

## 2018-12-11 DIAGNOSIS — M32.9 SYSTEMIC LUPUS ERYTHEMATOSUS, UNSPECIFIED SLE TYPE, UNSPECIFIED ORGAN INVOLVEMENT STATUS (H): ICD-10-CM

## 2018-12-11 LAB
BASOPHILS # BLD AUTO: 0 10E9/L (ref 0–0.2)
BASOPHILS NFR BLD AUTO: 0 %
DIFFERENTIAL METHOD BLD: ABNORMAL
EOSINOPHIL # BLD AUTO: 0 10E9/L (ref 0–0.7)
EOSINOPHIL NFR BLD AUTO: 0 %
ERYTHROCYTE [DISTWIDTH] IN BLOOD BY AUTOMATED COUNT: 14.2 % (ref 10–15)
HCT VFR BLD AUTO: 31.2 % (ref 35–47)
HGB BLD-MCNC: 10.6 G/DL (ref 11.7–15.7)
IMM GRANULOCYTES # BLD: 0 10E9/L (ref 0–0.4)
IMM GRANULOCYTES NFR BLD: 0 %
LYMPHOCYTES # BLD AUTO: 0.5 10E9/L (ref 0.8–5.3)
LYMPHOCYTES NFR BLD AUTO: 28 %
MCH RBC QN AUTO: 33.4 PG (ref 26.5–33)
MCHC RBC AUTO-ENTMCNC: 34 G/DL (ref 31.5–36.5)
MCV RBC AUTO: 98 FL (ref 78–100)
MONOCYTES # BLD AUTO: 0.3 10E9/L (ref 0–1.3)
MONOCYTES NFR BLD AUTO: 15.9 %
NEUTROPHILS # BLD AUTO: 0.9 10E9/L (ref 1.6–8.3)
NEUTROPHILS NFR BLD AUTO: 56.1 %
NRBC # BLD AUTO: 0 10*3/UL
NRBC BLD AUTO-RTO: 0 /100
PLATELET # BLD AUTO: 112 10E9/L (ref 150–450)
RBC # BLD AUTO: 3.17 10E12/L (ref 3.8–5.2)
WBC # BLD AUTO: 1.6 10E9/L (ref 4–11)

## 2018-12-11 PROCEDURE — 86162 COMPLEMENT TOTAL (CH50): CPT | Performed by: STUDENT IN AN ORGANIZED HEALTH CARE EDUCATION/TRAINING PROGRAM

## 2018-12-11 PROCEDURE — 86160 COMPLEMENT ANTIGEN: CPT | Performed by: STUDENT IN AN ORGANIZED HEALTH CARE EDUCATION/TRAINING PROGRAM

## 2018-12-11 PROCEDURE — G0463 HOSPITAL OUTPT CLINIC VISIT: HCPCS | Mod: ZF

## 2018-12-11 PROCEDURE — 85025 COMPLETE CBC W/AUTO DIFF WBC: CPT | Performed by: STUDENT IN AN ORGANIZED HEALTH CARE EDUCATION/TRAINING PROGRAM

## 2018-12-11 PROCEDURE — 86225 DNA ANTIBODY NATIVE: CPT | Performed by: STUDENT IN AN ORGANIZED HEALTH CARE EDUCATION/TRAINING PROGRAM

## 2018-12-11 PROCEDURE — 36415 COLL VENOUS BLD VENIPUNCTURE: CPT | Performed by: STUDENT IN AN ORGANIZED HEALTH CARE EDUCATION/TRAINING PROGRAM

## 2018-12-11 ASSESSMENT — MIFFLIN-ST. JEOR: SCORE: 1249.25

## 2018-12-11 NOTE — PROGRESS NOTES
"OB Visit    S: Doing well. Good movement, some back pain. No ctx, lof, vb, ha.   O: BP 93/60 (BP Location: Right arm, Patient Position: Chair)   Pulse 99   Ht 1.6 m (5' 3\")   Wt 59.5 kg (131 lb 3.2 oz)   LMP 2018   Breastfeeding? No   BMI 23.24 kg/m    A/P: 38 year old  @34w3d HRP  SLE- no renal involvement:  On plaquenil, no steroids. Stable, has follow up labs today.             Baby ASA for preelcampsia prevention   S/p nml weekly ECHO with +SSA   BPP Thursday, growth next week  H/o CS- CS for previa, desires TOLAC. Will re-assess growth next visit. Still deciding about IOL vs scheduled repeat if no  active labor. Will continue to re-address through 3rd trimester  AMA- Nml NIPT  S/p TDap, Flu  RTC 2 weeks- GBS next    Maci Bailey MD           "

## 2018-12-11 NOTE — NURSING NOTE
Chief Complaint   Patient presents with     Prenatal Care     34w3d       See JOVANNY Higuera 12/11/2018

## 2018-12-11 NOTE — LETTER
"2018       RE: Chelsea Stein  [address]  [address]  [place] [address]     Dear Colleague,    Thank you for referring your patient, Chelsea Stein, to the [facility] at [facility]. Please see a copy of my visit note below.    OB Visit    S: Doing well. Good movement, some back pain. No ctx, lof, vb, ha.   O: BP 93/60 (BP Location: Right arm, Patient Position: Chair)   Pulse 99   Ht 1.6 m (5' 3\")   Wt 59.5 kg (131 lb 3.2 oz)   LMP 2018   Breastfeeding? No   BMI 23.24 kg/m     A/P: 38 year old  @34w3d HRP  SLE- no renal involvement:  On plaquenil, no steroids. Stable, has follow up labs today.             Baby ASA for preelcampsia prevention   S/p nml weekly ECHO with +SSA   BPP Thursday, growth next week  H/o CS- CS for previa, desires TOLAC. Will re-assess growth next visit. Still deciding about IOL vs scheduled repeat if no  active labor. Will continue to re-address through 3rd trimester  AMA- Nml NIPT  S/p TDap, Flu  RTC 2 weeks- GBS next    Maci Bailey MD  "

## 2018-12-12 LAB
C3 SERPL-MCNC: 95 MG/DL (ref 76–169)
C4 SERPL-MCNC: 16 MG/DL (ref 15–50)
CH50 SERPL-ACNC: 82 CAE UNITS (ref 60–144)
DSDNA AB SER-ACNC: 2 IU/ML

## 2018-12-13 ENCOUNTER — TELEPHONE (OUTPATIENT)
Dept: OBGYN | Facility: CLINIC | Age: 38
End: 2018-12-13

## 2018-12-13 ENCOUNTER — ANCILLARY PROCEDURE (OUTPATIENT)
Dept: ULTRASOUND IMAGING | Facility: CLINIC | Age: 38
End: 2018-12-13
Payer: COMMERCIAL

## 2018-12-13 DIAGNOSIS — O09.90 HIGH-RISK PREGNANCY, UNSPECIFIED TRIMESTER: ICD-10-CM

## 2018-12-13 PROCEDURE — 76819 FETAL BIOPHYS PROFIL W/O NST: CPT

## 2018-12-14 ENCOUNTER — TELEPHONE (OUTPATIENT)
Dept: OBGYN | Facility: CLINIC | Age: 38
End: 2018-12-14

## 2018-12-14 ENCOUNTER — HOSPITAL ENCOUNTER (INPATIENT)
Facility: CLINIC | Age: 38
LOS: 1 days | Discharge: HOME | DRG: 832 | End: 2018-12-15
Attending: OBSTETRICS & GYNECOLOGY | Admitting: OBSTETRICS & GYNECOLOGY
Payer: COMMERCIAL

## 2018-12-14 PROBLEM — R10.9 ABDOMINAL PAIN: Status: ACTIVE | Noted: 2018-12-14

## 2018-12-14 PROBLEM — O60.00 PRETERM LABOR: Status: ACTIVE | Noted: 2018-12-14

## 2018-12-14 LAB
ABO + RH BLD: NORMAL
ABO + RH BLD: NORMAL
ALBUMIN UR-MCNC: NEGATIVE MG/DL
APPEARANCE UR: CLEAR
BACTERIA #/AREA URNS HPF: ABNORMAL /HPF
BASOPHILS # BLD AUTO: 0 10E9/L (ref 0–0.2)
BASOPHILS NFR BLD AUTO: 0 %
BILIRUB UR QL STRIP: NEGATIVE
BLD GP AB SCN SERPL QL: NORMAL
BLOOD BANK CMNT PATIENT-IMP: NORMAL
COLOR UR AUTO: ABNORMAL
DIFFERENTIAL METHOD BLD: ABNORMAL
EOSINOPHIL # BLD AUTO: 0 10E9/L (ref 0–0.7)
EOSINOPHIL NFR BLD AUTO: 0 %
ERYTHROCYTE [DISTWIDTH] IN BLOOD BY AUTOMATED COUNT: 14.4 % (ref 10–15)
GLUCOSE UR STRIP-MCNC: NEGATIVE MG/DL
HCT VFR BLD AUTO: 29.9 % (ref 35–47)
HGB BLD-MCNC: 10.1 G/DL (ref 11.7–15.7)
HGB UR QL STRIP: ABNORMAL
IMM GRANULOCYTES # BLD: 0 10E9/L (ref 0–0.4)
IMM GRANULOCYTES NFR BLD: 0 %
KETONES UR STRIP-MCNC: NEGATIVE MG/DL
LEUKOCYTE ESTERASE UR QL STRIP: ABNORMAL
LYMPHOCYTES # BLD AUTO: 0.3 10E9/L (ref 0.8–5.3)
LYMPHOCYTES NFR BLD AUTO: 16.6 %
MCH RBC QN AUTO: 33.3 PG (ref 26.5–33)
MCHC RBC AUTO-ENTMCNC: 33.8 G/DL (ref 31.5–36.5)
MCV RBC AUTO: 99 FL (ref 78–100)
MONOCYTES # BLD AUTO: 0.1 10E9/L (ref 0–1.3)
MONOCYTES NFR BLD AUTO: 4.9 %
NEUTROPHILS # BLD AUTO: 1.3 10E9/L (ref 1.6–8.3)
NEUTROPHILS NFR BLD AUTO: 78.5 %
NITRATE UR QL: NEGATIVE
NRBC # BLD AUTO: 0 10*3/UL
NRBC BLD AUTO-RTO: 0 /100
PH UR STRIP: 6.5 PH (ref 5–7)
PLATELET # BLD AUTO: 104 10E9/L (ref 150–450)
RBC # BLD AUTO: 3.03 10E12/L (ref 3.8–5.2)
RBC #/AREA URNS AUTO: <1 /HPF (ref 0–2)
SOURCE: ABNORMAL
SP GR UR STRIP: 1 (ref 1–1.03)
SPECIMEN EXP DATE BLD: NORMAL
SQUAMOUS #/AREA URNS AUTO: 2 /HPF (ref 0–1)
TRANS CELLS #/AREA URNS HPF: <1 /HPF (ref 0–1)
UROBILINOGEN UR STRIP-MCNC: NORMAL MG/DL (ref 0–2)
WBC # BLD AUTO: 1.6 10E9/L (ref 4–11)
WBC #/AREA URNS AUTO: 8 /HPF (ref 0–5)

## 2018-12-14 PROCEDURE — 25000128 H RX IP 250 OP 636: Performed by: INTERNAL MEDICINE

## 2018-12-14 PROCEDURE — 86901 BLOOD TYPING SEROLOGIC RH(D): CPT | Performed by: INTERNAL MEDICINE

## 2018-12-14 PROCEDURE — 12000032 ZZH R&B OB CRITICAL UMMC

## 2018-12-14 PROCEDURE — 36415 COLL VENOUS BLD VENIPUNCTURE: CPT | Performed by: INTERNAL MEDICINE

## 2018-12-14 PROCEDURE — 81001 URINALYSIS AUTO W/SCOPE: CPT | Performed by: STUDENT IN AN ORGANIZED HEALTH CARE EDUCATION/TRAINING PROGRAM

## 2018-12-14 PROCEDURE — 85025 COMPLETE CBC W/AUTO DIFF WBC: CPT | Performed by: INTERNAL MEDICINE

## 2018-12-14 PROCEDURE — 25000128 H RX IP 250 OP 636: Performed by: STUDENT IN AN ORGANIZED HEALTH CARE EDUCATION/TRAINING PROGRAM

## 2018-12-14 PROCEDURE — 87086 URINE CULTURE/COLONY COUNT: CPT | Performed by: OBSTETRICS & GYNECOLOGY

## 2018-12-14 PROCEDURE — 86850 RBC ANTIBODY SCREEN: CPT | Performed by: INTERNAL MEDICINE

## 2018-12-14 PROCEDURE — 86780 TREPONEMA PALLIDUM: CPT | Performed by: INTERNAL MEDICINE

## 2018-12-14 PROCEDURE — 86900 BLOOD TYPING SEROLOGIC ABO: CPT | Performed by: INTERNAL MEDICINE

## 2018-12-14 PROCEDURE — 87653 STREP B DNA AMP PROBE: CPT | Performed by: INTERNAL MEDICINE

## 2018-12-14 PROCEDURE — 25000132 ZZH RX MED GY IP 250 OP 250 PS 637: Performed by: INTERNAL MEDICINE

## 2018-12-14 RX ORDER — PENICILLIN G POTASSIUM 5000000 [IU]/1
5 INJECTION, POWDER, FOR SOLUTION INTRAMUSCULAR; INTRAVENOUS ONCE
Status: COMPLETED | OUTPATIENT
Start: 2018-12-14 | End: 2018-12-14

## 2018-12-14 RX ORDER — BETAMETHASONE SODIUM PHOSPHATE AND BETAMETHASONE ACETATE 3; 3 MG/ML; MG/ML
12 INJECTION, SUSPENSION INTRA-ARTICULAR; INTRALESIONAL; INTRAMUSCULAR; SOFT TISSUE EVERY 24 HOURS
Status: DISCONTINUED | OUTPATIENT
Start: 2018-12-14 | End: 2018-12-15 | Stop reason: HOSPADM

## 2018-12-14 RX ORDER — OXYTOCIN/0.9 % SODIUM CHLORIDE 30/500 ML
100-340 PLASTIC BAG, INJECTION (ML) INTRAVENOUS CONTINUOUS PRN
Status: DISCONTINUED | OUTPATIENT
Start: 2018-12-14 | End: 2018-12-15 | Stop reason: HOSPADM

## 2018-12-14 RX ORDER — CARBOPROST TROMETHAMINE 250 UG/ML
250 INJECTION, SOLUTION INTRAMUSCULAR
Status: DISCONTINUED | OUTPATIENT
Start: 2018-12-14 | End: 2018-12-15 | Stop reason: HOSPADM

## 2018-12-14 RX ORDER — ACETAMINOPHEN 325 MG/1
650 TABLET ORAL EVERY 4 HOURS PRN
Status: DISCONTINUED | OUTPATIENT
Start: 2018-12-14 | End: 2018-12-15 | Stop reason: HOSPADM

## 2018-12-14 RX ORDER — NALOXONE HYDROCHLORIDE 0.4 MG/ML
.1-.4 INJECTION, SOLUTION INTRAMUSCULAR; INTRAVENOUS; SUBCUTANEOUS
Status: DISCONTINUED | OUTPATIENT
Start: 2018-12-14 | End: 2018-12-15 | Stop reason: HOSPADM

## 2018-12-14 RX ORDER — IBUPROFEN 800 MG/1
800 TABLET, FILM COATED ORAL
Status: DISCONTINUED | OUTPATIENT
Start: 2018-12-14 | End: 2018-12-15 | Stop reason: HOSPADM

## 2018-12-14 RX ORDER — FENTANYL CITRATE 50 UG/ML
50-100 INJECTION, SOLUTION INTRAMUSCULAR; INTRAVENOUS
Status: DISCONTINUED | OUTPATIENT
Start: 2018-12-14 | End: 2018-12-15 | Stop reason: HOSPADM

## 2018-12-14 RX ORDER — SODIUM CHLORIDE, SODIUM LACTATE, POTASSIUM CHLORIDE, CALCIUM CHLORIDE 600; 310; 30; 20 MG/100ML; MG/100ML; MG/100ML; MG/100ML
INJECTION, SOLUTION INTRAVENOUS CONTINUOUS
Status: DISCONTINUED | OUTPATIENT
Start: 2018-12-14 | End: 2018-12-15 | Stop reason: HOSPADM

## 2018-12-14 RX ORDER — METHYLERGONOVINE MALEATE 0.2 MG/ML
200 INJECTION INTRAVENOUS
Status: DISCONTINUED | OUTPATIENT
Start: 2018-12-14 | End: 2018-12-15 | Stop reason: HOSPADM

## 2018-12-14 RX ORDER — OXYTOCIN 10 [USP'U]/ML
10 INJECTION, SOLUTION INTRAMUSCULAR; INTRAVENOUS
Status: DISCONTINUED | OUTPATIENT
Start: 2018-12-14 | End: 2018-12-15 | Stop reason: HOSPADM

## 2018-12-14 RX ORDER — ONDANSETRON 2 MG/ML
4 INJECTION INTRAMUSCULAR; INTRAVENOUS EVERY 6 HOURS PRN
Status: DISCONTINUED | OUTPATIENT
Start: 2018-12-14 | End: 2018-12-14

## 2018-12-14 RX ORDER — ONDANSETRON 2 MG/ML
4 INJECTION INTRAMUSCULAR; INTRAVENOUS EVERY 6 HOURS PRN
Status: DISCONTINUED | OUTPATIENT
Start: 2018-12-14 | End: 2018-12-15 | Stop reason: HOSPADM

## 2018-12-14 RX ORDER — OXYCODONE AND ACETAMINOPHEN 5; 325 MG/1; MG/1
1 TABLET ORAL
Status: DISCONTINUED | OUTPATIENT
Start: 2018-12-14 | End: 2018-12-15 | Stop reason: HOSPADM

## 2018-12-14 RX ADMIN — Medication 300 MG: at 22:20

## 2018-12-14 RX ADMIN — BETAMETHASONE SODIUM PHOSPHATE AND BETAMETHASONE ACETATE 12 MG: 3; 3 INJECTION, SUSPENSION INTRA-ARTICULAR; INTRALESIONAL; INTRAMUSCULAR at 12:50

## 2018-12-14 RX ADMIN — SODIUM CHLORIDE, POTASSIUM CHLORIDE, SODIUM LACTATE AND CALCIUM CHLORIDE 1000 ML: 600; 310; 30; 20 INJECTION, SOLUTION INTRAVENOUS at 12:49

## 2018-12-14 RX ADMIN — SODIUM CHLORIDE, POTASSIUM CHLORIDE, SODIUM LACTATE AND CALCIUM CHLORIDE: 600; 310; 30; 20 INJECTION, SOLUTION INTRAVENOUS at 20:03

## 2018-12-14 RX ADMIN — PENICILLIN G POTASSIUM 5 MILLION UNITS: 5000000 POWDER, FOR SOLUTION INTRAMUSCULAR; INTRAPLEURAL; INTRATHECAL; INTRAVENOUS at 20:32

## 2018-12-14 RX ADMIN — SODIUM CHLORIDE, POTASSIUM CHLORIDE, SODIUM LACTATE AND CALCIUM CHLORIDE: 600; 310; 30; 20 INJECTION, SOLUTION INTRAVENOUS at 16:13

## 2018-12-14 RX ADMIN — SODIUM CHLORIDE, POTASSIUM CHLORIDE, SODIUM LACTATE AND CALCIUM CHLORIDE: 600; 310; 30; 20 INJECTION, SOLUTION INTRAVENOUS at 18:13

## 2018-12-14 ASSESSMENT — MIFFLIN-ST. JEOR: SCORE: 1248.34

## 2018-12-14 NOTE — TELEPHONE ENCOUNTER
Patient called to discuss continued symptoms of uterine cramping/contractions which have persisted since 6pm last night. Patient spoke with Dr. Friedman overnight who advised pt to come in to birthplace if symptoms persist after rest and hydration.     Patient currently denies LOF, bleeding, change in discharge. Denies severe pain but endorses general discomfort and constant tightening of the upper abdomen/uterus. Reports +FM.     Pt states she has had a lot of water overnight and did rest without much change in condition.     Per Dr. Friedman's recommendation, nurse advised patient to present to birthplace. Patient agrees with plan and has no further questions at this time    Nurse called to report to L&D.

## 2018-12-14 NOTE — PLAN OF CARE
Dr. Rodrigues in to see patient and do SVE.  Patient's cervix has changed and she has moved to room 442 per MD request.  Will continue to monitor patient closely.  Aileen Ramirez to assume care of patient.

## 2018-12-14 NOTE — H&P
Labor and Delivery History and Physical    Chelsea Stein MRN# [id number]   Age: 38 year old YOB: 1980     Date of Admission: 2018    Primary care provider: No Ref-Primary, Physician         CC:     contractions         HPI:   Chelsea Stein is a 38 year old  at 34w6d by LMP c/w 7w5d US admitted given concern for  labor. She was initially evaluated in triage after more than 12 hours of abdominal pain and tightening. She felt the tightening along her RUQ and LUQ. She tried to orally hydrate, and the pain improved slightly, but when it was still present in the late morning, she came in for evaluation. She did not labor in her prior pregnancy and is unsure if these are contractions. She reports good fetal movement. She has been feeling well overall. She denies fever, chest pain, shortness of breath, nausea, vomiting, or other systemic concerns.    While in triage, her cervix was checked and found to be 1.5/80/-2/soft/midposition. Two and half hours later, she reported an increase in intensity of contractions, which correlated with the contractions on the tocometer. Cervical exam was 2/90/-1/soft/midposition.     OB Problem List:   1. IUP at 34w6d   2. SLE, no renal involvement. S/p MFM consult.  - Patient is SSA positive.  - No steroid course in > 2 years  - TSH wnl  - On plaquenil, 300 mg daily  - Low dose aspirin for preeclampsia prevention  - Normal fetal echos performed through 28 weeks  3. Leukopenia/neutropenia with left shift  - S/p hematology and BM Bx in [place] (which was normal)  4. History of  x1 for placenta previa  5. Cervical polyp, 3.5 x 2 cm with occasional postcoital spotting  6. AMA  7. GBS unknown status         Pregnancy history:     OBSTETRIC HISTORY:    Obstetric History       T0      L1     SAB0   TAB0   Ectopic0   Multiple0   Live Births1       # Outcome Date GA Lbr Curt/2nd Weight Sex Delivery Anes PTL Lv   2 Current             1   36w6d    CS-LTranv   JAYNE          Prenatal Labs:   Lab Results   Component Value Date    CHPCRT Negative 2018    GCPCRT Negative 2018    HGB 10.6 (L) 2018       GBS Status:   No results found for: GBS    Active Problem List  Patient Active Problem List   Diagnosis     Supervision of high-risk pregnancy of elderly multigravida     Systemic lupus erythematosus (H)     Cervical polyp     Leukopenia     History of  delivery, antepartum     Encounter for triage in pregnant patient     Abdominal pain      labor       Medication Prior to Admission  Medications Prior to Admission   Medication Sig Dispense Refill Last Dose     ASPIRIN PO Take 81 mg by mouth daily   2018 at 2330     Ferrous Sulfate (IRON SUPPLEMENT PO) Take 65 mg by mouth daily   2018 at 2330     hydroxychloroquine (PLAQUENIL) 200 MG tablet 300 mg/day (1.5 pills) Annual Plaquenil toxicity eye screening required. 130 tablet 3 2018 at 2330     Prenatal Vit-Fe Fumarate-FA (PRENATAL MULTIVITAMIN PLUS IRON) 27-0.8 MG TABS per tablet Take 1 tablet by mouth daily   2018 at 1730           Maternal Past Medical History:     Past Medical History:   Diagnosis Date     Lupus (systemic lupus erythematosus) (H)     SSA+                       Family History:   No family history on file.         Social History:     Social History     Socioeconomic History     Marital status:      Spouse name: Not on file     Number of children: Not on file     Years of education: Not on file     Highest education level: Not on file   Social Needs     Financial resource strain: Not on file     Food insecurity - worry: Not on file     Food insecurity - inability: Not on file     Transportation needs - medical: Not on file     Transportation needs - non-medical: Not on file   Occupational History     Not on file   Tobacco Use     Smoking status: Never Smoker     Smokeless tobacco: Never Used   Substance and  "Sexual Activity     Alcohol use: No     Drug use: No     Sexual activity: Yes     Partners: Male     Birth control/protection: None   Other Topics Concern     Not on file   Social History Narrative     Not on file            Review of Systems:   Negative except as noted above in the HPI         Physical Exam:     Vitals:    18 1052   Resp: 16   Temp: 98.3  F (36.8  C)   TempSrc: Oral   Weight: 59.4 kg (131 lb)   Height: 1.6 m (5' 3\")     Gen: Alert and oriented in NAD  Cardio: RRR, no murmurs  Resp: CTAB   Abdomen: gravid, soft, nontender. EFW 5.5 lbs  Cervix: 2/90/-1, mid, soft  Presentation: Cephalic by BSUS  Fetal Heart Rate Tracing: baseline 125 bpm, moderate variability, + accels, no decels  Tocometer: 1-3/10    Imaging:    Dating Ultrasound   GA: 7w5d      Single IUP, +Fetal cardiac activity   Fetal Anatomy Survey   GA: 20w3d      Single IUP, Fetal Anatomy WNL, 3 VC, Placenta: posterior           Assessment:   Chelsea Stein is a 38 year old  at 34w6d by LMP c/w 7w5d US admitted given concern for  labor with painful contractions and spontaneous cervical change.         Plan:     Admit to L&D for further monitoring  - Check cvx PRN with increased contraction frequency/intensity  - Pain: per patient preference  - Regular diet   - Desires TOLAC if laboring spontaneously  - GBS unknown status. Collected in triage. Will start PCN with ROM or active labor if results have not returned.    SLE  - Continue plaquenil, 300 mg daily  - Hold ASA  - Fetal EKG after birth    Fetal well being  - Fetal heart tracing reactive and appropriate for gestational age  - Betamethasone #1 given in triage. Will give second dose in 24 hours  - NICU consult placed    Angelita Rodrigues MD  Resident Physician-PGY3   2018, 3:03 PM       [facility] staff:  Appreciate note by Dr. Rodrigues.  I have seen and examined the patient without the resident. I have reviewed, edited, and agree with the note. "      Ashanti Heart MD  12/14/2018  4:15 PM

## 2018-12-14 NOTE — PLAN OF CARE
Data: Contraction pattern stable and within parameters. Fetal assessment Reactive.  Interventions: Continue uterine/fetal assessment as ordered. Activity level:Bed rest with bathroom privileges, and preventive measures including Medications, Positioning and Frequent voiding. Encourage active range of motion and frequent position changes.  Plan: Continue expectant management. Observe for and notify care provider of indications of progressing labor or signs of fetal/maternal compromise.

## 2018-12-14 NOTE — PROGRESS NOTES
"Obstetrics Triage Note    HPI:  Chelsea Stein is a 38 year old  female at 34w6d by LMP consistent with 7w5d US here with complaints of abdominal pain and tightening. She states that she starting last, she experienced she had an intense feeling of tightening in a band along her RUQ and LUQ.  This pain has improved somewhat with fluids, but is still there which is why she came in.  She otherwise denies vaginal bleeding or LOF.  Reports +FM.  She states that she has otherwise been feeling well. She denies fever, N/V, chest pain, SOB or other systemic symptoms.   Pregnancy complicated by:  - SLE on Plaquenil, s/p MFM consult  - H/o prior CS: placenta previa, has never labored    ROS:  Negative except as mentioned in HPI.    PMH:  Past Medical History:   Diagnosis Date     Lupus (systemic lupus erythematosus) (H)     SSA+     PSHx:  Past Surgical History:   Procedure Laterality Date      SECTION  2014     Medications:  Medications Prior to Admission   Medication Sig Dispense Refill Last Dose     ASPIRIN PO Take 81 mg by mouth daily   2018 at 2330     Ferrous Sulfate (IRON SUPPLEMENT PO) Take 65 mg by mouth daily   2018 at 2330     hydroxychloroquine (PLAQUENIL) 200 MG tablet 300 mg/day (1.5 pills) Annual Plaquenil toxicity eye screening required. 130 tablet 3 2018 at 2330     Prenatal Vit-Fe Fumarate-FA (PRENATAL MULTIVITAMIN PLUS IRON) 27-0.8 MG TABS per tablet Take 1 tablet by mouth daily   2018 at 1730     Allergies:  Allergies   Allergen Reactions     Benadryl [Diphenhydramine] Other (See Comments)     Sedation and feel like dying     Seasonal Allergies      Other reaction(s): Other: See Comments  Congestion     Physical Exam:   Patient Vitals for the past 24 hrs:   Temp Temp src Resp Height Weight   18 1052 98.3  F (36.8  C) Oral 16 1.6 m (5' 3\") 59.4 kg (131 lb)     Gen: resting comfortably, in NAD  CV: RRR, no m/r/g  Pulm: CTAB, no increased work of " breathing  Abd: soft, gravid, minimally tender on palpation in epigastric region, non-distended  Cx: 1.5/80/-2/soft/mid    NST:  FHT: , moderate variability, + accels, no decels  [place]: 2 ctx in 10 min    Labs:  None    Assessment/Plan: Chelsea Stein is a 38 year old female  at 34w6d by LMP, here for abdominal pain with SVE concerning for PTL    - Will start with 1L fluid bolus  - Will give BMZ for fetal lung maturity  - Repeat SVE in 2 hours  - No tocolytics given >34w  - UA collected    Sign out given to oncoming resident    Edwina Rodrigues MD  OBGYN Resident PGY2  2018 11:46 AM        Ashanti Heart MD  2018

## 2018-12-14 NOTE — TELEPHONE ENCOUNTER
Patient is a 39 yo  @ 34w5d who calls in this evening with concerns of tightening in her upper abdomen.  Patient states this happened a couple nights ago too but ended up going away quickly.  Tonight, she feels like it has been persistent for the last 2 hours and it feels tight, not painful, but doesn't necessarily feel like it is coming in waves.  She denies VB or LOF.  + FM.  Denies any other concerning symptoms.  Had CS with first pregnancy due to previa and is planning TOLAC.  Has never had contractions before and not sure what to expect.  Has not eaten much this evening since getting home from work, probably did not hydrate as well as possible.  We discussed her hydrating, eating and taking a bath/resting and seeing her symptoms go away.  If they persist despite these interventions or she has any other changes, I recommend she come into [facility] for evaluation as it is hard to really gauge what her symptoms mean solely based on what she is reporting given she does not necessarily feel her symptoms are in waves similar to typical contraction pain.  Had BPP on  earlier today.  Patient understands and is agreeable with this plan.  Myrtle Friedman MD

## 2018-12-15 VITALS
DIASTOLIC BLOOD PRESSURE: 57 MMHG | WEIGHT: 131 LBS | TEMPERATURE: 98.3 F | OXYGEN SATURATION: 100 % | BODY MASS INDEX: 23.21 KG/M2 | SYSTOLIC BLOOD PRESSURE: 96 MMHG | RESPIRATION RATE: 16 BRPM | HEART RATE: 96 BPM | HEIGHT: 63 IN

## 2018-12-15 LAB
BACTERIA SPEC CULT: NO GROWTH
GP B STREP DNA SPEC QL NAA+PROBE: NEGATIVE
SPECIMEN SOURCE: NORMAL
SPECIMEN SOURCE: NORMAL
T PALLIDUM AB SER QL: NONREACTIVE

## 2018-12-15 PROCEDURE — 25000128 H RX IP 250 OP 636: Performed by: STUDENT IN AN ORGANIZED HEALTH CARE EDUCATION/TRAINING PROGRAM

## 2018-12-15 PROCEDURE — 99231 SBSQ HOSP IP/OBS SF/LOW 25: CPT | Performed by: NURSE PRACTITIONER

## 2018-12-15 PROCEDURE — 25000128 H RX IP 250 OP 636: Performed by: INTERNAL MEDICINE

## 2018-12-15 RX ADMIN — SODIUM CHLORIDE, POTASSIUM CHLORIDE, SODIUM LACTATE AND CALCIUM CHLORIDE: 600; 310; 30; 20 INJECTION, SOLUTION INTRAVENOUS at 06:21

## 2018-12-15 RX ADMIN — BETAMETHASONE SODIUM PHOSPHATE AND BETAMETHASONE ACETATE 12 MG: 3; 3 INJECTION, SUSPENSION INTRA-ARTICULAR; INTRALESIONAL; INTRAMUSCULAR at 12:57

## 2018-12-15 RX ADMIN — Medication 2.5 MILLION UNITS: at 00:36

## 2018-12-15 RX ADMIN — Medication 2.5 MILLION UNITS: at 04:32

## 2018-12-15 NOTE — DISCHARGE INSTRUCTIONS
Discharge Instruction for Undelivered Patients      You were seen for: Labor Assessment  We Consulted: Dr Bailey  You had (Test or Medicine):see MAR  Diet:   Drink 8 to 12 glasses of liquids (milk, juice, water) every day.  You may eat meals and snacks.     Activity:  Count fetal kicks everyday (see handout)  Call your doctor or nurse midwife if your baby is moving less than usual.     Call your provider if you notice:  Swelling in your face or increased swelling in your hands or legs.  Headaches that are not relieved by Tylenol (acetaminophen).  Changes in your vision (blurring: seeing spots or stars.)  Nausea (sick to your stomach) and vomiting (throwing up).   Weight gain of 5 pounds or more per week.  Heartburn that doesn't go away.  Signs of bladder infection: pain when you urinate (use the toilet), need to go more often and more urgently.  The bag of griffith (rupture of membranes) breaks, or you notice leaking in your underwear.  Bright red blood in your underwear.  Abdominal (lower belly) or stomach pain.  For first baby: Contractions (tightening) less than 5 minutes apart for one hour or more.  Second (plus) baby: Contractions (tightening) less than 10 minutes apart and getting stronger.  *If less than 34 weeks: Contractions (tightenings) more than 6 times in one hour.  Increase or change in vaginal discharge (note the color and amount)  Other:     Follow-up:  As scheduled in the clinic

## 2018-12-15 NOTE — PROGRESS NOTES
Notified that pt is feeling contractions more painfully, now Q3-5 min.     , mod yael, + accels, no decels.     SVE at 1830 was 2//-1    A/P: given worsening contractions will plan to start penicillin for GBS unknown/ status. SVE in a few hours or PRN.      Nina Bower PGY-3     [facility] staff:  Appreciate note by Dr. Bower.  I have seen and examined the patient without the resident~5 PM. I have reviewed, edited, and agree with the note.        Joanie Montoya MD, FACOG  2018  9:43 PM

## 2018-12-15 NOTE — PLAN OF CARE
Patient is stable, no cervical change from the last cervical check. Will continue to monitor patient and notify the provider with changes.

## 2018-12-15 NOTE — PROVIDER NOTIFICATION
12/14/18 2025   Provider Notification   Provider Name/Title Dr. Bower   Method of Notification In Department   Request Evaluate - Remote   Notification Reason Uterine Activity     Notified Dr. Bower in department that pt was ctx q2-4 minutes, pt states ctx stronger/tighter. Dr. Bower stated to begin penicillin per orders. 1st dose of penicillin given at 2032.

## 2018-12-15 NOTE — PROVIDER NOTIFICATION
12/15/18 0724   Provider Notification   Provider Name/Title Dr. Bower   Method of Notification At Bedside   Request Evaluate in Person   Notification Reason SVE   Dr. Berry at bedside for SVE.  No change noted per MD.  Will discontinue penicillin per MD and take patient off monitor for breakfast and shower.  Plan is to put patient back on monitor after shower and patient to have SVE after 2nd beta dose this afternoon.  RN will notify MD with any changes in status.

## 2018-12-15 NOTE — PROGRESS NOTES
"Chelsea Stein is a 38 year old female patient.  No diagnosis found.  Past Medical History:   Diagnosis Date     Lupus (systemic lupus erythematosus) (H)     SSA+     No current outpatient medications on file.     Allergies   Allergen Reactions     Benadryl [Diphenhydramine] Other (See Comments)     Sedation and feel like dying     Seasonal Allergies      Other reaction(s): Other: See Comments  Congestion     Active Problems:    Abdominal pain     labor    Blood pressure 96/57, pulse 96, temperature 98.3  F (36.8  C), temperature source Oral, resp. rate 16, height 1.6 m (5' 3\"), weight 59.4 kg (131 lb), last menstrual period 2018, SpO2 100 %, not currently breastfeeding.    Subjective  Objective  Assessment & Plan    Aileen Ramirez  12/15/2018    "

## 2018-12-15 NOTE — PLAN OF CARE
Patient's VSS. Afebrile. [place] showing 3-6 contractions an hour along with irritibility/movement. Patient feeling some ctx as tightness, sleeping through others. Sleeping some, tossing and turning and complaining of discomfort with Lupus. Declines pain interventions besides rest. Patient has had a couple small pink/old blood mucus in toilet upon voiding. FHR reactive. LR running at 125mL/hr. Getting abx q4hr for GBS unknown. Will receive 2nd beta today at 12:50pm. Plan to recheck cervix this morning and create plan from there per Dr. Bower. Here alone for now,  at home with young son. Continue to monitor.

## 2018-12-15 NOTE — DISCHARGE SUMMARY
[facility] Discharge Summary    Chelsea Stein MRN# [id number]   Age: 38 year old YOB: 1980     Date of Admission:  2018  Date of Discharge:  12/15/2018  Admitting Physician:  Ashanti Heart MD  Discharge Physician:  Maci Bailey MD     Admission Diagnosis:  1. IUP at 34w6d   2. SLE, no renal involvement. S/p MFM consult.  - Patient is SSA positive.  - No steroid course in > 2 years  - TSH wnl  - On plaquenil, 300 mg daily  - Low dose aspirin for preeclampsia prevention  - Normal fetal echos performed through 28 weeks  3. Leukopenia/neutropenia with left shift  - S/p hematology and BM Bx in [place] (which was normal)  4. History of  x1 for placenta previa  5. Cervical polyp, 3.5 x 2 cm with occasional postcoital spotting  6. AMA  7. GBS unknown status    Discharge Diagnosis:  Same   contractions   IUP at 35w0d  S/p BMZ x 2 -12/15    Procedures:  None    Consultations:  NICU    Medications prior to admission:  Medications Prior to Admission   Medication Sig Dispense Refill Last Dose     ASPIRIN PO Take 81 mg by mouth daily   2018 at 2330     Ferrous Sulfate (IRON SUPPLEMENT PO) Take 65 mg by mouth daily   2018 at 2330     hydroxychloroquine (PLAQUENIL) 200 MG tablet 300 mg/day (1.5 pills) Annual Plaquenil toxicity eye screening required. 130 tablet 3 2018 at 2330     Prenatal Vit-Fe Fumarate-FA (PRENATAL MULTIVITAMIN PLUS IRON) 27-0.8 MG TABS per tablet Take 1 tablet by mouth daily   2018 at 1730         Brief History of Presentation:    Chelsea Stein is a 38 year old  at 34w6d by LMP c/w 7w5d US admitted given concern for  labor. She was initially evaluated in triage after more than 12 hours of abdominal pain and tightening. She felt the tightening along her RUQ and LUQ. She tried to orally hydrate, and the pain improved slightly, but when it was still present in the late morning, she came in  for evaluation. She did not labor in her prior pregnancy and is unsure if these are contractions. She reports good fetal movement. She has been feeling well overall. She denies fever, chest pain, shortness of breath, nausea, vomiting, or other systemic concerns.     While in triage, her cervix was checked and found to be 1.5/80/-2/soft/midposition. Two and half hours later, she reported an increase in intensity of contractions, which correlated with the contractions on the tocometer. Cervical exam was 2/90/-1/soft/midposition.      Hospital Course:  The patient was admitted to monitor  contractions. After greater than 12 hours, her cervix remained unchanged at 2/90/-1 and contractions did not increase. Her work-up for  labor was negative. GBS was collected and is pending at the time of discharge. She was given betamethasone x 2 on  and 12/15. Her FHR tracing was category I with accelerations. She was discharged home in stable condition.      Discharge Instructions:  Call or present to labor and delivery if you experience:   -Regular painful contractions concerning for labor   -Leakage of fluid concerning for ruptured membranes   -Decreased fetal movement   -Bright red vaginal bleeding    -Headache, vision changes, upper abdominal pain, significant increase in swelling,   generalized unwell feeling    Follow up:  Follow up in clinic on 2018 with Dr. Bailey.       Discharge Medications:  Current Discharge Medication List      CONTINUE these medications which have NOT CHANGED    Details   ASPIRIN PO Take 81 mg by mouth daily      Ferrous Sulfate (IRON SUPPLEMENT PO) Take 65 mg by mouth daily      hydroxychloroquine (PLAQUENIL) 200 MG tablet 300 mg/day (1.5 pills) Annual Plaquenil toxicity eye screening required.  Qty: 130 tablet, Refills: 3    Comments: Patient taking 300 mg (1.5 pills) daily  Associated Diagnoses: Systemic lupus complicating pregnancy (H)      Prenatal Vit-Fe Fumarate-FA  (PRENATAL MULTIVITAMIN PLUS IRON) 27-0.8 MG TABS per tablet Take 1 tablet by mouth daily           Shaye Lopez MD, MHS  Ob/Gyn PGY3  12/15/18      Appreciate note by Dr. Lopez. Patient has been seen and examined by me separate from the resident, agree with above note.     Maci Bailey MD  3:20 PM

## 2018-12-15 NOTE — PROVIDER NOTIFICATION
12/15/18 0540   Provider Notification   Provider Name/Title Dr. Bower   Method of Notification Electronic Page   Notification Reason Status Update   Pt states that overall feels like things are improving but feels occasional tightening and cramping. Able to sleep on and off. Is the plan to check cervix this morning?

## 2018-12-15 NOTE — PLAN OF CARE
Data: Contraction pattern stable and within parameters. Fetal assessment Reactive.  Interventions: Continue uterine/fetal assessment as ordered. Activity level:Regular activity, and preventive measures including Medications, Positioning and Frequent voiding. Encourage active range of motion and frequent position changes.  Plan: Continue expectant management. Observe for and notify care provider of indications of progressing labor or signs of fetal/maternal compromise.

## 2018-12-15 NOTE — PLAN OF CARE
Pt continues to ctx q2-4 minutes, but discomfort/tightness decreasing since 2030. Had pink spotting after SVE, resolved. Plan per provider is for pt to rest and complete next SVE at 5am - unless their is a change. Continue administering penicillin per orders. LR at 125/hr continuously. Will continue to monitor.

## 2018-12-15 NOTE — PROGRESS NOTES
"Antepartum progress note    S: feeling better this morning. Contractions have improved significantly and she was able to sleep overnight. No bleeding or LOF. Good FM.     O:   Patient Vitals for the past 24 hrs:   BP Temp Temp src Pulse Resp SpO2 Height Weight   12/15/18 0435 100/56 98.2  F (36.8  C) Oral -- 16 -- -- --   12/15/18 0045 94/56 97.8  F (36.6  C) Oral 96 18 -- -- --   18 2107 127/71 97.9  F (36.6  C) Oral -- 16 -- -- --   18 1705 99/67 98.1  F (36.7  C) Oral -- 16 100 % -- --   18 1052 -- 98.3  F (36.8  C) Oral -- 16 -- 1.6 m (5' 3\") 59.4 kg (131 lb)        Gen: NAD  Cervix: 2/90/-1    Membranes: intact     FHT: , moderate variability, accels present, no decels  [place]: Q3-10 minutes (difficult to tell)    A/P Chelsea Gamez a 38 year old  at 35w0d admitted for concern for PTL. Pregnancy c/b SLE (on plaquenil), Leukopenia, and Hx of  x1 for placenta previa.    #.PTL - made cervical change yesterday afternoon from 1.5/80/-2 to current exam but exam has been unchanged for > 12 hours. She had painful contractions overnight so penicillin was started but we will stop this this morning. She received BMZ #1 at 12:50 on . Anticipate she will stay at least until BMZ #2, then could potentially discharge if she remains unchanged.   - GBS pending, UCx pending.    # PNC: Rh pos, RI    # SLE: cont PTA medications    # Fetal well being  - Category 1 FHT overnight, cephalic      Nina Cheli PGY-3  OB/Gyn  12/15/2018, 7:21 AM      Appreciate note by Dr. Bower. Patient has been seen and examined by me separate from the resident, agree with above note. Contractions have decreased, \"tightening\" not painful. Will give BMZ#2 this afternoon and reassess with likely discharge home.     Maci Bailey MD  11:39 AM        "

## 2018-12-15 NOTE — CONSULTS
[facility]          Neonatology Advanced Practice Antepartum Counseling Consult    I was asked to provide antepartum counseling for Chelsea Stein at the request of Joanie Montoya MD secondary to  labor at 35 weeks gestation. History is significant for maternal SLE. First dose of Betamethasone was administered on 18. Ms. Stein was counseled on the criteria for admission to the NICU, expected hospital course, potential risks, and outcomes associated with an infant born at approximately 35 weeks gestation. The counseling included: respiratory support options, hypoglycemia, hypothermia, and breast feeding.    The patient had no remaining questions but was encouraged to contact the NICU via their caregivers should any arise. Thank you for involving the NICU team in the care of this patient.       Floor Time (min): 5  Face to Face Time (min): 10  Total Time (minutes): 15  More than 50% of my time was spent in direct, face to face, antepartum counseling with the above patient.      JOO Duron, CNP   Advanced Practice Provider   Intensive Care Unit  [facility]

## 2018-12-18 ENCOUNTER — OFFICE VISIT (OUTPATIENT)
Dept: OBGYN | Facility: CLINIC | Age: 38
End: 2018-12-18
Attending: OBSTETRICS & GYNECOLOGY
Payer: COMMERCIAL

## 2018-12-18 ENCOUNTER — ANCILLARY PROCEDURE (OUTPATIENT)
Dept: ULTRASOUND IMAGING | Facility: CLINIC | Age: 38
End: 2018-12-18
Attending: OBSTETRICS & GYNECOLOGY
Payer: COMMERCIAL

## 2018-12-18 VITALS
SYSTOLIC BLOOD PRESSURE: 99 MMHG | HEART RATE: 92 BPM | DIASTOLIC BLOOD PRESSURE: 62 MMHG | WEIGHT: 133.9 LBS | BODY MASS INDEX: 23.72 KG/M2

## 2018-12-18 DIAGNOSIS — O09.529 SUPERVISION OF HIGH-RISK PREGNANCY OF ELDERLY MULTIGRAVIDA: Primary | ICD-10-CM

## 2018-12-18 DIAGNOSIS — O09.90 HIGH-RISK PREGNANCY, UNSPECIFIED TRIMESTER: ICD-10-CM

## 2018-12-18 PROBLEM — Z36.89 ENCOUNTER FOR TRIAGE IN PREGNANT PATIENT: Status: RESOLVED | Noted: 2018-11-20 | Resolved: 2018-12-18

## 2018-12-18 PROCEDURE — G0463 HOSPITAL OUTPT CLINIC VISIT: HCPCS | Mod: 25

## 2018-12-18 PROCEDURE — 76819 FETAL BIOPHYS PROFIL W/O NST: CPT

## 2018-12-18 NOTE — NURSING NOTE
Chief Complaint   Patient presents with     Prenatal Care     CHAVA 35 weeks 3 days   Whit Reynoso LPN

## 2018-12-18 NOTE — LETTER
2018     RE: Chelsea Stein  [address]  [address]  [facility] [address]     Dear Colleague,    Thank you for referring your patient, Chelsea Stein, to the [facility] at [facility]. Please see a copy of my visit note below.    [facility]  Return OB Visit    S: Was admitted -12/15 for r/o of pre-term labor due to increased abdominal tightness and questionable contractions and was discharged  Has ongoing upper uterine tightness, worse at night. Occasional lower cramping. She has a history of migraines and had a brief aura last night which never progressed to a headache, and has not had any migraines throughout this pregnancy. She noticed increased clear vaginal discharge 2 days ago, but was only throughout the day and has not had any since. Denies vaginal bleeding or LOF, and reports good fetal movement.     O: BP 99/62   Pulse 92   Wt 60.7 kg (133 lb 14.4 oz)   LMP 2018   BMI 23.72 kg/m      Weight gain: 1.2 kg since LV 18    Gen: Well-appearing, NAD  Cervix: 3/90/-1, cephalic     BPP: 8/8    A/P:  Chelsea Stein is a 38 year old  at 35w3d by LMP c/w 7w5d US, here for return OB visit s/p discharge from L&D for r/o of pre-term labor from -12/15. Continued upper abdominal tightness though at lower level than during her admission. Cervical exam shows slight increase in cervical dilation and continued significant effacement, no signs of active labor.    1. Routine Prenatal Care  - Rh +, Ab -  - RPR non-reactive, HIV non-reactive, Hep B non-reactive, Rubella immune  - Anatomy US today, placenta posterior, cepahlic  - GCT wnl,   2. SLE: SSA positive, no renal involvement, labs stable from 18  3. H/o LTCS 2/2 placenta previa - desires TOLAC   4. AMA - NIPT wnl  5. GBS negative   6. R/o  labor - admitted -12/15 for contractions, cervical change. Found to be stable on rpt cervical exams, no LOF  or increasing contractions and fetus category I. Given BMZ x2 during admission. Currently stable with mild increase in dilation though no signs of active labor. Reviewed labor signs, recommend evaluation if contractions increase. Discussed need for in-hospital monitoring of labor due to h/o CS    Return to clinic in 1 weeks.     Note by: Deshawn Rodriguez, MS III acting as a scribe for Dr. Leo CHEN, Dr. Bailey, personally performed the services described in this documentation, as scribed by Deshawn Rodriguez in my presence, and it is both accurate and complete.   Maci Bailey MD

## 2018-12-18 NOTE — PROGRESS NOTES
[facility]  Return OB Visit    S: Was admitted -12/15 for r/o of pre-term labor due to increased abdominal tightness and questionable contractions and was discharged  Has ongoing upper uterine tightness, worse at night. Occasional lower cramping. She has a history of migraines and had a brief aura last night which never progressed to a headache, and has not had any migraines throughout this pregnancy. She noticed increased clear vaginal discharge 2 days ago, but was only throughout the day and has not had any since. Denies vaginal bleeding or LOF, and reports good fetal movement.     O: BP 99/62   Pulse 92   Wt 60.7 kg (133 lb 14.4 oz)   LMP 2018   BMI 23.72 kg/m     Weight gain: 1.2 kg since LV 18    Gen: Well-appearing, NAD  Cervix: 3/90/-1, cephalic     BPP:     A/P:  Chelsea Stein is a 38 year old  at 35w3d by LMP c/w 7w5d US, here for return OB visit s/p discharge from L&D for r/o of pre-term labor from -12/15. Continued upper abdominal tightness though at lower level than during her admission. Cervical exam shows slight increase in cervical dilation and continued significant effacement, no signs of active labor.    1. Routine Prenatal Care  - Rh +, Ab -  - RPR non-reactive, HIV non-reactive, Hep B non-reactive, Rubella immune  - Anatomy US today, placenta posterior, cepahlic  - GCT wnl,   2. SLE: SSA positive, no renal involvement, labs stable from 18  3. H/o LTCS 2/2 placenta previa - desires TOLAC   4. AMA - NIPT wnl  5. GBS negative   6. R/o  labor - admitted -12/15 for contractions, cervical change. Found to be stable on rpt cervical exams, no LOF or increasing contractions and fetus category I. Given BMZ x2 during admission. Currently stable with mild increase in dilation though no signs of active labor. Reviewed labor signs, recommend evaluation if contractions increase. Discussed need for in-hospital monitoring of labor due to h/o  CS    Return to clinic in 1 weeks.     Note by: Deshawn Rodriguez, MS III acting as a scribe for Dr. Bailey  I, Dr. Bailey, personally performed the services described in this documentation, as scribed by Deshawn Rodriguez in my presence, and it is both accurate and complete.   Maci Bailey MD

## 2018-12-27 ENCOUNTER — OFFICE VISIT (OUTPATIENT)
Dept: OBGYN | Facility: CLINIC | Age: 38
End: 2018-12-27
Payer: COMMERCIAL

## 2018-12-27 ENCOUNTER — ANCILLARY PROCEDURE (OUTPATIENT)
Dept: ULTRASOUND IMAGING | Facility: CLINIC | Age: 38
End: 2018-12-27
Payer: COMMERCIAL

## 2018-12-27 VITALS
BODY MASS INDEX: 23.26 KG/M2 | DIASTOLIC BLOOD PRESSURE: 72 MMHG | SYSTOLIC BLOOD PRESSURE: 102 MMHG | WEIGHT: 131.3 LBS | HEART RATE: 97 BPM

## 2018-12-27 DIAGNOSIS — O36.63X0 EXCESSIVE FETAL GROWTH AFFECTING MANAGEMENT OF PREGNANCY IN THIRD TRIMESTER, SINGLE OR UNSPECIFIED FETUS: ICD-10-CM

## 2018-12-27 DIAGNOSIS — M32.9 SYSTEMIC LUPUS ERYTHEMATOSUS, UNSPECIFIED SLE TYPE, UNSPECIFIED ORGAN INVOLVEMENT STATUS (H): ICD-10-CM

## 2018-12-27 DIAGNOSIS — O09.293 HIGH RISK PREGNANCY DUE TO HISTORY OF PREVIOUS OBSTETRICAL PROBLEM IN THIRD TRIMESTER: Primary | ICD-10-CM

## 2018-12-27 DIAGNOSIS — O09.90 HIGH-RISK PREGNANCY, UNSPECIFIED TRIMESTER: ICD-10-CM

## 2018-12-27 PROCEDURE — 76816 OB US FOLLOW-UP PER FETUS: CPT

## 2018-12-27 PROCEDURE — G0463 HOSPITAL OUTPT CLINIC VISIT: HCPCS | Mod: ZF

## 2018-12-27 ASSESSMENT — PAIN SCALES - GENERAL: PAINLEVEL: NO PAIN (0)

## 2018-12-27 NOTE — PROGRESS NOTES
Return OB visit    S: Pt reports she feels well today.  She does report having an increase in migraines with aura and whereas she normally gets them a couple times a year reports 2 or 3 episodes of migraine with aura this past week.  She says the actual headaches have been very mild and attributes it to lots of screen time at work.  She denies any weakness with these migraines and aside from their increase in frequency they are not different in quality.  Otherwise reports very mild occasional contractions much less so than 2 weeks ago. Denies vaginal bleeding, leaking fluid. Some increase in vaginal discharge. Normal fetal movement.       O: LMP 2018      Abd: soft, gravid, nontender  Doptones:deferred US today  FHT: deferred growth today     BPP today      A/P:   38 year old  at 32w5d by LMP c/w 7w5d US, for [facility] visit. Pregnancy complicated by: SLE, AMA, history of , admission for  labor -15, and now LGA.     #) SLE, no renal involvement: S/p MFM consult with recommendations as below   - Normal flare-ups are joint pain, Raynaud's, photosensivity  - Antibodies to Ro and La (SSA, SSB) - pt is SSA positive. Lupus anticoagulant, Anticardiolipin antibodies, & anti-beta2 glycoprotein 1 antibodies - negative   - last seen by Rheum 10/15/18, follow up in March  - TSH wnl, 1.2 (18)  - Continue Plaquenil 300 mg  - Low dose aspirin for preeclampsia prevention  - S/p normal serial fetal echos thru 28wks due to positive SSA antibody and 2-5% risk of fetal heart block,  12-lead EKG for baby  - Serial ultrasound for fetal growth after 24 weeks- last done today  EFW 3822g (98%tile, AC>99%tile) - Continue weekly BPP, today   - C3, C4, dsDNA, CRP, ESR, CBC w/diff, LFTs q3mo per Rheum (next in ); BP checks via q2wk visits - UP:C trend: 0.23 () > unable to calculate (10/30)>0.26 ()>0.20 ()  - Stress dose steroids during labor if chronic steroids are necessary  during the pregnancy.     #) Suspected LGA fetus  - Reviewed diet and weight gain, encouraged continued light exercise and healthy diet  - Discussed that fetus is not so large that  section is recommended and will continue to manage expectantly    #) Leukopenia/Neutropenia with Left shift (2% blasts)   - S/p Hematology and BM Bx in [place], BMBx wnl   - Thought to be due to SLE     #) History of CS x1 for placenta previa  - She desires TOLAC and consent form signed on  with Dr. Grimm     #) Cervical polyp 3.5x2cm with occasional postcoital spotting   - can reassess at time of delivery if no problems     #) AMA   - NIPT wnl     #) Routine pregnancy   - No concerns on prenatal labs, other than leukopenia related to SLE  - normal anatomy, posterior placenta at 20w3d  - GCT normal  - S/p Tdap & flu shot (10/15 & 10/30)  - plans for condoms vs Mirena IUD for postpartum contraception  - GBS neg ()  - delivery by 39-40 weeks    Return to care in 1 week    Discussed with Dr. Mcgee.    Amy Schumer, MD  Obstetrics and Gynecology, PGY-2  18      The Patient was seen in [facility] by SCHUMER, AMY.  I reviewed the history & exam. Assessment and plan were jointly made.    Vicki Mcgee MD

## 2018-12-27 NOTE — NURSING NOTE
Chief Complaint   Patient presents with     Prenatal Care   36.5 weeks ob visit  Has had two migraines in the last week.  With auras

## 2018-12-29 ENCOUNTER — ANESTHESIA (OUTPATIENT)
Dept: OBGYN | Facility: CLINIC | Age: 38
End: 2018-12-29
Payer: COMMERCIAL

## 2018-12-29 ENCOUNTER — HOSPITAL ENCOUNTER (INPATIENT)
Facility: CLINIC | Age: 38
LOS: 2 days | Discharge: HOME HEALTH | End: 2018-12-31
Attending: OBSTETRICS & GYNECOLOGY | Admitting: OBSTETRICS & GYNECOLOGY
Payer: COMMERCIAL

## 2018-12-29 ENCOUNTER — ANESTHESIA EVENT (OUTPATIENT)
Dept: OBGYN | Facility: CLINIC | Age: 38
End: 2018-12-29
Payer: COMMERCIAL

## 2018-12-29 DIAGNOSIS — O09.529 SUPERVISION OF HIGH-RISK PREGNANCY OF ELDERLY MULTIGRAVIDA: Primary | ICD-10-CM

## 2018-12-29 LAB
ABO + RH BLD: NORMAL
ABO + RH BLD: NORMAL
BASOPHILS # BLD AUTO: 0 10E9/L (ref 0–0.2)
BASOPHILS NFR BLD AUTO: 0 %
BLD GP AB SCN SERPL QL: NORMAL
BLOOD BANK CMNT PATIENT-IMP: NORMAL
DIFFERENTIAL METHOD BLD: ABNORMAL
EOSINOPHIL # BLD AUTO: 0 10E9/L (ref 0–0.7)
EOSINOPHIL NFR BLD AUTO: 0 %
ERYTHROCYTE [DISTWIDTH] IN BLOOD BY AUTOMATED COUNT: 14.1 % (ref 10–15)
HCT VFR BLD AUTO: 34.4 % (ref 35–47)
HGB BLD-MCNC: 11.5 G/DL (ref 11.7–15.7)
IMM GRANULOCYTES # BLD: 0 10E9/L (ref 0–0.4)
IMM GRANULOCYTES NFR BLD: 0 %
LYMPHOCYTES # BLD AUTO: 0.5 10E9/L (ref 0.8–5.3)
LYMPHOCYTES NFR BLD AUTO: 25.9 %
MCH RBC QN AUTO: 32.9 PG (ref 26.5–33)
MCHC RBC AUTO-ENTMCNC: 33.4 G/DL (ref 31.5–36.5)
MCV RBC AUTO: 98 FL (ref 78–100)
MONOCYTES # BLD AUTO: 0.2 10E9/L (ref 0–1.3)
MONOCYTES NFR BLD AUTO: 11.9 %
NEUTROPHILS # BLD AUTO: 1.2 10E9/L (ref 1.6–8.3)
NEUTROPHILS NFR BLD AUTO: 62.2 %
NRBC # BLD AUTO: 0 10*3/UL
NRBC BLD AUTO-RTO: 0 /100
PLATELET # BLD AUTO: 124 10E9/L (ref 150–450)
RBC # BLD AUTO: 3.5 10E12/L (ref 3.8–5.2)
SPECIMEN EXP DATE BLD: NORMAL
WBC # BLD AUTO: 1.9 10E9/L (ref 4–11)

## 2018-12-29 PROCEDURE — 10907ZC DRAINAGE OF AMNIOTIC FLUID, THERAPEUTIC FROM PRODUCTS OF CONCEPTION, VIA NATURAL OR ARTIFICIAL OPENING: ICD-10-PCS | Performed by: OBSTETRICS & GYNECOLOGY

## 2018-12-29 PROCEDURE — 0DQR0ZZ REPAIR ANAL SPHINCTER, OPEN APPROACH: ICD-10-PCS | Performed by: OBSTETRICS & GYNECOLOGY

## 2018-12-29 PROCEDURE — 25000128 H RX IP 250 OP 636

## 2018-12-29 PROCEDURE — 36415 COLL VENOUS BLD VENIPUNCTURE: CPT | Performed by: OBSTETRICS & GYNECOLOGY

## 2018-12-29 PROCEDURE — 25000125 ZZHC RX 250: Performed by: ANESTHESIOLOGY

## 2018-12-29 PROCEDURE — 86901 BLOOD TYPING SEROLOGIC RH(D): CPT | Performed by: OBSTETRICS & GYNECOLOGY

## 2018-12-29 PROCEDURE — 86900 BLOOD TYPING SEROLOGIC ABO: CPT | Performed by: OBSTETRICS & GYNECOLOGY

## 2018-12-29 PROCEDURE — 25000125 ZZHC RX 250

## 2018-12-29 PROCEDURE — 86850 RBC ANTIBODY SCREEN: CPT | Performed by: OBSTETRICS & GYNECOLOGY

## 2018-12-29 PROCEDURE — 3E0R3BZ INTRODUCTION OF ANESTHETIC AGENT INTO SPINAL CANAL, PERCUTANEOUS APPROACH: ICD-10-PCS | Performed by: ANESTHESIOLOGY

## 2018-12-29 PROCEDURE — 25000132 ZZH RX MED GY IP 250 OP 250 PS 637: Performed by: OBSTETRICS & GYNECOLOGY

## 2018-12-29 PROCEDURE — 0UQMXZZ REPAIR VULVA, EXTERNAL APPROACH: ICD-10-PCS | Performed by: OBSTETRICS & GYNECOLOGY

## 2018-12-29 PROCEDURE — 85025 COMPLETE CBC W/AUTO DIFF WBC: CPT | Performed by: OBSTETRICS & GYNECOLOGY

## 2018-12-29 PROCEDURE — 12000032 ZZH R&B OB CRITICAL UMMC

## 2018-12-29 PROCEDURE — 00HU33Z INSERTION OF INFUSION DEVICE INTO SPINAL CANAL, PERCUTANEOUS APPROACH: ICD-10-PCS | Performed by: ANESTHESIOLOGY

## 2018-12-29 RX ORDER — OXYTOCIN 10 [USP'U]/ML
INJECTION, SOLUTION INTRAMUSCULAR; INTRAVENOUS
Status: DISCONTINUED
Start: 2018-12-29 | End: 2018-12-30 | Stop reason: WASHOUT

## 2018-12-29 RX ORDER — LIDOCAINE 40 MG/G
CREAM TOPICAL
Status: DISCONTINUED | OUTPATIENT
Start: 2018-12-29 | End: 2018-12-30

## 2018-12-29 RX ORDER — LIDOCAINE HYDROCHLORIDE AND EPINEPHRINE 15; 5 MG/ML; UG/ML
INJECTION, SOLUTION EPIDURAL PRN
Status: DISCONTINUED | OUTPATIENT
Start: 2018-12-29 | End: 2019-01-29 | Stop reason: HOSPADM

## 2018-12-29 RX ORDER — ONDANSETRON 2 MG/ML
4 INJECTION INTRAMUSCULAR; INTRAVENOUS EVERY 6 HOURS PRN
Status: DISCONTINUED | OUTPATIENT
Start: 2018-12-29 | End: 2018-12-30

## 2018-12-29 RX ORDER — EPHEDRINE SULFATE 50 MG/ML
5 INJECTION, SOLUTION INTRAMUSCULAR; INTRAVENOUS; SUBCUTANEOUS
Status: DISCONTINUED | OUTPATIENT
Start: 2018-12-29 | End: 2018-12-30

## 2018-12-29 RX ORDER — OXYTOCIN/0.9 % SODIUM CHLORIDE 30/500 ML
PLASTIC BAG, INJECTION (ML) INTRAVENOUS
Status: COMPLETED
Start: 2018-12-29 | End: 2018-12-29

## 2018-12-29 RX ORDER — EPHEDRINE SULFATE 50 MG/ML
INJECTION, SOLUTION INTRAMUSCULAR; INTRAVENOUS; SUBCUTANEOUS
Status: DISCONTINUED
Start: 2018-12-29 | End: 2018-12-30 | Stop reason: HOSPADM

## 2018-12-29 RX ORDER — FENTANYL/BUPIVACAINE/NS/PF 2-1250MCG
PLASTIC BAG, INJECTION (ML) INJECTION
Status: COMPLETED
Start: 2018-12-29 | End: 2018-12-29

## 2018-12-29 RX ORDER — NALOXONE HYDROCHLORIDE 0.4 MG/ML
.1-.4 INJECTION, SOLUTION INTRAMUSCULAR; INTRAVENOUS; SUBCUTANEOUS
Status: DISCONTINUED | OUTPATIENT
Start: 2018-12-29 | End: 2018-12-30

## 2018-12-29 RX ORDER — MISOPROSTOL 200 UG/1
TABLET ORAL
Status: COMPLETED
Start: 2018-12-29 | End: 2018-12-30

## 2018-12-29 RX ORDER — SODIUM CHLORIDE, SODIUM LACTATE, POTASSIUM CHLORIDE, CALCIUM CHLORIDE 600; 310; 30; 20 MG/100ML; MG/100ML; MG/100ML; MG/100ML
INJECTION, SOLUTION INTRAVENOUS
Status: COMPLETED
Start: 2018-12-29 | End: 2018-12-29

## 2018-12-29 RX ORDER — NALBUPHINE HYDROCHLORIDE 10 MG/ML
2.5-5 INJECTION, SOLUTION INTRAMUSCULAR; INTRAVENOUS; SUBCUTANEOUS EVERY 6 HOURS PRN
Status: DISCONTINUED | OUTPATIENT
Start: 2018-12-29 | End: 2018-12-30

## 2018-12-29 RX ORDER — CALCIUM CARBONATE 500 MG/1
500 TABLET, CHEWABLE ORAL DAILY PRN
Status: DISCONTINUED | OUTPATIENT
Start: 2018-12-29 | End: 2018-12-30

## 2018-12-29 RX ORDER — LIDOCAINE HYDROCHLORIDE 10 MG/ML
INJECTION, SOLUTION INFILTRATION; PERINEURAL
Status: DISCONTINUED
Start: 2018-12-29 | End: 2018-12-30 | Stop reason: HOSPADM

## 2018-12-29 RX ORDER — OXYTOCIN/0.9 % SODIUM CHLORIDE 30/500 ML
1-24 PLASTIC BAG, INJECTION (ML) INTRAVENOUS CONTINUOUS
Status: DISCONTINUED | OUTPATIENT
Start: 2018-12-29 | End: 2018-12-30

## 2018-12-29 RX ADMIN — Medication: at 14:29

## 2018-12-29 RX ADMIN — OXYTOCIN-SODIUM CHLORIDE 0.9% IV SOLN 30 UNIT/500ML 2 MILLI-UNITS/MIN: 30-0.9/5 SOLUTION at 20:31

## 2018-12-29 RX ADMIN — SODIUM CHLORIDE, POTASSIUM CHLORIDE, SODIUM LACTATE AND CALCIUM CHLORIDE 1000 ML: 600; 310; 30; 20 INJECTION, SOLUTION INTRAVENOUS at 13:30

## 2018-12-29 RX ADMIN — LIDOCAINE HYDROCHLORIDE,EPINEPHRINE BITARTRATE 3 ML: 15; .005 INJECTION, SOLUTION EPIDURAL; INFILTRATION; INTRACAUDAL; PERINEURAL at 14:33

## 2018-12-29 RX ADMIN — SODIUM CHLORIDE, POTASSIUM CHLORIDE, SODIUM LACTATE AND CALCIUM CHLORIDE 1000 ML: 600; 310; 30; 20 INJECTION, SOLUTION INTRAVENOUS at 14:22

## 2018-12-29 RX ADMIN — Medication 10 ML/HR: at 14:33

## 2018-12-29 RX ADMIN — Medication 2 MILLI-UNITS/MIN: at 20:31

## 2018-12-29 RX ADMIN — CALCIUM CARBONATE (ANTACID) CHEW TAB 500 MG 500 MG: 500 CHEW TAB at 23:04

## 2018-12-29 RX ADMIN — SODIUM CHLORIDE, POTASSIUM CHLORIDE, SODIUM LACTATE AND CALCIUM CHLORIDE 250 ML: 600; 310; 30; 20 INJECTION, SOLUTION INTRAVENOUS at 18:37

## 2018-12-29 ASSESSMENT — MIFFLIN-ST. JEOR: SCORE: 1248.34

## 2018-12-29 NOTE — ANESTHESIA PREPROCEDURE EVALUATION
"Anesthesia Pre-Procedure Evaluation    Patient: Chelsea Stein   MRN:     [id number] Gender:   female   Age:    38 year old :      1980        Preoperative Diagnosis: * No pre-op diagnosis entered *   * No procedures listed *     Past Medical History:   Diagnosis Date     Lupus (systemic lupus erythematosus) (H)     SSA+      Past Surgical History:   Procedure Laterality Date      SECTION  2014          Anesthesia Evaluation       history and physical reviewed .             ROS/MED HX    ENT/Pulmonary:  - neg pulmonary ROS     Neurologic:  - neg neurologic ROS     Cardiovascular:  - neg cardiovascular ROS       METS/Exercise Tolerance:     Hematologic:         Musculoskeletal:         GI/Hepatic:  - neg GI/hepatic ROS       Renal/Genitourinary:         Endo:         Psychiatric:         Infectious Disease:         Malignancy:         Other:                     [facility] AN PHYSICAL EXAM    Lab Results   Component Value Date    WBC 1.9 (L) 2018    HGB 11.5 (L) 2018    HCT 34.4 (L) 2018     (L) 2018    CRP <2.9 10/30/2018    SED 27 (H) 10/30/2018    POTASSIUM 4.1 2018    CR 0.45 (L) 10/30/2018    GLC 96 2018    ALBUMIN 2.8 (L) 10/30/2018    ALT 27 10/30/2018    AST 24 10/30/2018    TSH 1.200 2018       Preop Vitals  BP Readings from Last 3 Encounters:   18 109/64   18 102/72   18 99/62    Pulse Readings from Last 3 Encounters:   18 91   18 97   18 92      Resp Readings from Last 3 Encounters:   18 18   12/15/18 16   18 18    SpO2 Readings from Last 3 Encounters:   18 100%   10/15/18 100%      Temp Readings from Last 1 Encounters:   18 36.4  C (97.6  F) (Oral)    Ht Readings from Last 1 Encounters:   18 1.6 m (5' 3\")      Wt Readings from Last 1 Encounters:   18 59.4 kg (131 lb)    Estimated body mass index is 23.21 kg/m  as calculated from the following:    Height as of this " "encounter: 1.6 m (5' 3\").    Weight as of this encounter: 59.4 kg (131 lb).     LDA:  Peripheral IV 12/29/18 Left Hand (Active)   Number of days: 0       Intrathecal/Epidural Catheter 12/29/18 (Active)   Number of days: 0            Assessment:              Tobacco Use:  Active user of Tobacco     Plan:                             PONV Management:Adult Risk Factors: Female             neg OB JEREMY Davis MD  "

## 2018-12-29 NOTE — ANESTHESIA PROCEDURE NOTES
Epidural Procedure Note    Staff:     Anesthesiologist:  Valdo Patel MD  Location: OB     Procedure start time:  12/29/2018 2:20 PM   Pre-procedure checklist:   patient identified, IV checked, site marked, risks and benefits discussed, informed consent, monitors and equipment checked, pre-op evaluation, at physician/surgeon's request and post-op pain management      Correct Patient: Yes      Correct Position: Yes      Correct Site: Yes      Correct Procedure: Yes      Correct Laterality:  Yes    Site Marked:  Yes  Procedure:     Procedure:  Epidural catheter    ASA:  2    Diagnosis:  Labor    Position:  Sitting    Sterile Prep: chloraprep      Insertion site:  L2-3    Local skin infiltration:  1% lidocaine    amount (mL):  3    Approach:  Midline    Needle gauge (G):  17    Needle Length (in):  3.5    VARSHA at (cm):  3.5    Attempts:  1    Redirects:  0    Catheter gauge (G):  19    Catheter threaded easily: Yes      Threaded to cm at skin:  7    Threaded in epidural space (cm):  3.5    Paresthesias:  No    Aspiration negative for Heme or CSF: Yes      Test dose (mL):  3    Test dose time:  14:33    Test dose negative for signs of intravascular, subdural or intrathecal injection: Yes

## 2018-12-29 NOTE — PLAN OF CARE
[facility] at 1208 in labor. Patient admitted to labor and epidural placed at 1445, patient tolerated well. VSS. FHT appropriate see flow record, dong every 2-5 minutes. Patient resting comfortably, feeling a little pain window on the left, but is getting better with position change and PCEA bolus. Forebag AROM'd by Dr Owusu at 1500, patient 4/90/0 at this time. Patient denies any needs at this time, will continue to monitor closely for any maternal or fetal changes.

## 2018-12-29 NOTE — H&P
[facility]  History and Physical  Patient name: Chelsea Stein  MRN: [id number]  : 1980    CC: Labor admission    HPI: Chelsea Stein  is a 38 year old  who presents at 37w0d  by LMP c/w a 7w5d US for contractions in a pregnancy complicated by see below (See below for details).     She is feeling well today. Having painful contractions q 5-7 minutes. No LOF, no vaginal bleeding She notes occasional contractions, denies VB, LOF.    Complications of pregnancy/ PNC:  1. Routine Prenatal Care  - Rh +, Ab -  - RPR non-reactive, HIV non-reactive, Hep B non-reactive, Rubella immune  - Anatomy US today, placenta posterior, cepahlic  - GCT wnl,   2. SLE: SSA positive, no renal involvement, labs stable from 18 - on plaquenil 300 mg  3. H/o LTCS 2/ placenta previa - desires TOLAC   4. AMA - NIPT wnl  5. GBS negative   6. R/o  labor - admitted -12/15 for contractions, cervical change. Found to be stable on rpt cervical exams, no LOF or increasing contractions and fetus category I. Given BMZ x2 during admission.  7. Leukopenia, thrombocytopenia, stable throughout pregnancy.     OB Hx:  G1: C/S for placenta previa    Review Of Systems  Eyes: No vision changes  Ears/Nose/Throat: No rhinorrhea, throat soreness  Respiratory: No shortness of breath, dyspnea on exertion, cough, or hemoptysis  Cardiovascular: negative for, chest pain and exertional chest pain or pressure  Gastrointestinal: negative for, nausea, vomiting, constipation and diarrhea  Genitourinary: negative for and dysuria  Musculoskeletal: negative for, back pain and joint pain  Neurologic: negative for headaches  Hematologic/Lymphatic/Immunologic: negative for, anemia and bleeding disorder  Endocrine: negative for, thyroid disorder and diabetes    PMH:  Lupus    PSH:    sectionx1    Social Hx:  Lives with  and child. Psychologist at [employer]    Meds:   See above. plaquinel    Allergies:     Allergies  "  Allergen Reactions     Benadryl [Diphenhydramine] Other (See Comments)     Sedation and feel like dying     Seasonal Allergies      Other reaction(s): Other: See Comments  Congestion       Physical Exam:  /69   Pulse 91   Temp 98.4  F (36.9  C) (Oral)   Resp 18   Ht 1.6 m (5' 3\")   Wt 59.4 kg (131 lb)   LMP 2018   SpO2 99%   BMI 23.21 kg/m     Constitutional: healthy, alert and no distress  Head: Normocephalic. No masses, lesions, tenderness or abnormalities  Cardiovascular: negative, RRR. No murmurs, clicks gallops or rub  Respiratory: negative. Good diaphragmatic excursion. Lungs clear  Gastrointestinal: Abdomen soft, non-tender. Gravid  Musculoskeletal: extremities normal- no gross deformities noted and normal muscle tone  Psychiatric: mentation appears normal and affect normal/bright  Cephalic by BSUS. Cervix initially seemed thin and 6 cm, after SROM 4/100/0  Forebag ruptured with second check see nursing notes for times    A/P:  38 year old  at 37w0d here with labor.    1. Admit for labor management   - Pain:epidural   - CBC   - Type and Screen   - GBS:negative   - EFW: 7 lb   - FWB: Cat 1    Anticipate     Patient Discussed with     Anticipated stay >2 days    Tammy Owusu MD 10:25 PM 2018   OB/GYN Staff -- Pt seen and examined by me. Agree with note as above.  MD Chely                        "

## 2018-12-29 NOTE — PROGRESS NOTES
"Progress Note    Comfortable after epidura.  O: BP 97/63   Pulse 91   Temp 98.3  F (36.8  C) (Oral)   Resp 16   Ht 1.6 m (5' 3\")   Wt 59.4 kg (131 lb)   LMP 2018   SpO2 99%   BMI 23.21 kg/m    EFM category1  toco Q3-4 min  cx deferred last ck 4 and AROM of forebag    A/P  at 37+0 TOLAC in sponataneous labor   S/p epidural  GBS neg  SLE  Baby needs EKG after birth, will alert peds.    MD Chely    "

## 2018-12-30 LAB
ERYTHROCYTE [DISTWIDTH] IN BLOOD BY AUTOMATED COUNT: 13.9 % (ref 10–15)
HCT VFR BLD AUTO: 30.2 % (ref 35–47)
HGB BLD-MCNC: 10.3 G/DL (ref 11.7–15.7)
MCH RBC QN AUTO: 33.6 PG (ref 26.5–33)
MCHC RBC AUTO-ENTMCNC: 34.1 G/DL (ref 31.5–36.5)
MCV RBC AUTO: 98 FL (ref 78–100)
PLATELET # BLD AUTO: 104 10E9/L (ref 150–450)
RBC # BLD AUTO: 3.07 10E12/L (ref 3.8–5.2)
WBC # BLD AUTO: 1.9 10E9/L (ref 4–11)

## 2018-12-30 PROCEDURE — 72200001 ZZH LABOR CARE VAGINAL DELIVERY SINGLE

## 2018-12-30 PROCEDURE — 85027 COMPLETE CBC AUTOMATED: CPT | Performed by: OBSTETRICS & GYNECOLOGY

## 2018-12-30 PROCEDURE — 40000977 ZZH STATISTIC ATTENDANCE AT DELIVERY

## 2018-12-30 PROCEDURE — 36415 COLL VENOUS BLD VENIPUNCTURE: CPT | Performed by: OBSTETRICS & GYNECOLOGY

## 2018-12-30 PROCEDURE — 12000028 ZZH R&B OB UMMC

## 2018-12-30 PROCEDURE — 25000132 ZZH RX MED GY IP 250 OP 250 PS 637: Performed by: STUDENT IN AN ORGANIZED HEALTH CARE EDUCATION/TRAINING PROGRAM

## 2018-12-30 PROCEDURE — 25000132 ZZH RX MED GY IP 250 OP 250 PS 637: Performed by: OBSTETRICS & GYNECOLOGY

## 2018-12-30 PROCEDURE — 25000128 H RX IP 250 OP 636

## 2018-12-30 PROCEDURE — 25000128 H RX IP 250 OP 636: Performed by: OBSTETRICS & GYNECOLOGY

## 2018-12-30 PROCEDURE — 25000132 ZZH RX MED GY IP 250 OP 250 PS 637

## 2018-12-30 RX ORDER — METHYLERGONOVINE MALEATE 0.2 MG/ML
200 INJECTION INTRAVENOUS
Status: DISCONTINUED | OUTPATIENT
Start: 2018-12-30 | End: 2018-12-31 | Stop reason: HOSPADM

## 2018-12-30 RX ORDER — POLYETHYLENE GLYCOL 3350 17 G/17G
17 POWDER, FOR SOLUTION ORAL DAILY
Status: DISCONTINUED | OUTPATIENT
Start: 2018-12-30 | End: 2018-12-31 | Stop reason: HOSPADM

## 2018-12-30 RX ORDER — LANOLIN 100 %
OINTMENT (GRAM) TOPICAL
Status: DISCONTINUED | OUTPATIENT
Start: 2018-12-30 | End: 2018-12-31 | Stop reason: HOSPADM

## 2018-12-30 RX ORDER — OXYTOCIN/0.9 % SODIUM CHLORIDE 30/500 ML
100 PLASTIC BAG, INJECTION (ML) INTRAVENOUS CONTINUOUS
Status: DISCONTINUED | OUTPATIENT
Start: 2018-12-30 | End: 2018-12-31 | Stop reason: HOSPADM

## 2018-12-30 RX ORDER — BISACODYL 10 MG
10 SUPPOSITORY, RECTAL RECTAL DAILY PRN
Status: DISCONTINUED | OUTPATIENT
Start: 2019-01-01 | End: 2018-12-31 | Stop reason: HOSPADM

## 2018-12-30 RX ORDER — METHYLERGONOVINE MALEATE 0.2 MG/1
200 TABLET ORAL EVERY 6 HOURS SCHEDULED
Status: COMPLETED | OUTPATIENT
Start: 2018-12-30 | End: 2018-12-30

## 2018-12-30 RX ORDER — IBUPROFEN 800 MG/1
800 TABLET, FILM COATED ORAL EVERY 6 HOURS PRN
Status: DISCONTINUED | OUTPATIENT
Start: 2018-12-30 | End: 2018-12-31 | Stop reason: HOSPADM

## 2018-12-30 RX ORDER — NALOXONE HYDROCHLORIDE 0.4 MG/ML
.1-.4 INJECTION, SOLUTION INTRAMUSCULAR; INTRAVENOUS; SUBCUTANEOUS
Status: DISCONTINUED | OUTPATIENT
Start: 2018-12-30 | End: 2018-12-31 | Stop reason: HOSPADM

## 2018-12-30 RX ORDER — METHYLERGONOVINE MALEATE 0.2 MG/ML
INJECTION INTRAVENOUS
Status: COMPLETED
Start: 2018-12-30 | End: 2018-12-30

## 2018-12-30 RX ORDER — OXYTOCIN 10 [USP'U]/ML
10 INJECTION, SOLUTION INTRAMUSCULAR; INTRAVENOUS
Status: DISCONTINUED | OUTPATIENT
Start: 2018-12-30 | End: 2018-12-31 | Stop reason: HOSPADM

## 2018-12-30 RX ORDER — MISOPROSTOL 200 UG/1
400 TABLET ORAL
Status: DISCONTINUED | OUTPATIENT
Start: 2018-12-30 | End: 2018-12-31 | Stop reason: HOSPADM

## 2018-12-30 RX ORDER — METHYLERGONOVINE MALEATE 0.2 MG/ML
200 INJECTION INTRAVENOUS EVERY 8 HOURS
Status: DISCONTINUED | OUTPATIENT
Start: 2018-12-30 | End: 2018-12-30

## 2018-12-30 RX ORDER — CALCIUM CARBONATE 500 MG/1
1000 TABLET, CHEWABLE ORAL 3 TIMES DAILY PRN
Status: DISCONTINUED | OUTPATIENT
Start: 2018-12-30 | End: 2018-12-31 | Stop reason: HOSPADM

## 2018-12-30 RX ORDER — AMOXICILLIN 250 MG
1 CAPSULE ORAL 2 TIMES DAILY
Status: DISCONTINUED | OUTPATIENT
Start: 2018-12-30 | End: 2018-12-31 | Stop reason: HOSPADM

## 2018-12-30 RX ORDER — HYDROCORTISONE 2.5 %
CREAM (GRAM) TOPICAL 3 TIMES DAILY PRN
Status: DISCONTINUED | OUTPATIENT
Start: 2018-12-30 | End: 2018-12-31 | Stop reason: HOSPADM

## 2018-12-30 RX ORDER — ACETAMINOPHEN 325 MG/1
650 TABLET ORAL EVERY 4 HOURS PRN
Status: DISCONTINUED | OUTPATIENT
Start: 2018-12-30 | End: 2018-12-31 | Stop reason: HOSPADM

## 2018-12-30 RX ORDER — OXYTOCIN/0.9 % SODIUM CHLORIDE 30/500 ML
340 PLASTIC BAG, INJECTION (ML) INTRAVENOUS CONTINUOUS PRN
Status: DISCONTINUED | OUTPATIENT
Start: 2018-12-30 | End: 2018-12-31 | Stop reason: HOSPADM

## 2018-12-30 RX ORDER — FENTANYL CITRATE 50 UG/ML
INJECTION, SOLUTION INTRAMUSCULAR; INTRAVENOUS
Status: COMPLETED
Start: 2018-12-30 | End: 2018-12-30

## 2018-12-30 RX ORDER — SENNOSIDES 8.6 MG
8.6 TABLET ORAL 2 TIMES DAILY
Status: DISCONTINUED | OUTPATIENT
Start: 2018-12-30 | End: 2018-12-30

## 2018-12-30 RX ORDER — AMOXICILLIN 250 MG
2 CAPSULE ORAL 2 TIMES DAILY
Status: DISCONTINUED | OUTPATIENT
Start: 2018-12-30 | End: 2018-12-31 | Stop reason: HOSPADM

## 2018-12-30 RX ORDER — CEFAZOLIN SODIUM 2 G/100ML
2 INJECTION, SOLUTION INTRAVENOUS ONCE
Status: COMPLETED | OUTPATIENT
Start: 2018-12-30 | End: 2018-12-30

## 2018-12-30 RX ADMIN — METHYLERGONOVINE MALEATE 200 MCG: 0.2 TABLET ORAL at 22:37

## 2018-12-30 RX ADMIN — METHYLERGONOVINE MALEATE 200 MCG: 0.2 INJECTION INTRAVENOUS at 00:02

## 2018-12-30 RX ADMIN — ACETAMINOPHEN 650 MG: 325 TABLET, FILM COATED ORAL at 02:56

## 2018-12-30 RX ADMIN — IBUPROFEN 800 MG: 800 TABLET ORAL at 02:56

## 2018-12-30 RX ADMIN — IBUPROFEN 800 MG: 800 TABLET ORAL at 16:03

## 2018-12-30 RX ADMIN — ACETAMINOPHEN 650 MG: 325 TABLET, FILM COATED ORAL at 06:31

## 2018-12-30 RX ADMIN — IBUPROFEN 800 MG: 800 TABLET ORAL at 22:37

## 2018-12-30 RX ADMIN — CEFAZOLIN SODIUM 2 G: 2 INJECTION, SOLUTION INTRAVENOUS at 00:45

## 2018-12-30 RX ADMIN — METHYLERGONOVINE MALEATE 200 MCG: 0.2 INJECTION, SOLUTION INTRAMUSCULAR; INTRAVENOUS at 09:02

## 2018-12-30 RX ADMIN — ACETAMINOPHEN 650 MG: 325 TABLET, FILM COATED ORAL at 21:23

## 2018-12-30 RX ADMIN — MISOPROSTOL 400 MCG: 200 TABLET ORAL at 00:03

## 2018-12-30 RX ADMIN — ACETAMINOPHEN 650 MG: 325 TABLET, FILM COATED ORAL at 17:48

## 2018-12-30 RX ADMIN — Medication 300 MG: at 21:23

## 2018-12-30 RX ADMIN — CALCIUM CARBONATE (ANTACID) CHEW TAB 500 MG 1000 MG: 500 CHEW TAB at 17:48

## 2018-12-30 RX ADMIN — IBUPROFEN 800 MG: 800 TABLET ORAL at 09:03

## 2018-12-30 RX ADMIN — SENNOSIDES AND DOCUSATE SODIUM 2 TABLET: 8.6; 5 TABLET ORAL at 09:03

## 2018-12-30 RX ADMIN — SENNOSIDES AND DOCUSATE SODIUM 2 TABLET: 8.6; 5 TABLET ORAL at 21:23

## 2018-12-30 RX ADMIN — METHYLERGONOVINE MALEATE 200 MCG: 0.2 TABLET ORAL at 16:03

## 2018-12-30 RX ADMIN — FENTANYL CITRATE: 50 INJECTION, SOLUTION INTRAMUSCULAR; INTRAVENOUS at 00:15

## 2018-12-30 RX ADMIN — ACETAMINOPHEN 650 MG: 325 TABLET, FILM COATED ORAL at 12:54

## 2018-12-30 NOTE — PROGRESS NOTES
Patient is feeling well, pushing well with contractions. FHR Cat 2 with recurrent decels that go back to baseline after pushing is over.     Vitals:    18 2100 18 2130 18 2205 18 2230   BP: 100/65 105/69 104/66 103/64   BP Location:       Pulse:       Resp: 16 18 18 16   Temp: 98.4  F (36.9  C)  98.6  F (37  C)    TempSrc: Oral  Oral    SpO2: 99% 99% 99% 99%   Weight:       Height:           Has been pushing for 50 minutes and she is parting the labia.    Continue pushing. Anticipate     Tammy Owusu MD 2018 11:08 PM

## 2018-12-30 NOTE — PLAN OF CARE
Vaginal Delivery Note   of viable Female with Dr Owusu and Dr Kam in attendance.  NICU/Nursery RN Jayde present.  Infant with spontaneous cry, to mother's abdomen, dried and stimulated.  APGAR at 1 minute:  7 and APGAR at 5 minutes:  9.  Placenta delivered with out complication, none, partial third degree laceration, with repair, shasha cares provided.  Mother and baby in stable condition.

## 2018-12-30 NOTE — PLAN OF CARE
Patient arrived to [facility] unit via wheelchair at 0230 ,with belongings, accompanied by spouse/ significant other, with infant in arms. Received report from LYDIA Vasquez  and checked bands. Unit and room orientation completd. Call light given; no concerns present at this time. Continue with plan of care.       Breastfeeding done with assist. Took Tylenol and Ibuprofen for pain.

## 2018-12-30 NOTE — PLAN OF CARE
Data: Vital signs within normal limits. Postpartum checks within normal limits - see flow record. Patient eating and drinking normally. Patient able to empty bladder independently and is up ambulating. No apparent signs of infection. Perineum  healing well but sore and swollen. Use ice, tucks and soaked in tub. Patient performing self cares and is able to care for infant.  Action: Patient medicated during the shift for pain and cramping. See MAR. Patient reassessed within 1 hour after each medication and pain was improved - patient stated she was comfortable. Patient education done about sitz bath soaks, perineal cares, side lying position, donut pillow, frequently voiding and using shasha bottle. See flow record.  Response: Positive attachment behaviors observed with infant. Support persons  Joseph present.   Plan: Anticipate discharge tomorrow.

## 2018-12-30 NOTE — L&D DELIVERY NOTE
OB Vaginal Delivery Note    Chelsea Stein MRN# [id number]   Age: 38 year old YOB: 1980       GA: 37w0d  GP:   Labor Complications:     EBL:    mL  QBL: 1144 mL  Delivery Type: Vaginal, Spontaneous   ROM to Delivery Time: 9h 11m   Weight: 3.59 kg (7 lb 14.6 oz)    1 Minute 5 Minute 10 Minute   Apgar Totals: 7    9          ADONIS, MILAD DEGROOT     Delivery Details:  Chelsea Stein, a 38 year old  female delivered a viable infant with apgars of 7   and 9  . Patient was fully dilated and pushing after 9  hours 5  minutes in active labor. Delivery was via vaginal, spontaneous  to a sterile field under epidural  anesthesia. Infant delivered in vertex  left  occiput  anterior  position. Anterior shoulders delivered without difficulty, posterior shoulder did not come so the posterior arm was delivered. The cord was clamped, cut twice and 3 vessels  were noted. Cord blood was obtained in routine fashion with the following disposition: lab .      Cord complications: nuchal   Placenta delivered at 2018 11:58 PM . Placental disposition was Hospital disposal . Fundal massage performed and fundus found to be firm.     Episiotomy: none    Perineum, vagina, cervix were inspected, and the following lacerations were noted:   Perineal lacerations: 3rd   periurethral laceration: bilateral        vaginal laceration noted         Any lacerations were repaired in the usual fashion using 2-0 an d3-0 vicryl with particular attention to re-enforcement of the anal sphincter. No stitches palpated in the sutures..    Excellent hemostasis was noted. Needle count correct. Infant and patient in delivery room in good and stable condition.        Labor Event Times    Labor onset date:  18 Onset time:   2:40 PM   Dilation complete date:  18 Complete time:  11:45 PM   Start pushing date/time:  2018 2350      Labor Length    1st Stage (hrs):  9 (min):  5   2nd Stage (hrs):  0 (min):  6    3rd Stage (hrs):  0 (min):  7      Labor Events     labor?:  No   steroids:  None  Labor Type:  Spontaneous, Augmentation  Predominate monitoring during 1st stage:  continuous electronic fetal monitoring     Antibiotics received during labor?:  No     Rupture identifier:  Rupture 1  Rupture date/time: 18 1440   Rupture type:  Artificial Rupture of Membranes  Fluid color:  Meconium     Augmentation:  AROM   Labor partogram used?:  no      Delivery/Placenta Date and Time    Delivery Date:  18 Delivery Time:  11:51 PM   Placenta Date/Time:  2018 11:58 PM  Oxytocin given at the time of delivery:  after delivery of baby     Vaginal Counts     Initial count performed by 2 team members:   Two Team Members   Christina Owusu       Needles Suture [employer]   Initial counts 2  5    Added to count  3 5    Final counts 2 3 10    Placed during labor Accounted for at the end of labor   No    No    No     Final count performed by 2 team members:   Two Team Members   Christina Owusu      Final count correct?:  Yes     Apgars    Living status:  Living   1 Minute 5 Minute 10 Minute 15 Minute 20 Minute   Skin color: 0  1       Heart rate: 2  2       Reflex irritability: 2  2       Muscle tone: 1  2       Respiratory effort: 2  2       Total: 7  9       Apgars assigned by:  JOO OREILLY,NNP-BC     Cord    Vessels:  3 Vessels Complications:  Nuchal   Cord Blood Disposition:  Lab Gases Sent?:  Yes       Resuscitation    Methods:  None   Care at Delivery:  Invited to attend this vaginal delivery for this early term infant born at 37w0d with meconium stained fluid. Infant delivered with grimace but decreased tone for gestational age and no cry. Infant stimulated and dried on mother's abdomen. No improvement in tone or cry. Umbilical cord clamped around 30 seconds of age. Infant brought to pre-warmed warmer. She was dried and stimulated and loud cry by 50  seconds of age. Infant continued to have loud cry. Pink in color. Infant wrapped and placed on mother's chest for skin-to-skin holding at 5 minutes of age. Parents updated. Report given to nursery RN for expected well baby managment. Nursery RN encouraged to call the NICU with any questions or concerns.    JOO Chacko, NNP-BC 2018 12:05 AM        Measurements    Weight:  7 lb 14.6 oz       Skin to Skin and Feeding Plan    Skin to skin initiation date/time: 1841    Skin to skin with:  Mother  Skin to skin end date/time:     How do you plan to feed your baby:  Breastfeeding     Labor Events and Shoulder Dystocia    Fetal Tracing Prior to Delivery:  Category 2  Shoulder dystocia present?:  Pos  Anterior shoulder:  right    Gentle attempt at traction, assisted by maternal expulsive forces?:  Yes       First Maneuver:  Delivery of the posterior arm       Delivery (Maternal) (Provider to Complete) ([id number])    Episiotomy:  None  Perineal lacerations:  3rd Repaired?:  Yes   Periurethral laceration:  bilateral    Vaginal laceration?:  Yes Repaired?:  Yes   Cervical laceration?:  No       Blood Loss  Mother: Chelsea Stein R #[id number]   Start of Mother's Information    IO Blood Loss  18 1440 - 18 0508    Total QBL Blood Loss (mL) Hospital Encounter 1144 mL    Total  1144 mL         End of Mother's Information  Mother: Chelsea Stein R #[id number]         Delivery - Provider to Complete ([id number])    Delivering clinician:  Sammi Kam MD  Attempted Delivery Types (Choose all that apply):  Spontaneous Vaginal Delivery  Delivery Type (Choose the 1 that will go to the Birth History):  Vaginal, Spontaneous   Other personnel:   Provider Role   Tammy Owusu MD          Placenta    Immediate Cord Clamping:  Done  Date/Time:  2018 11:58 PM  Removal:  Spontaneous  Disposition:  Hospital disposal     Anesthesia    Method:  Epidural  Cervical dilation at placement:   4-7          Presentation and Position    Presentation:  Vertex  Position:  Left Occiput Anterior           Tammy Owusu MD     OB/GYN Staff --  Agree with note as above. I was present in room for pushing and vaginal birth.  MD Chely

## 2018-12-30 NOTE — PLAN OF CARE
Data: Chelsea Stein transferred to [id number] via wheelchair at 0215. Baby transferred via parent's arms.  Action: Receiving unit notified of transfer: Yes. Patient and family notified of room change. Report given to Inge at 0200. Belongings sent to receiving unit. Accompanied by Registered Nurse. Oriented patient to surroundings. Call light within reach. ID bands double-checked with receiving RN.  Response: Patient tolerated transfer and is stable.

## 2018-12-30 NOTE — PROVIDER NOTIFICATION
"   12/29/18 1804   Provider Notification   Provider Name/Title Dr Owusu   Method of Notification Electronic Page   Request Evaluate in Person   Notification Reason Other (Comment);Fetal Baseline Change   FYI: pt had a low BP and , EPIC alerted me to alert you of \"sepsis.\" Can you also review FHT, baseline in 110s  "

## 2018-12-30 NOTE — PROGRESS NOTES
Patient name: Chelsea Stein  MRN: [id number]  : 1980    S: Patient feeling well this morning.  She continues to have some abdominal discomfort that is improved by pain medication.  She is tolerating a regular diet she is continuing to have Some bleeding it is more than a period.  She denies dizziness or lightheadedness and feels much better than she did after her last delivery.    O:  Vitals:    18 0110 18 0155 18 0230 18 0600   BP: 103/65 96/67 105/69 101/65   Pulse:   90 91   Resp:  17 16   Temp:   99.4  F (37.4  C) 98.9  F (37.2  C)   TempSrc:   Oral Oral   SpO2: 99% 100% 100% 99%   Weight:       Height:            Gen: comfortable appearing, no distress, alert, answering questions appropriately.  CV: Regular rate  Resp: Lungs clear to auscultation bilaterally  Abdomen: nontender, nondistended, firm fundus at umbilicus.  Ext: No lower extremity edema  Hgb 11.5> EBL 1142> Hgb 10.3   A/P: 38 year old 29 year old   PPD#1 s/p  who is doing well.  Lupus continue home 300 mg of Plaquenil    Thrombocytopenia and leukopenia stable from baseline in pregnancy will verify previous hematologic evaluation or further if necessary  Partial third-degree laceration MiraLAX and senna scheduled recommended donut pillow to patient.  All questions were answered.  Fetus with no residual effects from shoulder dystocia    A systematic from acute blood loss anemia  Heme: Asymptomatic from acute blood loss anemia.  Postpartum hemorrhage likely secondary to lacerations and uterine atony patient received Cytotec and will receive 24 hours of Methergine therapy  Neuro: Pain well controlled   CV/Resp: no issues  FEN/GI: Regular diet  : no issues  Rh: Positive; Rubella immune  History contraception: Not discussed  Anticipate D/C home PPD#2 given postpartum hemorrhage and late delivery    Tammy Owusu MD 2018 9:29 AM

## 2018-12-30 NOTE — PROVIDER NOTIFICATION
12/30/18 1601   Provider Notification   Provider Name/Title G3 Cheli   Method of Notification Electronic Page   Notification Reason Medication Request     Patient requesting tums, thanks!

## 2018-12-31 VITALS
SYSTOLIC BLOOD PRESSURE: 93 MMHG | OXYGEN SATURATION: 98 % | HEART RATE: 103 BPM | DIASTOLIC BLOOD PRESSURE: 64 MMHG | BODY MASS INDEX: 23.21 KG/M2 | HEIGHT: 63 IN | TEMPERATURE: 97.5 F | RESPIRATION RATE: 16 BRPM | WEIGHT: 131 LBS

## 2018-12-31 PROBLEM — Z34.90 PREGNANCY: Status: ACTIVE | Noted: 2018-12-31

## 2018-12-31 PROCEDURE — 25000132 ZZH RX MED GY IP 250 OP 250 PS 637: Performed by: OBSTETRICS & GYNECOLOGY

## 2018-12-31 PROCEDURE — 25000132 ZZH RX MED GY IP 250 OP 250 PS 637: Performed by: STUDENT IN AN ORGANIZED HEALTH CARE EDUCATION/TRAINING PROGRAM

## 2018-12-31 RX ORDER — AMOXICILLIN 250 MG
1 CAPSULE ORAL 2 TIMES DAILY
Qty: 60 TABLET | Refills: 0 | Status: SHIPPED | OUTPATIENT
Start: 2018-12-31 | End: 2019-04-01

## 2018-12-31 RX ADMIN — ACETAMINOPHEN 650 MG: 325 TABLET, FILM COATED ORAL at 06:23

## 2018-12-31 RX ADMIN — IBUPROFEN 800 MG: 800 TABLET ORAL at 16:38

## 2018-12-31 RX ADMIN — CALCIUM CARBONATE (ANTACID) CHEW TAB 500 MG 1000 MG: 500 CHEW TAB at 01:59

## 2018-12-31 RX ADMIN — ACETAMINOPHEN 650 MG: 325 TABLET, FILM COATED ORAL at 16:38

## 2018-12-31 RX ADMIN — SENNOSIDES AND DOCUSATE SODIUM 2 TABLET: 8.6; 5 TABLET ORAL at 08:14

## 2018-12-31 RX ADMIN — IBUPROFEN 800 MG: 800 TABLET ORAL at 03:43

## 2018-12-31 RX ADMIN — IBUPROFEN 800 MG: 800 TABLET ORAL at 10:26

## 2018-12-31 RX ADMIN — POLYETHYLENE GLYCOL 3350 17 G: 17 POWDER, FOR SOLUTION ORAL at 08:15

## 2018-12-31 RX ADMIN — ACETAMINOPHEN 650 MG: 325 TABLET, FILM COATED ORAL at 12:30

## 2018-12-31 RX ADMIN — ACETAMINOPHEN 650 MG: 325 TABLET, FILM COATED ORAL at 01:59

## 2018-12-31 NOTE — PLAN OF CARE
VSS. Postpartum assessment WNL. Plaquenil given for SLE. Voiding adequate amounts w/o difficulty. Partial 3rd deg laceration; encouraged sitz baths, offered donut pillow, and encouraged use of perineal spray. Ice and tucks applied for comfort. Pain managed with tylenol and IB. Miralax scheduled for this morning with kimberley. Breastfeeding with assistance. Difficult to sustain a latch; introduced use of nipple shield and initiated pumping. Concerns about lack of milk transfer able to hand express a few drops. Lactation to follow up with patient this morning. Continue to assess and support self cares and breastfeeding.

## 2018-12-31 NOTE — DISCHARGE SUMMARY
VAGINAL DELIVERY DISCHARGE SUMMARY    Admit date: 2018   Discharge date: 18    Admit Dx:   - 38 year old y/o  @ 37w0d GA  - SLE: SSA positive, no renal involvement, labs stable from 18 - on plaquenil 300 mg  - H/o LTCS 2/2 placenta previa - desires TOLAC  - AMA - NIPT wnl  - GBS negative   - R/o  labor - admitted -12/15 for contractions, cervical change. Found to be stable on rpt cervical exams, no LOF or increasing contractions and fetus category I. Given BMZ x2 during admission.  - Leukopenia, thrombocytopenia, stable throughout pregnancy.     Discharge Dx:  - Same as above, s/p delivery via   - Postpartum hemorrhage    Procedures:  - [facility]      Hospital course:  Chelsea Stein  is a 38 year old  who presents at 37w0d  by LMP c/w a 7w5d US for contractions. She is feeling well today. Having painful contractions q 5-7 minutes. No LOF, no vaginal bleeding She notes occasional contractions, denies VB, LOF.    Chelsea Stein, a 38 year old  female delivered a viable infant with apgars of 7   and 9  . Patient was fully dilated and pushing after 9  hours 5  minutes in active labor. Delivery was via vaginal, spontaneous  to a sterile field under epidural  anesthesia. Infant delivered in vertex  left  occiput  anterior  position. Anterior shoulders delivered without difficulty, posterior shoulder did not come so the posterior arm was delivered. The cord was clamped, cut twice and 3 vessels  were noted. Cord blood was obtained in routine fashion with the following disposition: lab .      Please see her admission H&P and Delivery Summary for full details regarding her admission and delivery.    Her postpartum course was uncomplicated. On PPD#2, she was meeting all of her postpartum goals and deemed stable for discharge. She was voiding without difficulty, tolerating a regular diet without nausea and vomiting, her pain was well controlled on oral pain medicines and  her lochia was appropriate. Her hemoglobin prior to delivery was 11.5 and after delivery was 10.3. Her Rh status was positive and RHOgam was/was not indicated.         Discharge/Disposition:  Ms. Chelsea Stein was discharged to home in stable condition with the following instructions/medications:  1) Call for temperature > 100.4, foul smelling vaginal discharge, bleeding > 1 pad per hour x 2 hrs, pain not controlled by oral pain meds, severe constipation or severe nausea or vomiting.  2) She was instructed to follow-up with her primary OB in 6 weeks for a routine postpartum visit   3) She was instructed to continue her PNV on discharge if she wished to breast feed her infant.  4) She was discharged home with the following medications:      Review of your medicines      START taking      Dose / Directions   senna-docusate 8.6-50 MG tablet  Commonly known as:  SENOKOT-S/PERICOLACE  Used for:  Supervision of high-risk pregnancy of elderly multigravida      Dose:  1 tablet  Take 1 tablet by mouth 2 times daily  Quantity:  60 tablet  Refills:  0        CONTINUE these medicines which have NOT CHANGED      Dose / Directions   hydroxychloroquine 200 MG tablet  Commonly known as:  PLAQUENIL  Used for:  Systemic lupus complicating pregnancy (H)      300 mg/day (1.5 pills) Annual Plaquenil toxicity eye screening required.  Quantity:  130 tablet  Refills:  3     IRON SUPPLEMENT PO      Dose:  65 mg  Take 65 mg by mouth daily  Refills:  0     prenatal multivitamin w/iron 27-0.8 MG tablet      Dose:  1 tablet  Take 1 tablet by mouth daily  Refills:  0        STOP taking    ASPIRIN PO              Where to get your medicines      These medications were sent to [place], [place] - [address]  [address] [address]    Phone:  [phone]     senna-docusate 8.6-50 MG tablet           Shaye Lopez MD, MHS  Ob/Gyn PGY3  12/31/2018   OB/GYN Staff -- Pt seen and examined by  me. Agree with note as above.  MD Chely

## 2018-12-31 NOTE — LACTATION NOTE
"This note was copied from a baby's chart.  Consult for concern about tight frenulum, infant delivered @ 37w0d. This is Chelsea's 2nd baby, first born at 36w6d. She struggled with supply, ended up about 60% breastmilk and 40% formula. Mom says they had a rough start, C/S with \"large amount of blood loss\" and he wouldn't latch & they didn't get any help with breastfeeding in hospital, saw LC @ 5 days of age. She had mastitis a few times while she was using nipple shield in first three months.  She reports breasts grew with pregnancy both times, along with pigment change of areola. Oral exam of infant appears WNL, suspect for posterior tongue restriction but today she is able to stretch well, brings her tongue beyond gumline when sucking on finger and nutritive sucking, swallows witnessed at breast. Mom is AMA @ 39 y/o, history of SLE (takes plaquenil 300 mg daily, Hale category L2) and leukopenia.     Chelsea independent in latching well, sometimes more of the top of areola in mouth than the bottom. Education about anatomy of breast and infant mouth and mechanics of feeding, tips on getting deep with higher, asymmetric latch, using breast massage, compressions prn during feeding to help with milk transfer, feeding log and outpatient breastfeeding resources, possible challenges with feeding in early term infants and ways to maximize supply. Stressed self care and getting enough rest to balance with pumping; if she can do 3-4 times daily may help her rest more (history of SLE) in first week or two will help boost supply. She demonstrated hand expression, gave infant about 3.5 mL via spoon during LC visit.     Plan: breastfeed on cue, at least 8-12 times in 24 hours; hand express after each feeding (until milk is in) and spoon feed results. When home, pump 3 to 4 times daily and see lactation consultant at [facility] around 1 to 1.5 weeks old. (history of low milk supply, maternal autoimmune disease, AMA & 37 week " delivery)

## 2018-12-31 NOTE — PLAN OF CARE
Postpartum stable. Discharge instructions were given to patient, attended discharge class so pt had no questions. Discharge medications were given to patient. Breast pump given to patient. Discharging home with spouse and baby. Transport called to bring pt down to lobby via wheelchair.

## 2018-12-31 NOTE — DISCHARGE INSTRUCTIONS
Return to clinic in 2 weeks for a mood check and 6 weeks for a postpartum visit.  If you decide to have IUD placed, let them know when making 6 week appointment.    Postpartum Vaginal Delivery Instructions    Activity       Ask family and friends for help when you need it.    Do not place anything in your vagina for 6 weeks.    You are not restricted on other activities, but take it easy for a few weeks to allow your body to recover from delivery.  You are able to do any activities you feel up to that point.    No driving until you have stopped taking your pain medications (usually two weeks after delivery).     Call your health care provider if you have any of these symptoms:       Increased pain, swelling, redness, or fluid around your stiches from an episiotomy or perineal tear.    A fever above 100.4 F (38 C) with or without chills when placing a thermometer under your tongue.    You soak a sanitary pad with blood within 1 hour, or you see blood clots larger than a golf ball.    Bleeding that lasts more than 6 weeks.    Vaginal discharge that smells bad.    Severe pain, cramping or tenderness in your lower belly area.    A need to urinate more frequently (use the toilet more often), more urgently (use the toilet very quickly), or it burns when you urinate.    Nausea and vomiting.    Redness, swelling or pain around a vein in your leg.    Problems breastfeeding or a red or painful area on your breast.    Chest pain and cough or are gasping for air.    Problems coping with sadness, anxiety, or depression.  If you have any concerns about hurting yourself or the baby, call your provider immediately.     You have questions or concerns after you return home.     Keep your hands clean:  Always wash your hands before touching your perineal area and stitches.  This helps reduce your risk of infection.  If your hands aren't dirty, you may use an alcohol hand-rub to clean your hands. Keep your nails clean and short.

## 2018-12-31 NOTE — LACTATION NOTE
This note was copied from a baby's chart.  Bedside RN assisted Chelsea to breastfeed. Infant has trouble achieving and maintaining a deep latch, especially on left side. Adhesions behind nipples do pose some difficulty. Assisted to latch/feed/relatch. Infant does have some tongue bunching with suck/swallow. Infant can extend tongue to lips, but did not go beyond. Unable to visualize frenulum at this time. Brother had to have a frenulum clip after birth, though this was done outpt. Peds to assess and consider frenulum clip in am. Hand expression education done with teach back demonstration. Spoke to mother about infant's gestational age, information on late pre-term infants (given by paper handout FOD) and feeding problems explained. Encouraged mother to hand express and feedback after every feeding. If a nipple shield is used, pumping should be done 3-4 times after feedings in 24 hours and follow up with an LC within a week of discharge. [facility] lactation consult put in for tomorrow AM. Continue to monitor/assess and assist as needed.

## 2018-12-31 NOTE — PLAN OF CARE
Data: Vital signs within normal limits. Postpartum checks within normal limits - see flow record. Patient eating and drinking normally. Patient able to empty bladder independently and is up ambulating. No apparent signs of infection. Perineum  healing well, sore and swollen. Patient performing self cares and is able to care for infant.  Action: Patient medicated during the shift for pain and cramping. See MAR. Patient reassessed within 1 hour after each medication and pain was improved - patient stated she was comfortable. Patient education done about discharge instructions (attended discharge class) and perineal cares. See flow record.  Response: Positive attachment behaviors observed with infant. Support persons  coming later today.   Plan: Anticipate discharge later today.

## 2019-01-04 ENCOUNTER — HOSPITAL ENCOUNTER (EMERGENCY)
Facility: CLINIC | Age: 39
Discharge: LEFT BEFORE DISCHARGE | End: 2019-01-04
Payer: COMMERCIAL

## 2019-01-04 ENCOUNTER — TELEPHONE (OUTPATIENT)
Dept: OBGYN | Facility: CLINIC | Age: 39
End: 2019-01-04

## 2019-01-04 VITALS — OXYGEN SATURATION: 98 % | DIASTOLIC BLOOD PRESSURE: 69 MMHG | TEMPERATURE: 98.3 F | SYSTOLIC BLOOD PRESSURE: 108 MMHG

## 2019-01-04 RX ORDER — OMEGA-3 FATTY ACIDS/FISH OIL 300-1000MG
600 CAPSULE ORAL EVERY 6 HOURS PRN
COMMUNITY
End: 2019-04-01

## 2019-01-04 RX ORDER — ACETAMINOPHEN 325 MG/1
650 TABLET ORAL EVERY 4 HOURS PRN
COMMUNITY
End: 2019-04-01

## 2019-01-04 NOTE — TELEPHONE ENCOUNTER
Patient called to report new symptom of fever which began today. Pt is 5 days post partum vaginal delivery.     Fever this afternoon was 101.5F. Pt also reports chills and body aches. Pt reports breast engorgement but no focal red, hard, tender or warm areas.     Pt denies any increase in pelvic pain, is able to control with occasional ibuprofen. Pt endorses her bleeding has decreased every day and is pink to red in coloration, changing approximately 5 pads per day but not saturated when she changes. Denies foul odor, denies abnormal discharge.     Nurse spoke with Dr. Montoya on-call who advises patient present to ED and refrain from taking antipyretics if she has not already done so. Informed patient who agrees with plan to present to ED but does report she took ibuprofen and tylenol this afternoon since taking the elevated temp. Encouraged pt to report this information including temp level, chills, body aches to ED when she arrives.    Patient expressed understanding and agrees with plan.

## 2019-01-05 NOTE — ED NOTES
Pt.  spoke with OB MD while waiting in lobby.  Pt. states feeling much better and OB MD thought it would be ok for pt. to go home.  Pt. states will follow up in UC or ED if symptoms worsen.

## 2019-01-05 NOTE — ED TRIAGE NOTES
Had baby 5 days ago.  Milk started coming in yesterday.  Became engorged this a.m.,  developed temp around 1400 this after noon.  Breast are hard and painful.  Does not have any reddness in breast area.

## 2019-01-10 ENCOUNTER — OFFICE VISIT (OUTPATIENT)
Dept: OBGYN | Facility: CLINIC | Age: 39
End: 2019-01-10
Attending: OBSTETRICS & GYNECOLOGY
Payer: COMMERCIAL

## 2019-01-10 ENCOUNTER — TELEPHONE (OUTPATIENT)
Dept: OBGYN | Facility: CLINIC | Age: 39
End: 2019-01-10

## 2019-01-10 VITALS
DIASTOLIC BLOOD PRESSURE: 69 MMHG | WEIGHT: 116 LBS | BODY MASS INDEX: 20.55 KG/M2 | SYSTOLIC BLOOD PRESSURE: 101 MMHG | HEART RATE: 99 BPM | TEMPERATURE: 97.9 F

## 2019-01-10 LAB
ALBUMIN UR-MCNC: 30 MG/DL
APPEARANCE UR: CLEAR
BACTERIA #/AREA URNS HPF: ABNORMAL /HPF
BASOPHILS # BLD AUTO: 0 10E9/L (ref 0–0.2)
BASOPHILS NFR BLD AUTO: 0.3 %
BILIRUB UR QL STRIP: NEGATIVE
COLOR UR AUTO: YELLOW
DIFFERENTIAL METHOD BLD: ABNORMAL
EOSINOPHIL # BLD AUTO: 0 10E9/L (ref 0–0.7)
EOSINOPHIL NFR BLD AUTO: 0.3 %
ERYTHROCYTE [DISTWIDTH] IN BLOOD BY AUTOMATED COUNT: 12.8 % (ref 10–15)
GLUCOSE UR STRIP-MCNC: NEGATIVE MG/DL
HCT VFR BLD AUTO: 31.9 % (ref 35–47)
HGB BLD-MCNC: 10.4 G/DL (ref 11.7–15.7)
HGB UR QL STRIP: ABNORMAL
IMM GRANULOCYTES # BLD: 0 10E9/L (ref 0–0.4)
IMM GRANULOCYTES NFR BLD: 1 %
KETONES UR STRIP-MCNC: NEGATIVE MG/DL
LEUKOCYTE ESTERASE UR QL STRIP: ABNORMAL
LYMPHOCYTES # BLD AUTO: 0.6 10E9/L (ref 0.8–5.3)
LYMPHOCYTES NFR BLD AUTO: 17.5 %
MCH RBC QN AUTO: 32.3 PG (ref 26.5–33)
MCHC RBC AUTO-ENTMCNC: 32.6 G/DL (ref 31.5–36.5)
MCV RBC AUTO: 99 FL (ref 78–100)
MONOCYTES # BLD AUTO: 0.6 10E9/L (ref 0–1.3)
MONOCYTES NFR BLD AUTO: 17.8 %
MUCOUS THREADS #/AREA URNS LPF: PRESENT /LPF
NEUTROPHILS # BLD AUTO: 2 10E9/L (ref 1.6–8.3)
NEUTROPHILS NFR BLD AUTO: 63.1 %
NITRATE UR QL: NEGATIVE
NRBC # BLD AUTO: 0 10*3/UL
NRBC BLD AUTO-RTO: 0 /100
PH UR STRIP: 6 PH (ref 5–7)
PLATELET # BLD AUTO: 213 10E9/L (ref 150–450)
RBC # BLD AUTO: 3.22 10E12/L (ref 3.8–5.2)
RBC #/AREA URNS AUTO: 5 /HPF (ref 0–2)
SOURCE: ABNORMAL
SP GR UR STRIP: 1.03 (ref 1–1.03)
SQUAMOUS #/AREA URNS AUTO: 1 /HPF (ref 0–1)
UROBILINOGEN UR STRIP-MCNC: NORMAL MG/DL (ref 0–2)
WBC # BLD AUTO: 3.1 10E9/L (ref 4–11)
WBC #/AREA URNS AUTO: 52 /HPF (ref 0–5)

## 2019-01-10 PROCEDURE — 36415 COLL VENOUS BLD VENIPUNCTURE: CPT | Performed by: OBSTETRICS & GYNECOLOGY

## 2019-01-10 PROCEDURE — 87086 URINE CULTURE/COLONY COUNT: CPT | Performed by: OBSTETRICS & GYNECOLOGY

## 2019-01-10 PROCEDURE — G0463 HOSPITAL OUTPT CLINIC VISIT: HCPCS | Mod: ZF

## 2019-01-10 PROCEDURE — 81001 URINALYSIS AUTO W/SCOPE: CPT | Performed by: OBSTETRICS & GYNECOLOGY

## 2019-01-10 PROCEDURE — 85025 COMPLETE CBC W/AUTO DIFF WBC: CPT | Performed by: OBSTETRICS & GYNECOLOGY

## 2019-01-10 RX ORDER — FLUCONAZOLE 150 MG/1
150 TABLET ORAL ONCE
Qty: 1 TABLET | Refills: 0 | Status: SHIPPED | OUTPATIENT
Start: 2019-01-10 | End: 2019-04-01

## 2019-01-10 ASSESSMENT — PAIN SCALES - GENERAL: PAINLEVEL: NO PAIN (0)

## 2019-01-10 NOTE — LETTER
1/10/2019       RE: Chelsea Stein  [address]  [facility] [address]     Dear Colleague,    Thank you for referring your patient, Chelsea Stein, to the [facility] at [facility]. Please see a copy of my visit note below.    39 yo with history of Lupus, now 12 days postpartum after  complicated by 3rd degree laceration and postpartum hemorrhage (QBL 1144cc) presents with recurrent fevers for last week. First called on Friday (6 days ago) with temp of 101.9F.  This was when her milk came in and thought related to engorgement.  She is nursing and pumping and feels that this is going well. No firm or red areas in breast.  She had mastitis after her first delivery and she does not feel like that at all.  Since Friday she continues to daily episodes of fever, chills and body aches.  She generally will feel wiped out and seems to come on suddenly when she otherwise feels how she expects to feel postpartum. She notes that vaginal bleeding is decreasing, maybe some mal-odor.  Night sweats occur which she attributes to breastfeeding.  Since the temp last Friday her fever episodes have been lower usually  100.0 to 100.3.  Today max 100.0F at noon when she took ibuprofen and tylenol.    She denies cough or shortness of breath.  No dysuria.  Bladder and bowel function have been normal.  She feels tender in the vulva but is able to sit and move about the house.     ROS: 10 point ROS neg other than the symptoms noted above in the HPI.    Past Medical History:   Diagnosis Date     Lupus (systemic lupus erythematosus) (H)     SSA+     Past Surgical History:   Procedure Laterality Date      SECTION  2014     Physical exam:  /69   Pulse 99   Temp 97.9  F (36.6  C) (Oral)   Wt 52.6 kg (116 lb)   LMP 2018   Breastfeeding? Yes   BMI 20.55 kg/m     Gen'l: appears tired, but not ill, no distress  HEENT: OP is clear, no nasal  drainage  Neck: no LAD  Breast: full, milk leaking from nipples, non-tender, no erythema, no masses  CV: RRR, no murmur  Lungs: CTA bilaterally  Abd: soft, fundus barely palpable, diastasis noted, mild suprapubic tenderness  Vulva: perineum intact, suture knot visible, but no breakdown of repaired laceration.  Mild edema in bilateral labia majora, appropriately tender  Urethra: normal  Vagina: healing as described above, intact repair, normal thin brown/red discharge, no mal-odor  Cervix: no speculum exam completed, non-tender to palpation, palpates intact  Uterus: mobile, 14 weeks in size, tenderness with palpation of fundus although not exquisitely so  Adnexa: no palpable masses, NT  Rectum: Anus normal, no rectovaginal exam done      Assessment/Plan  Post-partum fever, possible evolving endometritis  Breastfeeding  Postpartum day #12 s/p  c/b 3rd degree and PPH    - to lab for CBC, history of leukopenia (WBC 1.9, hgb 10.3, plt 104 on discharge)  - UA/UC sent  - recommend close follow-up and empiric therapy for endometritis with augmentin.  Cautioned regarding risk of vaginal yeast infection and infant thrush.  Follow-up next Tuesday or Wednesday.  - Patient cautioned to monitor symptoms.  OK to take ibuprofen and tylenol as needed, but call with temp 100.4 or greater or worsening pain, or other new symptoms.    Sammi Pascual MD

## 2019-01-10 NOTE — PROGRESS NOTES
37 yo with history of Lupus, now 12 days postpartum after  complicated by 3rd degree laceration and postpartum hemorrhage (QBL 1144cc) presents with recurrent fevers for last week. First called on Friday (6 days ago) with temp of 101.9F.  This was when her milk came in and thought related to engorgement.  She is nursing and pumping and feels that this is going well. No firm or red areas in breast.  She had mastitis after her first delivery and she does not feel like that at all.  Since Friday she continues to daily episodes of fever, chills and body aches.  She generally will feel wiped out and seems to come on suddenly when she otherwise feels how she expects to feel postpartum. She notes that vaginal bleeding is decreasing, maybe some mal-odor.  Night sweats occur which she attributes to breastfeeding.  Since the temp last Friday her fever episodes have been lower usually  100.0 to 100.3.  Today max 100.0F at noon when she took ibuprofen and tylenol.    She denies cough or shortness of breath.  No dysuria.  Bladder and bowel function have been normal.  She feels tender in the vulva but is able to sit and move about the house.     ROS: 10 point ROS neg other than the symptoms noted above in the HPI.    Past Medical History:   Diagnosis Date     Lupus (systemic lupus erythematosus) (H)     SSA+     Past Surgical History:   Procedure Laterality Date      SECTION  2014     Physical exam:  /69   Pulse 99   Temp 97.9  F (36.6  C) (Oral)   Wt 52.6 kg (116 lb)   LMP 2018   Breastfeeding? Yes   BMI 20.55 kg/m    Gen'l: appears tired, but not ill, no distress  HEENT: OP is clear, no nasal drainage  Neck: no LAD  Breast: full, milk leaking from nipples, non-tender, no erythema, no masses  CV: RRR, no murmur  Lungs: CTA bilaterally  Abd: soft, fundus barely palpable, diastasis noted, mild suprapubic tenderness  Vulva: perineum intact, suture knot visible, but no breakdown of repaired  laceration.  Mild edema in bilateral labia majora, appropriately tender  Urethra: normal  Vagina: healing as described above, intact repair, normal thin brown/red discharge, no mal-odor  Cervix: no speculum exam completed, non-tender to palpation, palpates intact  Uterus: mobile, 14 weeks in size, tenderness with palpation of fundus although not exquisitely so  Adnexa: no palpable masses, NT  Rectum: Anus normal, no rectovaginal exam done      Assessment/Plan  Post-partum fever, possible evolving endometritis  Breastfeeding  Postpartum day #12 s/p  c/b 3rd degree and PPH    - to lab for CBC, history of leukopenia (WBC 1.9, hgb 10.3, plt 104 on discharge)  - UA/UC sent  - recommend close follow-up and empiric therapy for endometritis with augmentin.  Cautioned regarding risk of vaginal yeast infection and infant thrush.  Follow-up next Tuesday or Wednesday.  - Patient cautioned to monitor symptoms.  OK to take ibuprofen and tylenol as needed, but call with temp 100.4 or greater or worsening pain, or other new symptoms.    Sammi Pascual MD

## 2019-01-10 NOTE — TELEPHONE ENCOUNTER
Patient called to report nightly low grade fever (100-101) which cause fatigue, body aches, chills, but resolve with tylenol and do not return until next evening. Pt is 11 days post partum vaginal delivery.     Pt denies any breast symptoms, per patient breastfeeding/pumping is well established. Denies pain, redness, tender/hardened areas on either breast.     Pt reports still experiencing swelling and tenderness of vagina/vulva and perineum but has not visualized the area. Denies abnormal discharge. Bleeding has decreased to changing a pad approximately 4 times daily, blood is pink to brown in coloration with occasional bright red blood. Denies uterine cramping. Does endorse foul odor from vagina.    Patient endorses she is hydrating and eating well and resting appropriately.     Advised patient to be seen in clinic for assessment. Patient agrees with plan and has no further questions at this time. Scheduled with Dr. Pascual today

## 2019-01-11 LAB
BACTERIA SPEC CULT: NORMAL
Lab: NORMAL
SPECIMEN SOURCE: NORMAL

## 2019-01-15 ENCOUNTER — OFFICE VISIT (OUTPATIENT)
Dept: OBGYN | Facility: CLINIC | Age: 39
End: 2019-01-15
Payer: COMMERCIAL

## 2019-01-15 VITALS
DIASTOLIC BLOOD PRESSURE: 64 MMHG | HEART RATE: 101 BPM | WEIGHT: 116 LBS | SYSTOLIC BLOOD PRESSURE: 87 MMHG | BODY MASS INDEX: 20.55 KG/M2

## 2019-01-15 PROBLEM — O60.00 PRETERM LABOR: Status: RESOLVED | Noted: 2018-12-14 | Resolved: 2019-01-15

## 2019-01-15 PROBLEM — Z34.90 PREGNANCY: Status: RESOLVED | Noted: 2018-12-31 | Resolved: 2019-01-15

## 2019-01-15 PROCEDURE — G0463 HOSPITAL OUTPT CLINIC VISIT: HCPCS | Mod: ZF

## 2019-01-15 ASSESSMENT — PAIN SCALES - GENERAL: PAINLEVEL: NO PAIN (0)

## 2019-01-15 ASSESSMENT — ANXIETY QUESTIONNAIRES
2. NOT BEING ABLE TO STOP OR CONTROL WORRYING: NOT AT ALL
5. BEING SO RESTLESS THAT IT IS HARD TO SIT STILL: NOT AT ALL
6. BECOMING EASILY ANNOYED OR IRRITABLE: NOT AT ALL
3. WORRYING TOO MUCH ABOUT DIFFERENT THINGS: NOT AT ALL
7. FEELING AFRAID AS IF SOMETHING AWFUL MIGHT HAPPEN: NOT AT ALL
GAD7 TOTAL SCORE: 0
1. FEELING NERVOUS, ANXIOUS, OR ON EDGE: NOT AT ALL

## 2019-01-15 ASSESSMENT — PATIENT HEALTH QUESTIONNAIRE - PHQ9
5. POOR APPETITE OR OVEREATING: NOT AT ALL
SUM OF ALL RESPONSES TO PHQ QUESTIONS 1-9: 1

## 2019-01-15 NOTE — PROGRESS NOTES
Postpartum Visit Note    S:  Ms. Chelsea Stein is a 38 year old  here for follow-up due to presumed endometritis.  She is s/p  on 18 complicated by a third degree laceration and postpartum hemorrhage.  She was seen in clinic in 1/10/19 after having fevers at home and was noted to have significant fundal tenderness.  She was prescribed augmentin, which she has been taking.  Did also taken diflucan for symptoms of yeast infection.  She notes feeling significantly improved since starting the antibiotic.  Denies fevers, chills, abdominal pain, nausea and vomiting.  Has a little spotting, no malodorous discharge.  Voiding spontaneously and taking stool softeners to have soft BMx.  Still has some perineal swelling and is doing regular sitz baths.  Breastfeeding, denies breast concerns.  Denies mood concerns.  Has been feeling a little emotional though.      ROS: 10 point ROS negative other than symptoms noted in HPI.     O:  BP (!) 87/64   Pulse 101   Wt 52.6 kg (116 lb)   LMP 2018   Breastfeeding? Yes   BMI 20.55 kg/m    Gen: Well-appearing, NAD  Psych:  negative for sleep disturbance, anxiety and depression  Abd:  Benign, soft, flat, non-tender, fundus well below umbilicus  Ext:  Warm, well-perfused, no edema     A/P:  Ms. Chelsea Stein is a 38 year old  s/p  on 18 complicated by third degree laceration and PPH.  PMHx notable for SLE.  Recently undergoing outpatient treatment for presumed endometritis, significantly improved clinically.     #) Presumed endometritis:   - Complete course of PO augmentin, patient clinically improved.  Continue to monitor for fevers and call if symptoms worsen.      #) Postpartum cares:   - Continue scheduled stool softeners x 6 weeks and sitz baths.  Will examine perineum at 6 week postpartum visit.   - Mood is stable, reviewed warning symptoms and resources available.  Patient denies concerns.   - Breastfeeding, no concerns.    - Will  discuss contraception at 6 week postpartum visit.      RTC for 6 week postpartum visit     Staffed with Dr. Jan Jimenez MD  Ob/Gyn, PGY-4  1/15/2019 1:47 PM    The Patient was seen in [facility] by BRIANNA JIMENEZ.  I reviewed the history & exam. Assessment and plan were jointly made.    Joanie Montoya MD

## 2019-01-16 ASSESSMENT — ANXIETY QUESTIONNAIRES: GAD7 TOTAL SCORE: 0

## 2019-02-08 ENCOUNTER — OFFICE VISIT (OUTPATIENT)
Dept: OBGYN | Facility: CLINIC | Age: 39
End: 2019-02-08
Attending: OBSTETRICS & GYNECOLOGY
Payer: COMMERCIAL

## 2019-02-08 VITALS
DIASTOLIC BLOOD PRESSURE: 73 MMHG | HEIGHT: 63 IN | SYSTOLIC BLOOD PRESSURE: 109 MMHG | BODY MASS INDEX: 20.41 KG/M2 | HEART RATE: 69 BPM | WEIGHT: 115.2 LBS

## 2019-02-08 DIAGNOSIS — N39.41 URGE INCONTINENCE OF URINE: ICD-10-CM

## 2019-02-08 LAB
ALBUMIN UR-MCNC: 10 MG/DL
APPEARANCE UR: CLEAR
BACTERIA #/AREA URNS HPF: ABNORMAL /HPF
BILIRUB UR QL STRIP: NEGATIVE
COLOR UR AUTO: YELLOW
GLUCOSE UR STRIP-MCNC: NEGATIVE MG/DL
HGB UR QL STRIP: ABNORMAL
KETONES UR STRIP-MCNC: NEGATIVE MG/DL
LEUKOCYTE ESTERASE UR QL STRIP: NEGATIVE
NITRATE UR QL: NEGATIVE
PH UR STRIP: 6 PH (ref 5–7)
RBC #/AREA URNS AUTO: <1 /HPF (ref 0–2)
SOURCE: ABNORMAL
SP GR UR STRIP: 1.01 (ref 1–1.03)
SQUAMOUS #/AREA URNS AUTO: <1 /HPF (ref 0–1)
TRANS CELLS #/AREA URNS HPF: <1 /HPF (ref 0–1)
UROBILINOGEN UR STRIP-MCNC: NORMAL MG/DL (ref 0–2)
WBC #/AREA URNS AUTO: 1 /HPF (ref 0–5)

## 2019-02-08 PROCEDURE — 81001 URINALYSIS AUTO W/SCOPE: CPT | Performed by: OBSTETRICS & GYNECOLOGY

## 2019-02-08 PROCEDURE — G0463 HOSPITAL OUTPT CLINIC VISIT: HCPCS

## 2019-02-08 ASSESSMENT — PATIENT HEALTH QUESTIONNAIRE - PHQ9
SUM OF ALL RESPONSES TO PHQ QUESTIONS 1-9: 0
5. POOR APPETITE OR OVEREATING: NOT AT ALL

## 2019-02-08 ASSESSMENT — ANXIETY QUESTIONNAIRES
5. BEING SO RESTLESS THAT IT IS HARD TO SIT STILL: NOT AT ALL
GAD7 TOTAL SCORE: 0
2. NOT BEING ABLE TO STOP OR CONTROL WORRYING: NOT AT ALL
3. WORRYING TOO MUCH ABOUT DIFFERENT THINGS: NOT AT ALL
6. BECOMING EASILY ANNOYED OR IRRITABLE: NOT AT ALL
1. FEELING NERVOUS, ANXIOUS, OR ON EDGE: NOT AT ALL
7. FEELING AFRAID AS IF SOMETHING AWFUL MIGHT HAPPEN: NOT AT ALL

## 2019-02-08 ASSESSMENT — MIFFLIN-ST. JEOR: SCORE: 1176.67

## 2019-02-08 NOTE — LETTER
"2019       RE: Chelsea Stein  [address]  [facility] [address]     Dear Colleague,    Thank you for referring your patient, Chelsea Stein, to the [facility] at [facility]. Please see a copy of my visit note below.    SUBJECTIVE   Chelsea Stein is a 38 year old  6 weeks postpartum from  delivery.  See delivery note and discharge summary for further details. Pregnancy complicated by history of prior CD (for placenta previa), SLE, leukopenia/thrombocytopenia, AMA. Delivery complicated by dystocia of posterior shoulder relieved by delivery of posterior arm, and uterine atony/PPH and partial third degree and bilateral periurethral lacerations.    She is feeling overall well today. She completed a 10-day course of Augmentin in January for presumed endometritis and reports that her symptoms have resolved, with no continuing drainage, discharge, malodor, or abdominal pain. Her perineum is still mildly tender but improving. She is still breastfeeding and reports no residual issues with engorgement or mastitis. She does have some mild urge incontinence with small-volume leakage, but feels that this is improving. She denies any dysuria or frequency. She has regular bowel movements without pain and is still taking stool softeners. Her mood is good. She is interested in a Mirena IUD but would like to defer placement because of conflicts with childcare today. She has not yet resumed intercourse. She did have three days of vaginal bleeding about ten days after her lochia stopped, which she thinks might have been a period.    Review of Systems   ROS: Focused ROS neg other than the symptoms noted above in the HPI.      OBJECTIVE   /73 (BP Location: Left arm, Patient Position: Chair)   Pulse 69   Ht 1.6 m (5' 3\")   Wt 52.3 kg (115 lb 3.2 oz)   LMP 2018   BMI 20.41 kg/m     BMI: Body mass index is 20.41 kg/m .  General:  " Alert, no distress   Head:  Normocephalic, without obvious abnormality   Lungs:  Clear to auscultation bilaterally   Heart:  Regular rate and rhythm, no murmur   Abdomen:  Soft, non-tender, non-distended, bowel sounds normal   Pelvic: -nefg  -perineum well-approximated with faint granulation tissue just inside introitus, mildly tender to palpation, silver nitrate applied  -bladder wnl, well supported  -vagina normal with scant dark brown blood present  -cervix normal in appearance, parous os with scant dark brown bleeding, no masses  -uterus 6 wk size, AV, mobile, NT  -no adnexal masses, NT  -anus wnl, digital exam deferred   Extremities:  Normal       ASSESSMENT   Chelsea Stein is a 38 year old , postpartum from .    PLAN     -- Return to fertility reviewed.  Patient plans Mirena IUD for contraception and will make an appointment in the near future to have this inserted. Advised condom use in the meantime.  -- Continue a multi vitamin supplement  -- Pap/HRHPV possibly needed prior to IUD as no paps on record (history of negatives, patient thinks last normal in ), will attempt to get records (signed release today) and repeat at time of IUD placement if necessary  -- UA with reflex culture ordered for urge incontinence, although counseled likely normal so close to delivery and will likely improve. Counseled on bladder training and pelvic floor exercises, encouraged to follow up if symptoms worsen or fail to resolve   -- Perineal laceration well-healed, silver nitrate applied to granulation tissue    RTC for IUD insertion at earliest convenience    Erika Sorensen MD  Ob/Gyn Resident, PGY-1  19 11:05 AM     [facility] Staff Note:  I examined Chelsea Stein on 2019 with Dr. Sorensen and agree with the presentation, exam and plan of care documented in this note with edits by me.     Ashanti Heart MD  2019

## 2019-02-08 NOTE — PROGRESS NOTES
"SUBJECTIVE   Chelsea Stein is a 38 year old  6 weeks postpartum from  delivery.  See delivery note and discharge summary for further details. Pregnancy complicated by history of prior CD (for placenta previa), SLE, leukopenia/thrombocytopenia, AMA. Delivery complicated by dystocia of posterior shoulder relieved by delivery of posterior arm, and uterine atony/PPH and partial third degree and bilateral periurethral lacerations.    She is feeling overall well today. She completed a 10-day course of Augmentin in January for presumed endometritis and reports that her symptoms have resolved, with no continuing drainage, discharge, malodor, or abdominal pain. Her perineum is still mildly tender but improving. She is still breastfeeding and reports no residual issues with engorgement or mastitis. She does have some mild urge incontinence with small-volume leakage, but feels that this is improving. She denies any dysuria or frequency. She has regular bowel movements without pain and is still taking stool softeners. Her mood is good. She is interested in a Mirena IUD but would like to defer placement because of conflicts with childcare today. She has not yet resumed intercourse. She did have three days of vaginal bleeding about ten days after her lochia stopped, which she thinks might have been a period.    Review of Systems   ROS: Focused ROS neg other than the symptoms noted above in the HPI.      OBJECTIVE   /73 (BP Location: Left arm, Patient Position: Chair)   Pulse 69   Ht 1.6 m (5' 3\")   Wt 52.3 kg (115 lb 3.2 oz)   LMP 2018   BMI 20.41 kg/m    BMI: Body mass index is 20.41 kg/m .  General:  Alert, no distress   Head:  Normocephalic, without obvious abnormality   Lungs:  Clear to auscultation bilaterally   Heart:  Regular rate and rhythm, no murmur   Abdomen:  Soft, non-tender, non-distended, bowel sounds normal   Pelvic: -nefg  -perineum well-approximated with faint granulation tissue " just inside introitus, mildly tender to palpation, silver nitrate applied  -bladder wnl, well supported  -vagina normal with scant dark brown blood present  -cervix normal in appearance, parous os with scant dark brown bleeding, no masses  -uterus 6 wk size, AV, mobile, NT  -no adnexal masses, NT  -anus wnl, digital exam deferred   Extremities:  Normal       ASSESSMENT   Chelsea Stein is a 38 year old , postpartum from .    PLAN     -- Return to fertility reviewed.  Patient plans Mirena IUD for contraception and will make an appointment in the near future to have this inserted. Advised condom use in the meantime.  -- Continue a multi vitamin supplement  -- Pap/HRHPV possibly needed prior to IUD as no paps on record (history of negatives, patient thinks last normal in ), will attempt to get records (signed release today) and repeat at time of IUD placement if necessary  -- UA with reflex culture ordered for urge incontinence, although counseled likely normal so close to delivery and will likely improve. Counseled on bladder training and pelvic floor exercises, encouraged to follow up if symptoms worsen or fail to resolve   -- Perineal laceration well-healed, silver nitrate applied to granulation tissue    RTC for IUD insertion at earliest convenience    Erika Sorensen MD  Ob/Gyn Resident, PGY-1  19 11:05 AM     [facility] Staff Note:  I examined Chelsea Stein on 2019 with Dr. Sorensen and agree with the presentation, exam and plan of care documented in this note with edits by me.   Ashanti Heart MD  2019

## 2019-02-08 NOTE — NURSING NOTE
SUBJECTIVE:   Chelsea Stein is here for her 6-week postpartum checkup.     PHQ-9 score: 0  Hx of Abuse:  No    Delivery Date: 2018.    Delivering provider:  Sammi Kam MD.    Type of delivery:  .  Perineum:  tear, with repair.     Delivery complications: None  Infant gender:  girl, weight 7 pounds 14.6 oz.  Feeding Method:  .  Complications reported with feeding:  none, infant thriving .    Bleeding:  None.  Duration:  4 weeks.  Menses resumed:  Yes   Bowel/Urinary problems:  Yes     Contraception Planned:  Will discuss  She  has not had intercourse since delivery..

## 2019-02-09 ASSESSMENT — ANXIETY QUESTIONNAIRES: GAD7 TOTAL SCORE: 0

## 2019-03-04 ENCOUNTER — OFFICE VISIT (OUTPATIENT)
Dept: RHEUMATOLOGY | Facility: CLINIC | Age: 39
End: 2019-03-04
Attending: INTERNAL MEDICINE
Payer: COMMERCIAL

## 2019-03-04 VITALS
DIASTOLIC BLOOD PRESSURE: 62 MMHG | HEART RATE: 83 BPM | SYSTOLIC BLOOD PRESSURE: 93 MMHG | BODY MASS INDEX: 20.2 KG/M2 | HEIGHT: 63 IN | WEIGHT: 114 LBS | OXYGEN SATURATION: 98 %

## 2019-03-04 DIAGNOSIS — M32.9 SYSTEMIC LUPUS ERYTHEMATOSUS, UNSPECIFIED SLE TYPE, UNSPECIFIED ORGAN INVOLVEMENT STATUS (H): Primary | ICD-10-CM

## 2019-03-04 DIAGNOSIS — Z79.899 ENCOUNTER FOR LONG-TERM (CURRENT) USE OF HIGH-RISK MEDICATION: Primary | ICD-10-CM

## 2019-03-04 PROCEDURE — G0463 HOSPITAL OUTPT CLINIC VISIT: HCPCS | Mod: ZF

## 2019-03-04 ASSESSMENT — MIFFLIN-ST. JEOR: SCORE: 1171.23

## 2019-03-04 ASSESSMENT — PAIN SCALES - GENERAL: PAINLEVEL: NO PAIN (0)

## 2019-03-04 NOTE — LETTER
3/4/2019      RE: Chelsea KOLB Sarita  [address]  [facility] [address]       Rheumatology Clinic - Fellow  Date: 19    Referral for: transfer of care, lupus, pregnat  Referred by: Dr. Scott Dorsey    Patient Summary: 37 yo F with SSA+ SLE who transfered her care from [facility] (Dr. Tiffany Parkinson) and [facility] (Dr. Conde) and was pregnant with 2nd child at initial visit (10/15/18). More recently she is now s/p 2 pregnancies without any lupus related complications. She was diagnosed at 20 yo initially with raynaud's then in her 20s had a full flare and was diagnosed with lupus. She has not required steroids since 2016 though her disease is very steroid responsive. C4 levels and dsDNA levels do correspond to flares per patient. Before she moved her persistent neutropenia was evaluated via BM biopsy which was unremarkable.    Interval history (3/8/19): Chelsea had a successful  complicated by 3rd degree laceration and postpartum hemorrhage, later by endometritis s/p augmentin and diflucan. She is now doing well and feeling tired, sometimes overwhelmed, and on very little sleep but well within norms of new baby. She has not had any raynaud's flares. She has a little pinkness to her cheeks and wonders if that could be return of rash. We discussed using sunscreen and amount of sun we get in [place]. She has her eye exam after this. No fevers since completing antibiotic course. Still taking plaquenil only.     ROS: A comprehensive ROS was done. Positives are per HPI.      Allergies   Allergen Reactions     Benadryl [Diphenhydramine] Other (See Comments)     Sedation and feel like dying     Seasonal Allergies      Other reaction(s): Other: See Comments  Congestion        Current Outpatient Medications   Medication     acetaminophen (TYLENOL) 325 MG tablet     Ferrous Sulfate (IRON SUPPLEMENT PO)     hydroxychloroquine (PLAQUENIL) 200 MG tablet     ibuprofen (ADVIL/MOTRIN) 200 MG capsule      "Prenatal Vit-Fe Fumarate-FA (PRENATAL MULTIVITAMIN PLUS IRON) 27-0.8 MG TABS per tablet     No current facility-administered medications for this visit.      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[place], is a psychologist,  is a neurosurgeon, has 2 children, nonsmoker, occasional drinker not at this time though, no drug use    Physical Exam:  BP 93/62   Pulse 83   Ht 1.6 m (5' 3\")   Wt 51.7 kg (114 lb)   LMP 2018   SpO2 98%   BMI 20.19 kg/m      General: NAD, very pleasant  HEENT: face symmetric, mild dry mucosa, lacrimal pool adequate  Lymph: no submandibular, cervical, supraclavicular LAD  CV: rrr, no mrg  Resp: CTAB  Abd: pregnant, nt, nd  MSK: no weakness, gait wnl, full active ROM of all joints without pain  Skin: no rashes, no malar rash, no petechia, no bruising  Psych: congruent mood and affect    Data reviewed in care everywhere and in EPIC    Assessment:  39 yo F with well controlled lupus on plaquenil s/p healthy second pregnancy. No recent flares, no steroid need, mild oral sicca symptoms.    Diagnosis:  1. SLE (MAURICIO+ 1:1280 speckled, Sm+, RNP+, SSA+, Chromatin Ab+, APS negative, low C4 which corresponds to flares as does dsDNA per patient, with raynaud's, arthralgias, malar rash, mild alopecia, oral ulcers, photosensitivity, leukopenia)  2. Sicca symptoms (dry mouth)  3. Drug monitoring: Plaquenil long term use    Plan:  1. Continue plaquenil 300 mg daily.   Plaquenil started at 18 yo   Last eye exam: 3/8/19  2. Disease Monitoring Labs today: C3, C4, dsDNA, U Pr/Cr    RTC in 3 months    Patient staffed with Dr. Eliot Cartagena MD, PhD  Rheumatology Fellow  Pager [phone]    I saw this patient with the Rheumatology Fellow. I agree with the findings and recommendations.    Malik Mccabe M.D.  Staff Rheumatologist, [facility]  Pager " [phone]        Latoya Cartagena MD

## 2019-03-04 NOTE — PROGRESS NOTES
Rheumatology Clinic - Fellow  Date: 19    Referral for: transfer of care, lupus, pregnat  Referred by: Dr. Scott Dorsey    Patient Summary: 39 yo F with SSA+ SLE who transfered her care from [facility] (Dr. Tiffany Parkinson) and [facility] (Dr. Conde) and was pregnant with 2nd child at initial visit (10/15/18). More recently she is now s/p 2 pregnancies without any lupus related complications. She was diagnosed at 20 yo initially with raynaud's then in her 20s had a full flare and was diagnosed with lupus. She has not required steroids since 2016 though her disease is very steroid responsive. C4 levels and dsDNA levels do correspond to flares per patient. Before she moved her persistent neutropenia was evaluated via BM biopsy which was unremarkable.    Interval history (3/8/19): Chelsea had a successful  complicated by 3rd degree laceration and postpartum hemorrhage, later by endometritis s/p augmentin and diflucan. She is now doing well and feeling tired, sometimes overwhelmed, and on very little sleep but well within norms of new baby. She has not had any raynaud's flares. She has a little pinkness to her cheeks and wonders if that could be return of rash. We discussed using sunscreen and amount of sun we get in [place]. She has her eye exam after this. No fevers since completing antibiotic course. Still taking plaquenil only.     ROS: A comprehensive ROS was done. Positives are per HPI.      Allergies   Allergen Reactions     Benadryl [Diphenhydramine] Other (See Comments)     Sedation and feel like dying     Seasonal Allergies      Other reaction(s): Other: See Comments  Congestion        Current Outpatient Medications   Medication     acetaminophen (TYLENOL) 325 MG tablet     Ferrous Sulfate (IRON SUPPLEMENT PO)     hydroxychloroquine (PLAQUENIL) 200 MG tablet     ibuprofen (ADVIL/MOTRIN) 200 MG capsule     Prenatal Vit-Fe Fumarate-FA (PRENATAL MULTIVITAMIN PLUS IRON) 27-0.8 MG TABS per  "tablet     No current facility-administered medications for this visit.      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[place], is a psychologist,  is a neurosurgeon, has 2 children, nonsmoker, occasional drinker not at this time though, no drug use    Physical Exam:  BP 93/62   Pulse 83   Ht 1.6 m (5' 3\")   Wt 51.7 kg (114 lb)   LMP 2018   SpO2 98%   BMI 20.19 kg/m     General: NAD, very pleasant  HEENT: face symmetric, mild dry mucosa, lacrimal pool adequate  Lymph: no submandibular, cervical, supraclavicular LAD  CV: rrr, no mrg  Resp: CTAB  Abd: pregnant, nt, nd  MSK: no weakness, gait wnl, full active ROM of all joints without pain  Skin: no rashes, no malar rash, no petechia, no bruising  Psych: congruent mood and affect    Data reviewed in care everywhere and in EPIC    Assessment:  39 yo F with well controlled lupus on plaquenil s/p healthy second pregnancy. No recent flares, no steroid need, mild oral sicca symptoms.    Diagnosis:  1. SLE (MAURICIO+ 1:1280 speckled, Sm+, RNP+, SSA+, Chromatin Ab+, APS negative, low C4 which corresponds to flares as does dsDNA per patient, with raynaud's, arthralgias, malar rash, mild alopecia, oral ulcers, photosensitivity, leukopenia)  2. Sicca symptoms (dry mouth)  3. Drug monitoring: Plaquenil long term use    Plan:  1. Continue plaquenil 300 mg daily.   Plaquenil started at 20 yo   Last eye exam: 3/8/19  2. Disease Monitoring Labs today: C3, C4, dsDNA, U Pr/Cr    RTC in 3 months    Patient staffed with Dr. Eliot Cartagena MD, PhD  Rheumatology Fellow  Pager [phone]    I saw this patient with the Rheumatology Fellow. I agree with the findings and recommendations.    Malik Mccabe M.D.  Staff Rheumatologist, [facility]  Pager [phone]      "

## 2019-03-04 NOTE — PATIENT INSTRUCTIONS
Very nice to see you and the baby!  Labs today!  Continue plaquenil  Happy about the optho exam.  Don't forget UV protection in [place]    See you in 3 months!!

## 2019-03-04 NOTE — LETTER
3/4/2019       RE: Chelsea Stein  [address]  [place] [address]     Dear Colleague,    Thank you for referring your patient, Chelsea Stein, to the [facility] RHEUMATOLOGY at [facility]. Please see a copy of my visit note below.    Rheumatology Clinic - Fellow  Date: 19    Referral for: transfer of care, lupus, pregnat  Referred by: Dr. Scott Dorsey    Patient Summary: 37 yo F with SSA+ SLE who transfered her care from [facility] (Dr. Tiffany Parkinson) and [facility] (Dr. Conde) and was pregnant with 2nd child at initial visit (10/15/18). More recently she is now s/p 2 pregnancies without any lupus related complications. She was diagnosed at 18 yo initially with raynaud's then in her 20s had a full flare and was diagnosed with lupus. She has not required steroids since 2016 though her disease is very steroid responsive. C4 levels and dsDNA levels do correspond to flares per patient. Before she moved her persistent neutropenia was evaluated via BM biopsy which was unremarkable.    Interval history (3/8/19): Chelsea had a successful  complicated by 3rd degree laceration and postpartum hemorrhage, later by endometritis s/p augmentin and diflucan. She is now doing well and feeling tired, sometimes overwhelmed, and on very little sleep but well within norms of new baby. She has not had any raynaud's flares. She has a little pinkness to her cheeks and wonders if that could be return of rash. We discussed using sunscreen and amount of sun we get in [place]. She has her eye exam after this. No fevers since completing antibiotic course. Still taking plaquenil only.     ROS: A comprehensive ROS was done. Positives are per HPI.      Allergies   Allergen Reactions     Benadryl [Diphenhydramine] Other (See Comments)     Sedation and feel like dying     Seasonal Allergies      Other reaction(s): Other: See Comments  Congestion        Current Outpatient  "Medications   Medication     acetaminophen (TYLENOL) 325 MG tablet     Ferrous Sulfate (IRON SUPPLEMENT PO)     hydroxychloroquine (PLAQUENIL) 200 MG tablet     ibuprofen (ADVIL/MOTRIN) 200 MG capsule     Prenatal Vit-Fe Fumarate-FA (PRENATAL MULTIVITAMIN PLUS IRON) 27-0.8 MG TABS per tablet     No current facility-administered medications for this visit.      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[place], is a psychologist,  is a neurosurgeon, has 2 children, nonsmoker, occasional drinker not at this time though, no drug use    Physical Exam:  BP 93/62   Pulse 83   Ht 1.6 m (5' 3\")   Wt 51.7 kg (114 lb)   LMP 2018   SpO2 98%   BMI 20.19 kg/m      General: NAD, very pleasant  HEENT: face symmetric, mild dry mucosa, lacrimal pool adequate  Lymph: no submandibular, cervical, supraclavicular LAD  CV: rrr, no mrg  Resp: CTAB  Abd: pregnant, nt, nd  MSK: no weakness, gait wnl, full active ROM of all joints without pain  Skin: no rashes, no malar rash, no petechia, no bruising  Psych: congruent mood and affect    Data reviewed in care everywhere and in EPIC    Assessment:  39 yo F with well controlled lupus on plaquenil s/p healthy second pregnancy. No recent flares, no steroid need, mild oral sicca symptoms.    Diagnosis:  1. SLE (MAURICIO+ 1:1280 speckled, Sm+, RNP+, SSA+, Chromatin Ab+, APS negative, low C4 which corresponds to flares as does dsDNA per patient, with raynaud's, arthralgias, malar rash, mild alopecia, oral ulcers, photosensitivity, leukopenia)  2. Sicca symptoms (dry mouth)  3. Drug monitoring: Plaquenil long term use    Plan:  1. Continue plaquenil 300 mg daily.   Plaquenil started at 20 yo   Last eye exam: 3/8/19  2. Disease Monitoring Labs today: C3, C4, dsDNA, U Pr/Cr    RTC in 3 months    Patient staffed with Dr. Mccabe    I saw this patient with " the Rheumatology Fellow. I agree with the findings and recommendations.    Malik Mccabe M.D.  Staff Rheumatologist, [facility]  Pager [phone]    Latoya Cartagena MD, PhD  Rheumatology Fellow  Pager [phone]

## 2019-04-01 ENCOUNTER — OFFICE VISIT (OUTPATIENT)
Dept: FAMILY MEDICINE | Facility: CLINIC | Age: 39
End: 2019-04-01
Attending: FAMILY MEDICINE
Payer: COMMERCIAL

## 2019-04-01 VITALS
HEART RATE: 83 BPM | BODY MASS INDEX: 19.6 KG/M2 | TEMPERATURE: 97.5 F | SYSTOLIC BLOOD PRESSURE: 92 MMHG | HEIGHT: 63 IN | WEIGHT: 110.6 LBS | DIASTOLIC BLOOD PRESSURE: 61 MMHG

## 2019-04-01 DIAGNOSIS — M32.9 SYSTEMIC LUPUS ERYTHEMATOSUS, UNSPECIFIED SLE TYPE, UNSPECIFIED ORGAN INVOLVEMENT STATUS (H): ICD-10-CM

## 2019-04-01 DIAGNOSIS — J06.9 VIRAL UPPER RESPIRATORY TRACT INFECTION: ICD-10-CM

## 2019-04-01 DIAGNOSIS — N89.8 VAGINAL DISCHARGE: ICD-10-CM

## 2019-04-01 DIAGNOSIS — B37.31 CANDIDIASIS OF VAGINA: Primary | ICD-10-CM

## 2019-04-01 DIAGNOSIS — H61.22 IMPACTED CERUMEN OF LEFT EAR: ICD-10-CM

## 2019-04-01 LAB
CREAT UR-MCNC: 50 MG/DL
PROT UR-MCNC: 0.16 G/L
PROT/CREAT 24H UR: 0.32 G/G CR (ref 0–0.2)

## 2019-04-01 PROCEDURE — 84156 ASSAY OF PROTEIN URINE: CPT | Performed by: STUDENT IN AN ORGANIZED HEALTH CARE EDUCATION/TRAINING PROGRAM

## 2019-04-01 PROCEDURE — 36415 COLL VENOUS BLD VENIPUNCTURE: CPT | Performed by: STUDENT IN AN ORGANIZED HEALTH CARE EDUCATION/TRAINING PROGRAM

## 2019-04-01 PROCEDURE — 86225 DNA ANTIBODY NATIVE: CPT | Performed by: STUDENT IN AN ORGANIZED HEALTH CARE EDUCATION/TRAINING PROGRAM

## 2019-04-01 PROCEDURE — G0463 HOSPITAL OUTPT CLINIC VISIT: HCPCS | Mod: ZF

## 2019-04-01 PROCEDURE — 86160 COMPLEMENT ANTIGEN: CPT | Performed by: STUDENT IN AN ORGANIZED HEALTH CARE EDUCATION/TRAINING PROGRAM

## 2019-04-01 RX ORDER — FLUCONAZOLE 150 MG/1
150 TABLET ORAL ONCE
Qty: 1 TABLET | Refills: 0 | Status: SHIPPED | OUTPATIENT
Start: 2019-04-01 | End: 2019-05-28

## 2019-04-01 ASSESSMENT — PAIN SCALES - GENERAL: PAINLEVEL: NO PAIN (0)

## 2019-04-01 ASSESSMENT — MIFFLIN-ST. JEOR: SCORE: 1155.81

## 2019-04-01 NOTE — LETTER
4/1/2019       RE: Chelsea Stein  [address]  [facility] [address]     Dear Colleague,    Thank you for referring your patient, Chelsea Stein, to the [facility] at [facility]. Please see a copy of my visit note below.          Primary Care Center  New Patient Evaluation    Name: Chelsea Stein MRN: [id number]       Age: 38 year old           Chief Complaint:    YOB: 1980       CC:ear pain, vaginal itching, nasal congestion           HPI and ROS:   HPI:  This is a 38-year-old female with past medical history of lupus who comes in today for several reasons.  Her left ear feels plugged.  She thinks it might be wax.  This is going on a few weeks.  She has not had a fever or pain.    She also has a question of a yeast infection.  She has had itching and some mild discharge.  The discharge is light yellow.  Has been going on for 2 days.  She has not tried any over-the-counter meds.      She feels like she has a lingering cold.  Her throat has  been hurting on and off.  She has been congested.  Possibly had a fever last week.  She does have a 5-year-old in pre-k who is also been sick.  No cough, no face pain, she has green to yellow nasal congestion.  She has not tried medications over-the-counter.        ROS:  Review Of Systems  Skin: negative  Eyes: negative  Ears/Nose/Throat: plugged left ear, mild sore throat  Respiratory: No shortness of breath, dyspnea on exertion, cough, or hemoptysis  Cardiovascular: negative  Gastrointestinal: negative  Genitourinary: vaginal discharge  Musculoskeletal: negative  Neurologic: negative  Psychiatric: negative  Hematologic/Lymphatic/Immunologic: negative  Endocrine: negative         Past Medical History:     Past Medical History:   Diagnosis Date     Lupus (systemic lupus erythematosus) (H)     SSA+             Past Surgical History:      Past Surgical History:   Procedure Laterality Date       SECTION  2014             Social History:     Social History     Socioeconomic History     Marital status:      Spouse name: Not on file     Number of children: Not on file     Years of education: Not on file     Highest education level: Not on file   Occupational History     Not on file   Social Needs     Financial resource strain: Not on file     Food insecurity:     Worry: Not on file     Inability: Not on file     Transportation needs:     Medical: Not on file     Non-medical: Not on file   Tobacco Use     Smoking status: Never Smoker     Smokeless tobacco: Never Used   Substance and Sexual Activity     Alcohol use: No     Drug use: No     Sexual activity: Yes     Partners: Male     Birth control/protection: None   Lifestyle     Physical activity:     Days per week: Not on file     Minutes per session: Not on file     Stress: Not on file   Relationships     Social connections:     Talks on phone: Not on file     Gets together: Not on file     Attends Restorationism service: Not on file     Active member of club or organization: Not on file     Attends meetings of clubs or organizations: Not on file     Relationship status: Not on file     Intimate partner violence:     Fear of current or ex partner: Not on file     Emotionally abused: Not on file     Physically abused: Not on file     Forced sexual activity: Not on file   Other Topics Concern     Not on file   Social History Narrative     Not on file                Family History:   No family history on file.              Immunizations:     Immunization History   Administered Date(s) Administered     Influenza Vaccine IM 3yrs+ 4 Valent IIV4 10/15/2018     TDAP Vaccine (Boostrix) 10/30/2018             Allergies:     Allergies   Allergen Reactions     Benadryl [Diphenhydramine] Other (See Comments)     Sedation and feel like dying     Seasonal Allergies      Other reaction(s): Other: See Comments  Congestion             Medications:     Current  "Outpatient Medications on File Prior to Visit:  Ferrous Sulfate (IRON SUPPLEMENT PO) Take 65 mg by mouth daily   hydroxychloroquine (PLAQUENIL) 200 MG tablet 300 mg/day (1.5 pills) Annual Plaquenil toxicity eye screening required.   Prenatal Vit-Fe Fumarate-FA (PRENATAL MULTIVITAMIN PLUS IRON) 27-0.8 MG TABS per tablet Take 1 tablet by mouth daily   acetaminophen (TYLENOL) 325 MG tablet Take 650 mg by mouth every 4 hours as needed for mild pain     No current facility-administered medications on file prior to visit.          Exam:   BP 92/61   Pulse 83   Temp 97.5  F (36.4  C) (Oral)   Ht 1.6 m (5' 3\")   Wt 50.2 kg (110 lb 9.6 oz)   BMI 19.59 kg/m       General: NAD, alert and conversational  HEENT: left ear plugged with wax, right ear clear, no posterior drainage, no redness, no neck nodes, no facial tenderness   CV: Normal S1,S2 with RRR no murmurs, rubs or gallops  Resp: Lungs CTA bilaterally with no wheezes or crackles  MSK: no joint or bony abnormalities, normal muscle bulk  Pelvic exam: normal vagina and vulva, vaginal discharge described as creamy and scant, normal cervix without lesions or tenderness, Neuro: Grossly nonfocal, CNII-XII intact bilaterally  Skin: No concerning lesions or rashes        Labs:   Microscopic wet-mount exam shows monilia and normal epithelial cells. Negative whiff test, pH was 5.5          Assessment and Plan:   ASSESSMENT: This is a 38-year-old female who comes in today with feeling of plugged left ear filled with wax, vaginal itching positive for monilia, and mild upper respiratory symptoms.    (B37.3) Candidiasis of vagina  (primary encounter diagnosis)  Comment: on wet prep monilia was seen  Plan: fluconazole (DIFLUCAN) 150 MG tablet          (H61.22) Impacted cerumen of left ear  Comment: tried to lavage but pt not tolerating it  Plan: Debrox for 2-3 days then try water pic at home     (J06.9) Viral upper respiratory tract infection  Comment: likely viral - no acute " symptoms  Plan: conservative treatment     (N89.8) Vaginal discharge  Comment: +KOH on wet prep and symptomatic   Plan: Wet Prep POCT        As above - treat with jessica Carter MD, PhD  RTC: prn

## 2019-04-01 NOTE — PROGRESS NOTES
Primary Care Center  New Patient Evaluation    Name: Chelsea Stein MRN: [id number]       Age: 38 year old           Chief Complaint:    YOB: 1980       CC:ear pain, vaginal itching, nasal congestion           HPI and ROS:   HPI:  This is a 38-year-old female with past medical history of lupus who comes in today for several reasons.  Her left ear feels plugged.  She thinks it might be wax.  This is going on a few weeks.  She has not had a fever or pain.    She also has a question of a yeast infection.  She has had itching and some mild discharge.  The discharge is light yellow.  Has been going on for 2 days.  She has not tried any over-the-counter meds.      She feels like she has a lingering cold.  Her throat has  been hurting on and off.  She has been congested.  Possibly had a fever last week.  She does have a 5-year-old in pre-k who is also been sick.  No cough, no face pain, she has green to yellow nasal congestion.  She has not tried medications over-the-counter.        ROS:  Review Of Systems  Skin: negative  Eyes: negative  Ears/Nose/Throat: plugged left ear, mild sore throat  Respiratory: No shortness of breath, dyspnea on exertion, cough, or hemoptysis  Cardiovascular: negative  Gastrointestinal: negative  Genitourinary: vaginal discharge  Musculoskeletal: negative  Neurologic: negative  Psychiatric: negative  Hematologic/Lymphatic/Immunologic: negative  Endocrine: negative         Past Medical History:     Past Medical History:   Diagnosis Date     Lupus (systemic lupus erythematosus) (H)     SSA+             Past Surgical History:      Past Surgical History:   Procedure Laterality Date      SECTION  2014             Social History:     Social History     Socioeconomic History     Marital status:      Spouse name: Not on file     Number of children: Not on file     Years of education: Not on file     Highest education level: Not on file   Occupational  History     Not on file   Social Needs     Financial resource strain: Not on file     Food insecurity:     Worry: Not on file     Inability: Not on file     Transportation needs:     Medical: Not on file     Non-medical: Not on file   Tobacco Use     Smoking status: Never Smoker     Smokeless tobacco: Never Used   Substance and Sexual Activity     Alcohol use: No     Drug use: No     Sexual activity: Yes     Partners: Male     Birth control/protection: None   Lifestyle     Physical activity:     Days per week: Not on file     Minutes per session: Not on file     Stress: Not on file   Relationships     Social connections:     Talks on phone: Not on file     Gets together: Not on file     Attends Yazdanism service: Not on file     Active member of club or organization: Not on file     Attends meetings of clubs or organizations: Not on file     Relationship status: Not on file     Intimate partner violence:     Fear of current or ex partner: Not on file     Emotionally abused: Not on file     Physically abused: Not on file     Forced sexual activity: Not on file   Other Topics Concern     Not on file   Social History Narrative     Not on file                Family History:   No family history on file.              Immunizations:     Immunization History   Administered Date(s) Administered     Influenza Vaccine IM 3yrs+ 4 Valent IIV4 10/15/2018     TDAP Vaccine (Boostrix) 10/30/2018             Allergies:     Allergies   Allergen Reactions     Benadryl [Diphenhydramine] Other (See Comments)     Sedation and feel like dying     Seasonal Allergies      Other reaction(s): Other: See Comments  Congestion             Medications:     Current Outpatient Medications on File Prior to Visit:  Ferrous Sulfate (IRON SUPPLEMENT PO) Take 65 mg by mouth daily   hydroxychloroquine (PLAQUENIL) 200 MG tablet 300 mg/day (1.5 pills) Annual Plaquenil toxicity eye screening required.   Prenatal Vit-Fe Fumarate-FA (PRENATAL MULTIVITAMIN PLUS  "IRON) 27-0.8 MG TABS per tablet Take 1 tablet by mouth daily   acetaminophen (TYLENOL) 325 MG tablet Take 650 mg by mouth every 4 hours as needed for mild pain     No current facility-administered medications on file prior to visit.          Exam:   BP 92/61   Pulse 83   Temp 97.5  F (36.4  C) (Oral)   Ht 1.6 m (5' 3\")   Wt 50.2 kg (110 lb 9.6 oz)   BMI 19.59 kg/m      General: NAD, alert and conversational  HEENT: left ear plugged with wax, right ear clear, no posterior drainage, no redness, no neck nodes, no facial tenderness   CV: Normal S1,S2 with RRR no murmurs, rubs or gallops  Resp: Lungs CTA bilaterally with no wheezes or crackles  MSK: no joint or bony abnormalities, normal muscle bulk  Pelvic exam: normal vagina and vulva, vaginal discharge described as creamy and scant, normal cervix without lesions or tenderness, Neuro: Grossly nonfocal, CNII-XII intact bilaterally  Skin: No concerning lesions or rashes        Labs:   Microscopic wet-mount exam shows monilia and normal epithelial cells. Negative whiff test, pH was 5.5          Assessment and Plan:   ASSESSMENT: This is a 38-year-old female who comes in today with feeling of plugged left ear filled with wax, vaginal itching positive for monilia, and mild upper respiratory symptoms.    (B37.3) Candidiasis of vagina  (primary encounter diagnosis)  Comment: on wet prep monilia was seen  Plan: fluconazole (DIFLUCAN) 150 MG tablet          (H61.22) Impacted cerumen of left ear  Comment: tried to lavage but pt not tolerating it  Plan: Debrox for 2-3 days then try water pic at home     (J06.9) Viral upper respiratory tract infection  Comment: likely viral - no acute symptoms  Plan: conservative treatment     (N89.8) Vaginal discharge  Comment: +KOH on wet prep and symptomatic   Plan: Wet Prep POCT        As above - treat with diflucan       Kaylan Carter MD, PhD                  RTC: prn          "

## 2019-04-01 NOTE — PATIENT INSTRUCTIONS
Use debrox left ear 2x/day for 3-4 days then use a water pic to get the wax out  For yeast take diflucan 1x dose  Respiratory symptoms are likely viral and treat symptomatically

## 2019-04-02 LAB — DSDNA AB SER-ACNC: 3 IU/ML

## 2019-04-03 LAB
C3 SERPL-MCNC: 89 MG/DL (ref 76–169)
C4 SERPL-MCNC: 23 MG/DL (ref 15–50)

## 2019-05-07 ENCOUNTER — OFFICE VISIT (OUTPATIENT)
Dept: OPHTHALMOLOGY | Facility: CLINIC | Age: 39
End: 2019-05-07
Attending: OPHTHALMOLOGY
Payer: COMMERCIAL

## 2019-05-07 DIAGNOSIS — Z79.899 ENCOUNTER FOR LONG-TERM (CURRENT) USE OF HIGH-RISK MEDICATION: ICD-10-CM

## 2019-05-07 PROCEDURE — 92250 FUNDUS PHOTOGRAPHY W/I&R: CPT | Mod: ZF | Performed by: OPHTHALMOLOGY

## 2019-05-07 PROCEDURE — G0463 HOSPITAL OUTPT CLINIC VISIT: HCPCS | Mod: ZF

## 2019-05-07 PROCEDURE — 92082 INTERMEDIATE VISUAL FIELD XM: CPT | Mod: ZF | Performed by: OPHTHALMOLOGY

## 2019-05-07 PROCEDURE — 92015 DETERMINE REFRACTIVE STATE: CPT | Mod: ZF

## 2019-05-07 PROCEDURE — 92134 CPTRZ OPH DX IMG PST SGM RTA: CPT | Mod: ZF | Performed by: OPHTHALMOLOGY

## 2019-05-07 ASSESSMENT — TONOMETRY
OD_IOP_MMHG: 20
OS_IOP_MMHG: 23
IOP_METHOD: TONOPEN

## 2019-05-07 ASSESSMENT — CONF VISUAL FIELD
METHOD: COUNTING FINGERS
OS_NORMAL: 1
OD_NORMAL: 1

## 2019-05-07 ASSESSMENT — REFRACTION_MANIFEST
OD_SPHERE: -1.25
OS_AXIS: 118
OD_CYLINDER: +1.25
OD_AXIS: 065
OS_SPHERE: -1.00
OS_CYLINDER: +1.00

## 2019-05-07 ASSESSMENT — CUP TO DISC RATIO
OD_RATIO: 0.1
OS_RATIO: 0.1

## 2019-05-07 ASSESSMENT — VISUAL ACUITY
OD_SC: 20/40
OD_PH_SC: 20/20
OS_SC+: -
OS_SC: 20/20
OD_SC+: +
METHOD: SNELLEN - LINEAR

## 2019-05-07 ASSESSMENT — EXTERNAL EXAM - LEFT EYE: OS_EXAM: NORMAL

## 2019-05-07 ASSESSMENT — SLIT LAMP EXAM - LIDS
COMMENTS: NORMAL
COMMENTS: NORMAL

## 2019-05-07 ASSESSMENT — EXTERNAL EXAM - RIGHT EYE: OD_EXAM: NORMAL

## 2019-05-07 NOTE — NURSING NOTE
Chief Complaints and History of Present Illnesses   Patient presents with     Monitor Current High Risk Meds     Chief Complaint(s) and History of Present Illness(es)     Monitor Current High Risk Meds     Laterality: both eyes    Associated symptoms: Negative for dryness, eye pain, redness and tearing              Comments     Patient states that she has been at her current Plaquenil (300mg) dose for the past year.    She had been taking the medication at 400mg for the 6 years prior and on and off for the prior 10 years before the 400mg dose.  She states that her vision has seemed stable in both eyes for the past year.    Rosanna Connors COT 9:51 AM May 7, 2019

## 2019-05-07 NOTE — PROGRESS NOTES
CC -   PLAQUENIL     INTERVAL HISTORY - Initial visit with me    HPI -   Chelsea Stein is a  39 year old year-old patient referred by Dr Westfall for evaluation and treat of plaquenil use for lupus. 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 Plaquenil use   - 400 mg/day 2207-5325, 300 mg/day since 2018   - no renal, 108#      - no likely toxixicty   - few missed spots on OVF 10-2   - plan mfERG (may have had done in [place]) in 1 year   - recheck 1 year     - dosage is slightly higher than 5 mg/kg, consider reducing to 200 mg/day if tolerated      2. Vitreous syneresis OU   - S/Sx RD d/w patient 5/2019        return to clinic: 1 year, OCT/FAF/OVF 10-2    ATTESTATION     Attending Physician Attestation:      Complete documentation of historical and exam elements from today's encounter can be found in the full encounter summary report (not reduplicated in this progress note).  I personally obtained the chief complaint(s) and history of present illness.  I confirmed and edited as necessary the review of systems, past medical/surgical history, family history, social history, and examination findings as documented by others; and I examined the patient myself.  I personally reviewed the relevant tests, images, and reports as documented above.  I formulated and edited as necessary the assessment and plan and discussed the findings and  management plan with the patient and family    Clare Montano MD, PhD  , Vitreoretinal Surgery  Department of Ophthalmology  [facility]

## 2019-05-28 ENCOUNTER — OFFICE VISIT (OUTPATIENT)
Dept: URGENT CARE | Facility: URGENT CARE | Age: 39
End: 2019-05-28
Payer: COMMERCIAL

## 2019-05-28 VITALS
DIASTOLIC BLOOD PRESSURE: 61 MMHG | OXYGEN SATURATION: 99 % | HEART RATE: 89 BPM | BODY MASS INDEX: 18.6 KG/M2 | SYSTOLIC BLOOD PRESSURE: 87 MMHG | TEMPERATURE: 98.3 F | WEIGHT: 105 LBS

## 2019-05-28 DIAGNOSIS — J06.9 VIRAL URI: Primary | ICD-10-CM

## 2019-05-28 DIAGNOSIS — J20.9 ACUTE BRONCHITIS, UNSPECIFIED ORGANISM: ICD-10-CM

## 2019-05-28 PROCEDURE — 99214 OFFICE O/P EST MOD 30 MIN: CPT | Performed by: FAMILY MEDICINE

## 2019-05-28 NOTE — PROGRESS NOTES
SUBJECTIVE: Chelsea Stein is a 39 year old female presenting with a chief complaint of nasal congestion, cough  and sore throat.  Onset of symptoms was 4-5 day(s) ago.  Course of illness is same.    Severity moderate  Current and Associated symptoms: runny nose, stuffy nose and cough - non-productive  Treatment measures tried include OTC.  Predisposing factors include nursing.    Past Medical History:   Diagnosis Date     Lupus (systemic lupus erythematosus) (H)     SSA+     Allergies   Allergen Reactions     Benadryl [Diphenhydramine] Other (See Comments)     Sedation and feel like dying     Seasonal Allergies      Other reaction(s): Other: See Comments  Congestion     Social History     Tobacco Use     Smoking status: Never Smoker     Smokeless tobacco: Never Used   Substance Use Topics     Alcohol use: No       ROS:  SKIN: no rash  GI: no vomiting    OBJECTIVE:  BP (!) 87/61   Pulse 89   Temp 98.3  F (36.8  C) (Oral)   Wt 47.6 kg (105 lb)   SpO2 99%   BMI 18.60 kg/m  GENERAL APPEARANCE: healthy, alert and no distress  EYES: EOMI,  PERRL, conjunctiva clear  HENT: ear canals and TM's normal.  Nose and mouth without ulcers, erythema or lesions  NECK: supple, nontender, no lymphadenopathy  RESP: lungs clear to auscultation - no rales, rhonchi or wheezes  SKIN: no suspicious lesions or rashes      ICD-10-CM    1. Viral URI J06.9    2. Acute bronchitis, unspecified organism J20.9      OTC meds safe for nursing  Fluids/Rest, f/u if worse/not any better

## 2019-06-10 ENCOUNTER — OFFICE VISIT (OUTPATIENT)
Dept: RHEUMATOLOGY | Facility: CLINIC | Age: 39
End: 2019-06-10
Attending: INTERNAL MEDICINE
Payer: COMMERCIAL

## 2019-06-10 VITALS
OXYGEN SATURATION: 98 % | BODY MASS INDEX: 18.33 KG/M2 | SYSTOLIC BLOOD PRESSURE: 90 MMHG | WEIGHT: 103.5 LBS | HEART RATE: 77 BPM | DIASTOLIC BLOOD PRESSURE: 60 MMHG

## 2019-06-10 DIAGNOSIS — M32.9 SYSTEMIC LUPUS ERYTHEMATOSUS, UNSPECIFIED SLE TYPE, UNSPECIFIED ORGAN INVOLVEMENT STATUS (H): Primary | ICD-10-CM

## 2019-06-10 DIAGNOSIS — M32.9 SYSTEMIC LUPUS COMPLICATING PREGNANCY (H): ICD-10-CM

## 2019-06-10 DIAGNOSIS — O99.891 SYSTEMIC LUPUS COMPLICATING PREGNANCY (H): ICD-10-CM

## 2019-06-10 PROCEDURE — G0463 HOSPITAL OUTPT CLINIC VISIT: HCPCS | Mod: ZF

## 2019-06-10 RX ORDER — HYDROXYCHLOROQUINE SULFATE 200 MG/1
200 TABLET, FILM COATED ORAL DAILY
Qty: 130 TABLET | Refills: 3 | Status: SHIPPED | OUTPATIENT
Start: 2019-06-10 | End: 2020-06-30

## 2019-06-10 ASSESSMENT — PAIN SCALES - GENERAL: PAINLEVEL: NO PAIN (0)

## 2019-06-10 NOTE — LETTER
"6/10/2019       RE: Chelsea Stein  [address]  [facility] [address]     Dear Colleague,    Thank you for referring your patient, Chelsea Stein, to the [facility] RHEUMATOLOGY at [facility]. Please see a copy of my visit note below.    Rheumatology Clinic - Fellow  Date: 06/10/19  CC: lupus    Patient Summary: 39 yo F with SSA+ SLE who transfered her care from [facility] (Dr. Tiffany Parkinson) and [facility] (Dr. Conde) and was pregnant with 2nd child at initial visit (10/15/18). More recently she is now s/p 2 pregnancies without any lupus related complications. She was diagnosed at 18 yo initially with raynaud's then in her 20s had a full flare and was diagnosed with lupus. She has not required steroids since 2016 though her disease is very steroid responsive. C4 levels and dsDNA levels do correspond to flares per patient. Before she moved her persistent neutropenia was evaluated via BM biopsy which was unremarkable.    Interval history (6/10/19): Chelsea continues to do well and is enjoying her new baby Joseph (sp?). Her son just started camp. She has cut out dairy due to Joseph's eczema and continues to breast feed. She has lost a significant amount of weight which she thinks is likely due to those changes. On review of lupus associated symptoms (as she has had few of them personally) she notes that she has had erythema of her cheeks and nose that comes and goes from day to day but is not as \"deep\" or scarring as her malar rash was back when she was diagnosed.     We discussed the option of reducing her plaquenil dose given weight change. After reviewing the slim risk for flare, she would like to go ahead and reduce to 200 mg daily.    ROS: A comprehensive ROS was done. Positives are per HPI.    Allergies   Allergen Reactions     Benadryl [Diphenhydramine] Other (See Comments)     Sedation and feel like dying     Seasonal Allergies      Other " reaction(s): Other: See Comments  Congestion        Current Outpatient Medications   Medication     hydroxychloroquine (PLAQUENIL) 200 MG tablet     Ferrous Sulfate (IRON SUPPLEMENT PO)     Prenatal Vit-Fe Fumarate-FA (PRENATAL MULTIVITAMIN PLUS IRON) 27-0.8 MG TABS per tablet     No current facility-administered medications for this visit.      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[place], is a psychologist,  is a neurosurgeon, has 2 children, nonsmoker, occasional drinker not at this time though, no drug use    Physical Exam:  BP 90/60   Pulse 77   Wt 46.9 kg (103 lb 8 oz)   SpO2 98%   BMI 18.33 kg/m      General: NAD, very pleasant  HEENT: face symmetric, mild dry mucosa, lacrimal pool adequate  Lymph: no submandibular, cervical, supraclavicular LAD  CV: rrr, no mrg  Resp: CTAB  Abd: pregnant, nt, nd  MSK: no weakness, gait wnl, full active ROM of all joints without pain  Skin: no rashes, no malar rash, no petechia, no bruising  Psych: congruent mood and affect    Data reviewed in care everywhere and in EPIC    Assessment:  37 yo F with well controlled lupus on plaquenil s/p healthy second pregnancy. No recent flares, no steroid need, mild oral sicca symptoms and facial erythema not consistent with malar rash.    Diagnosis:  1. SLE (MAURICIO+ 1:1280 speckled, Sm+, RNP+, SSA+, Chromatin Ab+, APS negative, low C4 which corresponds to flares as does dsDNA per patient, with raynaud's, arthralgias, malar rash, mild alopecia, oral ulcers, photosensitivity, leukopenia)  2. Sicca symptoms (dry mouth)  3. Drug monitoring: Plaquenil long term use    Plan:  1. reduce plaquenil to 200 mg daily.   Plaquenil started at 18 yo   Last eye exam: 19  2. Disease Monitoring Labs this week: C3, C4, dsDNA, U Pr/Cr    Repeat labs in 3 months  RTC in 6 months    Patient staffed with   Khoi Cartagena MD, PhD  Rheumatology Fellow  Pager [phone]    Attending Note: I saw and evaluated the patient with Dr. Cartagena. I agree with the assessment and plan.    Corrina Westfall MD    Orders Placed This Encounter   Procedures     CBC with platelets differential     Complement C3     UA with Microscopic     Protein timed urine with Creat Ratio     DNA double stranded antibodies     Creatinine     Complement C4       Sincerely,    Latoya Cartagena MD

## 2019-06-10 NOTE — PATIENT INSTRUCTIONS
I have placed every 3 months labs. Please stop by any [place] lab this week to have your disease monitoring labs drawn. Do this again in 3 months and tehn 6 months (right before coming in!)    Decrease your plaquenil to 200 mg crane (1 tablet)    See you in 6 months!

## 2019-06-10 NOTE — NURSING NOTE
"Chief Complaint   Patient presents with     RECHECK     lupus     Vital signs:      BP: 90/60 Pulse: 77     SpO2: 98 %       Weight: 46.9 kg (103 lb 8 oz)  Estimated body mass index is 18.33 kg/m  as calculated from the following:    Height as of 4/1/19: 1.6 m (5' 3\").    Weight as of this encounter: 46.9 kg (103 lb 8 oz).      \  Khloe Erazo    "

## 2019-06-10 NOTE — PROGRESS NOTES
"Rheumatology Clinic - Fellow  Date: 06/10/19  CC: lupus    Patient Summary: 39 yo F with SSA+ SLE who transfered her care from [facility] (Dr. Tiffany Parkinson) and [facility] (Dr. Conde) and was pregnant with 2nd child at initial visit (10/15/18). More recently she is now s/p 2 pregnancies without any lupus related complications. She was diagnosed at 20 yo initially with raynaud's then in her 20s had a full flare and was diagnosed with lupus. She has not required steroids since 2016 though her disease is very steroid responsive. C4 levels and dsDNA levels do correspond to flares per patient. Before she moved her persistent neutropenia was evaluated via BM biopsy which was unremarkable.    Interval history (6/10/19): Chelsea continues to do well and is enjoying her new baby Joseph (sp?). Her son just started camp. She has cut out dairy due to Joseph's eczema and continues to breast feed. She has lost a significant amount of weight which she thinks is likely due to those changes. On review of lupus associated symptoms (as she has had few of them personally) she notes that she has had erythema of her cheeks and nose that comes and goes from day to day but is not as \"deep\" or scarring as her malar rash was back when she was diagnosed.     We discussed the option of reducing her plaquenil dose given weight change. After reviewing the slim risk for flare, she would like to go ahead and reduce to 200 mg daily.    ROS: A comprehensive ROS was done. Positives are per HPI.    Allergies   Allergen Reactions     Benadryl [Diphenhydramine] Other (See Comments)     Sedation and feel like dying     Seasonal Allergies      Other reaction(s): Other: See Comments  Congestion        Current Outpatient Medications   Medication     hydroxychloroquine (PLAQUENIL) 200 MG tablet     Ferrous Sulfate (IRON SUPPLEMENT PO)     Prenatal Vit-Fe Fumarate-FA (PRENATAL MULTIVITAMIN PLUS IRON) 27-0.8 MG TABS per tablet     No current " facility-administered medications for this visit.      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[place], is a psychologist,  is a neurosurgeon, has 2 children, nonsmoker, occasional drinker not at this time though, no drug use    Physical Exam:  BP 90/60   Pulse 77   Wt 46.9 kg (103 lb 8 oz)   SpO2 98%   BMI 18.33 kg/m     General: NAD, very pleasant  HEENT: face symmetric, mild dry mucosa, lacrimal pool adequate  Lymph: no submandibular, cervical, supraclavicular LAD  CV: rrr, no mrg  Resp: CTAB  Abd: pregnant, nt, nd  MSK: no weakness, gait wnl, full active ROM of all joints without pain  Skin: no rashes, no malar rash, no petechia, no bruising  Psych: congruent mood and affect    Data reviewed in care everywhere and in EPIC    Assessment:  37 yo F with well controlled lupus on plaquenil s/p healthy second pregnancy. No recent flares, no steroid need, mild oral sicca symptoms and facial erythema not consistent with malar rash.    Diagnosis:  1. SLE (MAURICIO+ 1:1280 speckled, Sm+, RNP+, SSA+, Chromatin Ab+, APS negative, low C4 which corresponds to flares as does dsDNA per patient, with raynaud's, arthralgias, malar rash, mild alopecia, oral ulcers, photosensitivity, leukopenia)  2. Sicca symptoms (dry mouth)  3. Drug monitoring: Plaquenil long term use    Plan:  1. reduce plaquenil to 200 mg daily.   Plaquenil started at 18 yo   Last eye exam: 19  2. Disease Monitoring Labs this week: C3, C4, dsDNA, U Pr/Cr    Repeat labs in 3 months  RTC in 6 months    Patient staffed with Dr. Khoi Cartagena MD, PhD  Rheumatology Fellow  Pager [phone]    Attending Note: I saw and evaluated the patient with Dr. Cartagena. I agree with the assessment and plan.    Corrina Westfall MD    Orders Placed This Encounter   Procedures     CBC with platelets differential      Complement C3     UA with Microscopic     Protein timed urine with Creat Ratio     DNA double stranded antibodies     Creatinine     Complement C4

## 2019-06-22 ENCOUNTER — TELEPHONE (OUTPATIENT)
Dept: OBGYN | Facility: CLINIC | Age: 39
End: 2019-06-22

## 2019-06-22 RX ORDER — DICLOXACILLIN SODIUM 500 MG
500 CAPSULE ORAL 4 TIMES DAILY
Qty: 28 CAPSULE | Refills: 0 | Status: SHIPPED | OUTPATIENT
Start: 2019-06-22 | End: 2020-10-14

## 2019-06-22 NOTE — TELEPHONE ENCOUNTER
Pt has 6 month old baby.  Notes pain in breast for few days.  Today notes redness and temp to 103.0  Has had mastitis in the past.  Thinks she has mastitis. No allergies.  Rx sent for Dicloxicillin.    Sammi Kam MD

## 2019-06-24 ENCOUNTER — OFFICE VISIT (OUTPATIENT)
Dept: OBGYN | Facility: CLINIC | Age: 39
End: 2019-06-24
Attending: OBSTETRICS & GYNECOLOGY
Payer: COMMERCIAL

## 2019-06-24 VITALS
WEIGHT: 103.9 LBS | SYSTOLIC BLOOD PRESSURE: 85 MMHG | BODY MASS INDEX: 18.41 KG/M2 | HEART RATE: 86 BPM | DIASTOLIC BLOOD PRESSURE: 60 MMHG | HEIGHT: 63 IN

## 2019-06-24 ASSESSMENT — MIFFLIN-ST. JEOR: SCORE: 1120.42

## 2019-06-24 NOTE — PROGRESS NOTES
"Chelsea is a 39 y.o.  with recent delivery 2018. She is nursing almost exlusively; she does not pump.  On  she started to not feel well and Saturday felt very wiped out, temp to 102 and red on right breast.  She called on call and was started on Diclox 500 qid and has taken 6 doses.  She thinks her fever is less but has not checked; she feels a little better but states her breast has a larger red area.  She is here with her mom and baby.  BP (!) 85/60   Pulse 86   Ht 1.6 m (5' 3\")   Wt 47.1 kg (103 lb 14.4 oz)   BMI 18.41 kg/m     Appears healthy  Right breast: slightly pink along outer upper and outer lower aspect, no fluctuant masses.    A/P: F/U treatment for mastitis: clinically it appears that she is improving.  She will continue her abx.  She will call for fever, changes in breast, or generally not feeling well.  "

## 2019-06-24 NOTE — LETTER
"2019       RE: Chelsea Stein  [address]  [facility] [address]     Dear Colleague,    Thank you for referring your patient, Chelsea Stein, to the [facility] at [facility]. Please see a copy of my visit note below.    Chelsea is a 39 y.o.  with recent delivery 2018. She is nursing almost exlusively; she does not pump.  On  she started to not feel well and Saturday felt very wiped out, temp to 102 and red on right breast.  She called on call and was started on Diclox 500 qid and has taken 6 doses.  She thinks her fever is less but has not checked; she feels a little better but states her breast has a larger red area.  She is here with her mom and baby.  BP (!) 85/60   Pulse 86   Ht 1.6 m (5' 3\")   Wt 47.1 kg (103 lb 14.4 oz)   BMI 18.41 kg/m      Appears healthy  Right breast: slightly pink along outer upper and outer lower aspect, no fluctuant masses.    A/P: F/U treatment for mastitis: clinically it appears that she is improving.  She will continue her abx.  She will call for fever, changes in breast, or generally not feeling well.    Again, thank you for allowing me to participate in the care of your patient.      Sincerely,    Wallace Jose MD      "

## 2019-07-10 DIAGNOSIS — M32.9 SYSTEMIC LUPUS ERYTHEMATOSUS, UNSPECIFIED SLE TYPE, UNSPECIFIED ORGAN INVOLVEMENT STATUS (H): ICD-10-CM

## 2019-07-10 LAB
ALBUMIN UR-MCNC: NEGATIVE MG/DL
APPEARANCE UR: CLEAR
BASOPHILS # BLD AUTO: 0 10E9/L (ref 0–0.2)
BASOPHILS NFR BLD AUTO: 0 %
BILIRUB UR QL STRIP: NEGATIVE
COLOR UR AUTO: YELLOW
DIFFERENTIAL METHOD BLD: ABNORMAL
EOSINOPHIL # BLD AUTO: 0 10E9/L (ref 0–0.7)
EOSINOPHIL NFR BLD AUTO: 0.5 %
ERYTHROCYTE [DISTWIDTH] IN BLOOD BY AUTOMATED COUNT: 13 % (ref 10–15)
GLUCOSE UR STRIP-MCNC: NEGATIVE MG/DL
HCT VFR BLD AUTO: 38.7 % (ref 35–47)
HGB BLD-MCNC: 13.1 G/DL (ref 11.7–15.7)
HGB UR QL STRIP: NEGATIVE
KETONES UR STRIP-MCNC: NEGATIVE MG/DL
LEUKOCYTE ESTERASE UR QL STRIP: NEGATIVE
LYMPHOCYTES # BLD AUTO: 0.9 10E9/L (ref 0.8–5.3)
LYMPHOCYTES NFR BLD AUTO: 44.1 %
MCH RBC QN AUTO: 31.3 PG (ref 26.5–33)
MCHC RBC AUTO-ENTMCNC: 33.9 G/DL (ref 31.5–36.5)
MCV RBC AUTO: 92 FL (ref 78–100)
MONOCYTES # BLD AUTO: 0.2 10E9/L (ref 0–1.3)
MONOCYTES NFR BLD AUTO: 10.8 %
NEUTROPHILS # BLD AUTO: 0.9 10E9/L (ref 1.6–8.3)
NEUTROPHILS NFR BLD AUTO: 44.6 %
NITRATE UR QL: NEGATIVE
NON-SQ EPI CELLS #/AREA URNS LPF: NORMAL /LPF
PH UR STRIP: 5.5 PH (ref 5–7)
PLATELET # BLD AUTO: 198 10E9/L (ref 150–450)
RBC # BLD AUTO: 4.19 10E12/L (ref 3.8–5.2)
RBC #/AREA URNS AUTO: NORMAL /HPF
SOURCE: NORMAL
SP GR UR STRIP: 1.01 (ref 1–1.03)
UROBILINOGEN UR STRIP-ACNC: 0.2 EU/DL (ref 0.2–1)
WBC # BLD AUTO: 2 10E9/L (ref 4–11)
WBC #/AREA URNS AUTO: NORMAL /HPF

## 2019-07-10 PROCEDURE — 36415 COLL VENOUS BLD VENIPUNCTURE: CPT | Performed by: INTERNAL MEDICINE

## 2019-07-10 PROCEDURE — 81001 URINALYSIS AUTO W/SCOPE: CPT | Performed by: INTERNAL MEDICINE

## 2019-07-10 PROCEDURE — 85025 COMPLETE CBC W/AUTO DIFF WBC: CPT | Performed by: INTERNAL MEDICINE

## 2019-07-10 PROCEDURE — 86160 COMPLEMENT ANTIGEN: CPT | Mod: 59 | Performed by: INTERNAL MEDICINE

## 2019-07-10 PROCEDURE — 82565 ASSAY OF CREATININE: CPT | Performed by: INTERNAL MEDICINE

## 2019-07-10 PROCEDURE — 84156 ASSAY OF PROTEIN URINE: CPT | Performed by: INTERNAL MEDICINE

## 2019-07-10 PROCEDURE — 86160 COMPLEMENT ANTIGEN: CPT | Performed by: INTERNAL MEDICINE

## 2019-07-10 PROCEDURE — 86225 DNA ANTIBODY NATIVE: CPT | Performed by: INTERNAL MEDICINE

## 2019-07-11 DIAGNOSIS — D70.8 OTHER NEUTROPENIA (H): ICD-10-CM

## 2019-07-11 DIAGNOSIS — M32.9 SYSTEMIC LUPUS ERYTHEMATOSUS, UNSPECIFIED SLE TYPE, UNSPECIFIED ORGAN INVOLVEMENT STATUS (H): Primary | ICD-10-CM

## 2019-07-11 LAB
C3 SERPL-MCNC: 69 MG/DL (ref 76–169)
C4 SERPL-MCNC: 14 MG/DL (ref 15–50)
CREAT SERPL-MCNC: 0.69 MG/DL (ref 0.52–1.04)
DSDNA AB SER-ACNC: 2 IU/ML
GFR SERPL CREATININE-BSD FRML MDRD: >90 ML/MIN/{1.73_M2}
PROT UR-MCNC: 0.1 G/L
PROT/CREAT 24H UR: 0.13 G/G CR (ref 0–0.2)

## 2019-10-16 ENCOUNTER — TELEPHONE (OUTPATIENT)
Dept: RHEUMATOLOGY | Facility: CLINIC | Age: 39
End: 2019-10-16

## 2019-10-16 DIAGNOSIS — M32.9 SYSTEMIC LUPUS ERYTHEMATOSUS, UNSPECIFIED SLE TYPE, UNSPECIFIED ORGAN INVOLVEMENT STATUS (H): ICD-10-CM

## 2019-10-16 LAB
ALBUMIN UR-MCNC: NEGATIVE MG/DL
APPEARANCE UR: CLEAR
BASOPHILS # BLD AUTO: 0 10E9/L (ref 0–0.2)
BASOPHILS NFR BLD AUTO: 0 %
BILIRUB UR QL STRIP: NEGATIVE
COLOR UR AUTO: YELLOW
DIFFERENTIAL METHOD BLD: ABNORMAL
EOSINOPHIL # BLD AUTO: 0 10E9/L (ref 0–0.7)
EOSINOPHIL NFR BLD AUTO: 0.6 %
ERYTHROCYTE [DISTWIDTH] IN BLOOD BY AUTOMATED COUNT: 12.4 % (ref 10–15)
GLUCOSE UR STRIP-MCNC: NEGATIVE MG/DL
HCT VFR BLD AUTO: 38.9 % (ref 35–47)
HGB BLD-MCNC: 13.2 G/DL (ref 11.7–15.7)
HGB UR QL STRIP: NEGATIVE
KETONES UR STRIP-MCNC: NEGATIVE MG/DL
LEUKOCYTE ESTERASE UR QL STRIP: ABNORMAL
LYMPHOCYTES # BLD AUTO: 0.8 10E9/L (ref 0.8–5.3)
LYMPHOCYTES NFR BLD AUTO: 51.6 %
MCH RBC QN AUTO: 31.1 PG (ref 26.5–33)
MCHC RBC AUTO-ENTMCNC: 33.9 G/DL (ref 31.5–36.5)
MCV RBC AUTO: 92 FL (ref 78–100)
MONOCYTES # BLD AUTO: 0.3 10E9/L (ref 0–1.3)
MONOCYTES NFR BLD AUTO: 15.5 %
NEUTROPHILS # BLD AUTO: 0.5 10E9/L (ref 1.6–8.3)
NEUTROPHILS NFR BLD AUTO: 32.3 %
NITRATE UR QL: NEGATIVE
NON-SQ EPI CELLS #/AREA URNS LPF: ABNORMAL /LPF
PH UR STRIP: 6 PH (ref 5–7)
PLATELET # BLD AUTO: 167 10E9/L (ref 150–450)
RBC # BLD AUTO: 4.25 10E12/L (ref 3.8–5.2)
RBC #/AREA URNS AUTO: ABNORMAL /HPF
SOURCE: ABNORMAL
SP GR UR STRIP: 1.02 (ref 1–1.03)
UROBILINOGEN UR STRIP-ACNC: 0.2 EU/DL (ref 0.2–1)
WBC # BLD AUTO: 1.6 10E9/L (ref 4–11)
WBC #/AREA URNS AUTO: ABNORMAL /HPF

## 2019-10-16 PROCEDURE — 86160 COMPLEMENT ANTIGEN: CPT | Performed by: INTERNAL MEDICINE

## 2019-10-16 PROCEDURE — 84156 ASSAY OF PROTEIN URINE: CPT | Performed by: STUDENT IN AN ORGANIZED HEALTH CARE EDUCATION/TRAINING PROGRAM

## 2019-10-16 PROCEDURE — 86160 COMPLEMENT ANTIGEN: CPT | Mod: 59 | Performed by: INTERNAL MEDICINE

## 2019-10-16 PROCEDURE — 85025 COMPLETE CBC W/AUTO DIFF WBC: CPT | Performed by: INTERNAL MEDICINE

## 2019-10-16 PROCEDURE — 81001 URINALYSIS AUTO W/SCOPE: CPT | Performed by: INTERNAL MEDICINE

## 2019-10-16 PROCEDURE — 36415 COLL VENOUS BLD VENIPUNCTURE: CPT | Performed by: INTERNAL MEDICINE

## 2019-10-16 PROCEDURE — 86225 DNA ANTIBODY NATIVE: CPT | Performed by: INTERNAL MEDICINE

## 2019-10-16 NOTE — TELEPHONE ENCOUNTER
Spoke to Chelsea and she is reporting that she was feeling a little more achy over the past week, which did prompt her to go have her labs done today.    Chelsea reports that she was feeling hip pain for the past three days in addition to pain in both hands and in her fingers.   She states that she has also been feeling somewhat fatigue, but she is still nursing so she thinks that could be one of the factors.     As per Dr. Klein:  Increase Plaquenil to 1-1/2 tabs and to have repeat labs in two weeks.     Chelsea has no additional questions as this time.     I will route the additional information to Dr. Ferraro for further recommendations.     Kaylan Alvarado MSN, RN  Rheumatology RN Care Coordinator   [facility]

## 2019-10-16 NOTE — TELEPHONE ENCOUNTER
Received a call from LAB regarding critical WBC at 1.16.    This value was reported to Dr. Ferraro.    Was unable to connect with Chelsea, left her a message to return my call as soon as she gets the message; in regards to her labs.    Kaylan Alvarado MSN, RN  Rheumatology RN Care Coordinator  [facility]

## 2019-10-17 ENCOUNTER — MYC MEDICAL ADVICE (OUTPATIENT)
Dept: RHEUMATOLOGY | Facility: CLINIC | Age: 39
End: 2019-10-17

## 2019-10-17 DIAGNOSIS — M32.9 SYSTEMIC LUPUS ERYTHEMATOSUS, UNSPECIFIED SLE TYPE, UNSPECIFIED ORGAN INVOLVEMENT STATUS (H): Primary | ICD-10-CM

## 2019-10-17 LAB
C3 SERPL-MCNC: 72 MG/DL (ref 76–169)
C4 SERPL-MCNC: 15 MG/DL (ref 15–50)
CREAT UR-MCNC: 154 MG/DL
DSDNA AB SER-ACNC: 3 IU/ML
PROT 24H UR-MRATE: NORMAL G/(24.H) (ref 0–0.15)
PROT UR-MCNC: 0.23 G/L
PROT UR-MCNC: NORMAL G/L
PROT/CREAT 24H UR: 0.15 G/G CR (ref 0–0.2)
PROT/CREAT 24H UR: NORMAL G/G CR (ref 0–0.2)

## 2019-10-21 RX ORDER — PREDNISONE 5 MG/1
TABLET ORAL
Qty: 42 TABLET | Refills: 0 | Status: SHIPPED | OUTPATIENT
Start: 2019-10-21 | End: 2019-11-11

## 2019-12-16 ENCOUNTER — TELEPHONE (OUTPATIENT)
Dept: RHEUMATOLOGY | Facility: CLINIC | Age: 39
End: 2019-12-16

## 2019-12-16 DIAGNOSIS — M32.9 SYSTEMIC LUPUS ERYTHEMATOSUS, UNSPECIFIED SLE TYPE, UNSPECIFIED ORGAN INVOLVEMENT STATUS (H): ICD-10-CM

## 2019-12-16 LAB
ALBUMIN UR-MCNC: NEGATIVE MG/DL
APPEARANCE UR: CLEAR
BASOPHILS # BLD AUTO: 0 10E9/L (ref 0–0.2)
BASOPHILS NFR BLD AUTO: 0 %
BILIRUB UR QL STRIP: NEGATIVE
COLOR UR AUTO: YELLOW
DIFFERENTIAL METHOD BLD: ABNORMAL
EOSINOPHIL # BLD AUTO: 0 10E9/L (ref 0–0.7)
EOSINOPHIL NFR BLD AUTO: 0.6 %
ERYTHROCYTE [DISTWIDTH] IN BLOOD BY AUTOMATED COUNT: 12.5 % (ref 10–15)
GLUCOSE UR STRIP-MCNC: NEGATIVE MG/DL
HCT VFR BLD AUTO: 38.1 % (ref 35–47)
HGB BLD-MCNC: 13.1 G/DL (ref 11.7–15.7)
HGB UR QL STRIP: NEGATIVE
KETONES UR STRIP-MCNC: NEGATIVE MG/DL
LEUKOCYTE ESTERASE UR QL STRIP: ABNORMAL
LYMPHOCYTES # BLD AUTO: 0.9 10E9/L (ref 0.8–5.3)
LYMPHOCYTES NFR BLD AUTO: 48.6 %
MCH RBC QN AUTO: 31.3 PG (ref 26.5–33)
MCHC RBC AUTO-ENTMCNC: 34.4 G/DL (ref 31.5–36.5)
MCV RBC AUTO: 91 FL (ref 78–100)
MONOCYTES # BLD AUTO: 0.2 10E9/L (ref 0–1.3)
MONOCYTES NFR BLD AUTO: 13.3 %
NEUTROPHILS # BLD AUTO: 0.7 10E9/L (ref 1.6–8.3)
NEUTROPHILS NFR BLD AUTO: 37.5 %
NITRATE UR QL: NEGATIVE
NON-SQ EPI CELLS #/AREA URNS LPF: ABNORMAL /LPF
PH UR STRIP: 5.5 PH (ref 5–7)
PLATELET # BLD AUTO: 201 10E9/L (ref 150–450)
PROT UR-MCNC: 0.21 G/L
PROT/CREAT 24H UR: 0.27 G/G CR (ref 0–0.2)
RBC # BLD AUTO: 4.18 10E12/L (ref 3.8–5.2)
RBC #/AREA URNS AUTO: ABNORMAL /HPF
SOURCE: ABNORMAL
SP GR UR STRIP: 1.02 (ref 1–1.03)
UROBILINOGEN UR STRIP-ACNC: 0.2 EU/DL (ref 0.2–1)
WBC # BLD AUTO: 1.8 10E9/L (ref 4–11)
WBC #/AREA URNS AUTO: ABNORMAL /HPF

## 2019-12-16 PROCEDURE — 86225 DNA ANTIBODY NATIVE: CPT | Performed by: STUDENT IN AN ORGANIZED HEALTH CARE EDUCATION/TRAINING PROGRAM

## 2019-12-16 PROCEDURE — 36415 COLL VENOUS BLD VENIPUNCTURE: CPT | Performed by: STUDENT IN AN ORGANIZED HEALTH CARE EDUCATION/TRAINING PROGRAM

## 2019-12-16 PROCEDURE — 81001 URINALYSIS AUTO W/SCOPE: CPT | Performed by: STUDENT IN AN ORGANIZED HEALTH CARE EDUCATION/TRAINING PROGRAM

## 2019-12-16 PROCEDURE — 84156 ASSAY OF PROTEIN URINE: CPT | Performed by: STUDENT IN AN ORGANIZED HEALTH CARE EDUCATION/TRAINING PROGRAM

## 2019-12-16 PROCEDURE — 85025 COMPLETE CBC W/AUTO DIFF WBC: CPT | Performed by: STUDENT IN AN ORGANIZED HEALTH CARE EDUCATION/TRAINING PROGRAM

## 2019-12-16 PROCEDURE — 86160 COMPLEMENT ANTIGEN: CPT | Performed by: STUDENT IN AN ORGANIZED HEALTH CARE EDUCATION/TRAINING PROGRAM

## 2019-12-16 NOTE — TELEPHONE ENCOUNTER
Called and spoke with pt.  She did say that the last prednisone taper she did, helped a little bit.    About 3 weeks ago starting having a throbbing ache in hips, will alternate sides.  Does get it in her legs and arms and back.  Will stay around for a day, will subside and then return.      She has been feeling ok, did have a stomach virus over the weekend of the 14th. She has been recovering from that.     She is coming to see Dr. Ferraro on Wednesday.  Will follow up with her with Dr. Ferraro's response.    Keturah Hatch RN  Rheumatology Clinic

## 2019-12-16 NOTE — TELEPHONE ENCOUNTER
[facility] calling in a critical lab value.      Critical labs: WBC 1.9    Called and updated Dr. Cartagena.  This is slightly better than     Call and see how pt is feeling. See how she felt after the steroid taper from 2 months ago.    Left message requesting return call to discuss how pt is feeling.    Keturah Hatch RN  Rheumatology Clinic

## 2019-12-17 ENCOUNTER — PRE VISIT (OUTPATIENT)
Dept: RHEUMATOLOGY | Facility: CLINIC | Age: 39
End: 2019-12-17

## 2019-12-17 LAB
C3 SERPL-MCNC: 81 MG/DL (ref 76–169)
C4 SERPL-MCNC: 17 MG/DL (ref 15–50)

## 2019-12-17 NOTE — TELEPHONE ENCOUNTER
Left message for pt reminding them of upcoming appointment.  Instructed pt to bring updated medications list.    Lilibeth Lee, CMA

## 2019-12-17 NOTE — TELEPHONE ENCOUNTER
Reviewed labs. Slight improvement in chronic leukopenia. Baseline protein/creatinine ratio. Complements and dsDNA are pending. Given patient symptoms, discussed with Keturah earlier, I am concerned she may have ongoing, low grade lupus activity. It has not been long enough for change in plaquenil dose to kick in (increased to 1 1/2 pills in October). We will discuss steroid sparing agents Wednesday versus waiting for full plaquenil effect this wednesday at her visit.    Latoya Cartagena MD, PhD

## 2019-12-18 ENCOUNTER — OFFICE VISIT (OUTPATIENT)
Dept: RHEUMATOLOGY | Facility: CLINIC | Age: 39
End: 2019-12-18
Attending: STUDENT IN AN ORGANIZED HEALTH CARE EDUCATION/TRAINING PROGRAM
Payer: COMMERCIAL

## 2019-12-18 VITALS
SYSTOLIC BLOOD PRESSURE: 88 MMHG | TEMPERATURE: 97.5 F | HEART RATE: 91 BPM | HEIGHT: 63 IN | WEIGHT: 104.9 LBS | OXYGEN SATURATION: 99 % | DIASTOLIC BLOOD PRESSURE: 67 MMHG | BODY MASS INDEX: 18.59 KG/M2

## 2019-12-18 DIAGNOSIS — M32.9 SYSTEMIC LUPUS ERYTHEMATOSUS, UNSPECIFIED SLE TYPE, UNSPECIFIED ORGAN INVOLVEMENT STATUS (H): Primary | ICD-10-CM

## 2019-12-18 PROCEDURE — G0463 HOSPITAL OUTPT CLINIC VISIT: HCPCS | Mod: ZF

## 2019-12-18 RX ORDER — PREDNISONE 5 MG/1
TABLET ORAL
Qty: 36 TABLET | Refills: 0 | Status: SHIPPED | OUTPATIENT
Start: 2019-12-18 | End: 2020-01-31

## 2019-12-18 ASSESSMENT — PAIN SCALES - GENERAL: PAINLEVEL: MILD PAIN (2)

## 2019-12-18 ASSESSMENT — MIFFLIN-ST. JEOR: SCORE: 1124.95

## 2019-12-18 NOTE — NURSING NOTE
"Chief Complaint   Patient presents with     RECHECK     lupus     BP (!) 88/67   Pulse 91   Temp 97.5  F (36.4  C) (Oral)   Ht 1.6 m (5' 3\")   Wt 47.6 kg (104 lb 14.4 oz)   SpO2 99%   BMI 18.58 kg/m    Kary Hughes CMA    "

## 2019-12-18 NOTE — PROGRESS NOTES
Rheumatology Clinic - Fellow  Date: 2019  CC: lupus    Patient Summary: 40 yo F with SSA+ SLE who transfered her care from [facility] (Dr. Tiffany Parkinson) and [facility] (Dr. Conde) and was pregnant with 2nd child at initial visit (10/15/18). More recently she is now s/p 2 pregnancies without any lupus related complications. She was diagnosed at 20 yo initially with raynaud's then in her 20s had a full flare and was diagnosed with lupus. She has not required steroids since 2016 though her disease is very steroid responsive. C4 levels and dsDNA levels do correspond to flares per patient. Before she moved her persistent neutropenia was evaluated via BM biopsy which was unremarkable.    Interval history (2019): has bene having aching in various joints. Seems to come and go. When this occurred in October it was steroid responsive. Has not stopped her from exercise but on worst day her whole right side, each joint, seems to be painful and have pressure. Has not noticed that it is worst in morning. Generally tries to just ignore it. No rashes, no oral lesions, no hair loss.    ROS: A comprehensive ROS was done. Positives are per HPI.    Allergies   Allergen Reactions     Benadryl [Diphenhydramine] Other (See Comments)     Sedation and feel like dying     Seasonal Allergies      Other reaction(s): Other: See Comments  Congestion        Current Outpatient Medications   Medication     hydroxychloroquine (PLAQUENIL) 200 MG tablet     predniSONE (DELTASONE) 5 MG tablet     dicloxacillin (DYNAPEN) 500 MG capsule     Ferrous Sulfate (IRON SUPPLEMENT PO)     Prenatal Vit-Fe Fumarate-FA (PRENATAL MULTIVITAMIN PLUS IRON) 27-0.8 MG TABS per tablet     No current facility-administered medications for this visit.      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[place], is a psychologist,  is a neurosurgeon, has 2 children, nonsmoker, occasional drinker not  "at this time though, no drug use    Physical Exam:  BP (!) 88/67   Pulse 91   Temp 97.5  F (36.4  C) (Oral)   Ht 1.6 m (5' 3\")   Wt 47.6 kg (104 lb 14.4 oz)   SpO2 99%   BMI 18.58 kg/m     General: NAD, very pleasant, very thin  HEENT: face symmetric, mild dry mucosa, lacrimal pool adequate, no oral lesions  Lymph: no submandibular, cervical, supraclavicular LAD  CV: rrr, no mrg  Resp: CTAB  Abd: nt, nd  MSK: no weakness, gait wnl, full active ROM of all joints without pain  Skin: no rashes, no malar rash, no petechia, no bruising  Psych: congruent mood and affect    Data reviewed in care everywhere and in Epic. dsDNA is still pending.    Assessment:  38 yo F with well lupus on plaquenil s/p healthy second pregnancy. Considering her significant post-partum weight loss, long period of plaquenil use, and stability of disease, we had decreased her plaquenil to 200 mg daily at last visit in June. She began to experience mild lupus flares in October consisting of fatigue and joint pain responsive to steroids. Plaquenil was increase back to 300 mg (6 mg/kg) daily again in October. Although her labs are overall reassuring, her very mild but new proteinuria is concerning.     1. Joint flares  - may subside once we are 6 months post plaquenil dose change (march 2019) as they were stable for years prior on plaquenil alone  - will buy time with prednisone low burst and extended taper and re-evalaute  - if persists despite this will consider methotrexate v. Imuran    2. Mild proteinuria  - will repeat in 3 weeks. If persistently elevated, will refer to nephrology    3. Persistent leukopenia, specifically low neutrophils  - has previously had bone marrow biopsy that was not concerning  - stable    Diagnosis:  1. SLE (MAURICIO+ 1:1280 speckled, Sm+, RNP+, SSA+, Chromatin Ab+, APS negative, low C4 which corresponds to flares as does dsDNA per patient, with raynaud's, arthralgias, malar rash, mild alopecia, oral ulcers, " photosensitivity, leukopenia)  2. Sicca symptoms (dry mouth)  3. Drug monitoring: Plaquenil long term use    Plan:  1. continue plaquenil 300 mg daily.   Plaquenil started at 18 yo   Last eye exam: 5/7/19  2. Start prednisone burst and taper   1 week 10 mg daily   1 week 5 mg daily   1 month 2.5 mg daily   Then stop  3. Disease Monitoring Labs in 3 weeks: C3, C4, dsDNA, U Pr/Cr  4. RTC in 2 months    Patient staffed with Dr. Elito Cartagena MD, PhD  Rheumatology Fellow  Pager [phone]    I saw this patient with the Rheumatology Fellow. I agree with the findings and recommendations.    Malik Mccabe M.D.  Staff Rheumatologist, [facility]  Pager [phone]

## 2019-12-18 NOTE — PATIENT INSTRUCTIONS
For your joint pains, which we think is related to lupus activity, let's start prednisone as follows:  -- 1 week of 10 mg daily prednisone  -- 1 week of 5 mg daily prednisone  -- 1 month of 2.5 mg daily prednisone    The prednisone will buy us time for the plaquenil to kick in    Continue plaquenil 300 mg daily    Please stop by the lab and get your blood and urine tested in 3 weeks    See you in 2 months - we will consider methotrexate versus imuran if needed

## 2019-12-18 NOTE — LETTER
2019    RE: Chelsea Stein  [address]  [place] [address]     Rheumatology Clinic - Fellow  Date: 2019  CC: lupus    Patient Summary: 38 yo F with SSA+ SLE who transfered her care from [facility] (Dr. Tiffany Parkinson) and [facility] (Dr. Conde) and was pregnant with 2nd child at initial visit (10/15/18). More recently she is now s/p 2 pregnancies without any lupus related complications. She was diagnosed at 18 yo initially with raynaud's then in her 20s had a full flare and was diagnosed with lupus. She has not required steroids since 2016 though her disease is very steroid responsive. C4 levels and dsDNA levels do correspond to flares per patient. Before she moved her persistent neutropenia was evaluated via BM biopsy which was unremarkable.    Interval history (2019): has bene having aching in various joints. Seems to come and go. When this occurred in October it was steroid responsive. Has not stopped her from exercise but on worst day her whole right side, each joint, seems to be painful and have pressure. Has not noticed that it is worst in morning. Generally tries to just ignore it. No rashes, no oral lesions, no hair loss.    ROS: A comprehensive ROS was done. Positives are per HPI.    Allergies   Allergen Reactions     Benadryl [Diphenhydramine] Other (See Comments)     Sedation and feel like dying     Seasonal Allergies      Other reaction(s): Other: See Comments  Congestion        Current Outpatient Medications   Medication     hydroxychloroquine (PLAQUENIL) 200 MG tablet     predniSONE (DELTASONE) 5 MG tablet     dicloxacillin (DYNAPEN) 500 MG capsule     Ferrous Sulfate (IRON SUPPLEMENT PO)     Prenatal Vit-Fe Fumarate-FA (PRENATAL MULTIVITAMIN PLUS IRON) 27-0.8 MG TABS per tablet     No current facility-administered medications for this visit.      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[place], is a  "psychologist,  is a neurosurgeon, has 2 children, nonsmoker, occasional drinker not at this time though, no drug use    Physical Exam:  BP (!) 88/67   Pulse 91   Temp 97.5  F (36.4  C) (Oral)   Ht 1.6 m (5' 3\")   Wt 47.6 kg (104 lb 14.4 oz)   SpO2 99%   BMI 18.58 kg/m      General: NAD, very pleasant, very thin  HEENT: face symmetric, mild dry mucosa, lacrimal pool adequate, no oral lesions  Lymph: no submandibular, cervical, supraclavicular LAD  CV: rrr, no mrg  Resp: CTAB  Abd: nt, nd  MSK: no weakness, gait wnl, full active ROM of all joints without pain  Skin: no rashes, no malar rash, no petechia, no bruising  Psych: congruent mood and affect    Data reviewed in care everywhere and in Epic. dsDNA is still pending.    Assessment:  38 yo F with well lupus on plaquenil s/p healthy second pregnancy. Considering her significant post-partum weight loss, long period of plaquenil use, and stability of disease, we had decreased her plaquenil to 200 mg daily at last visit in June. She began to experience mild lupus flares in October consisting of fatigue and joint pain responsive to steroids. Plaquenil was increase back to 300 mg (6 mg/kg) daily again in October. Although her labs are overall reassuring, her very mild but new proteinuria is concerning.     1. Joint flares  - may subside once we are 6 months post plaquenil dose change (march 2019) as they were stable for years prior on plaquenil alone  - will buy time with prednisone low burst and extended taper and re-evalaute  - if persists despite this will consider methotrexate v. Imuran    2. Mild proteinuria  - will repeat in 3 weeks. If persistently elevated, will refer to nephrology    3. Persistent leukopenia, specifically low neutrophils  - has previously had bone marrow biopsy that was not concerning  - stable    Diagnosis:  1. SLE (MAURICIO+ 1:1280 speckled, Sm+, RNP+, SSA+, Chromatin Ab+, APS negative, low C4 which corresponds to flares as does dsDNA " per patient, with raynaud's, arthralgias, malar rash, mild alopecia, oral ulcers, photosensitivity, leukopenia)  2. Sicca symptoms (dry mouth)  3. Drug monitoring: Plaquenil long term use    Plan:  1. continue plaquenil 300 mg daily.   Plaquenil started at 18 yo   Last eye exam: 5/7/19  2. Start prednisone burst and taper   1 week 10 mg daily   1 week 5 mg daily   1 month 2.5 mg daily   Then stop  3. Disease Monitoring Labs in 3 weeks: C3, C4, dsDNA, U Pr/Cr  4. RTC in 2 months    Patient staffed with Dr. Eliot Cartagena MD, PhD  Rheumatology Fellow  Pager [phone]    I saw this patient with the Rheumatology Fellow. I agree with the findings and recommendations.    Malik Mccabe M.D.  Staff Rheumatologist, [facility]  Pager [phone]

## 2019-12-19 LAB — DSDNA AB SER-ACNC: 3 IU/ML

## 2020-01-29 ENCOUNTER — APPOINTMENT (OUTPATIENT)
Dept: GENERAL RADIOLOGY | Facility: CLINIC | Age: 40
End: 2020-01-29
Attending: EMERGENCY MEDICINE
Payer: COMMERCIAL

## 2020-01-29 ENCOUNTER — HOSPITAL ENCOUNTER (EMERGENCY)
Facility: CLINIC | Age: 40
Discharge: HOME | End: 2020-01-29
Attending: EMERGENCY MEDICINE | Admitting: EMERGENCY MEDICINE
Payer: COMMERCIAL

## 2020-01-29 VITALS
DIASTOLIC BLOOD PRESSURE: 78 MMHG | RESPIRATION RATE: 18 BRPM | WEIGHT: 105 LBS | HEIGHT: 63 IN | SYSTOLIC BLOOD PRESSURE: 100 MMHG | TEMPERATURE: 97.8 F | BODY MASS INDEX: 18.61 KG/M2 | OXYGEN SATURATION: 100 %
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
## 2020-01-29 DIAGNOSIS — M25.521 RIGHT ELBOW PAIN: ICD-10-CM

## 2020-01-29 PROCEDURE — 73080 X-RAY EXAM OF ELBOW: CPT | Mod: RT

## 2020-01-29 PROCEDURE — 99284 EMERGENCY DEPT VISIT MOD MDM: CPT

## 2020-01-29 PROCEDURE — 25000132 ZZH RX MED GY IP 250 OP 250 PS 637: Performed by: EMERGENCY MEDICINE

## 2020-01-29 PROCEDURE — 29125 APPL SHORT ARM SPLINT STATIC: CPT | Mod: RT

## 2020-01-29 RX ORDER — IBUPROFEN 600 MG/1
600 TABLET, FILM COATED ORAL ONCE
Status: COMPLETED | OUTPATIENT
Start: 2020-01-29 | End: 2020-01-29

## 2020-01-29 RX ORDER — IBUPROFEN 600 MG/1
600 TABLET, FILM COATED ORAL EVERY 6 HOURS PRN
Qty: 30 TABLET | Refills: 0 | Status: SHIPPED | OUTPATIENT
Start: 2020-01-29 | End: 2021-08-27

## 2020-01-29 RX ADMIN — IBUPROFEN 600 MG: 600 TABLET ORAL at 21:54

## 2020-01-29 ASSESSMENT — ENCOUNTER SYMPTOMS
ARTHRALGIAS: 1
JOINT SWELLING: 1
HEADACHES: 0

## 2020-01-29 ASSESSMENT — MIFFLIN-ST. JEOR: SCORE: 1125.41

## 2020-01-29 NOTE — ED AVS SNAPSHOT
Emergency Department  [address] [place] [id number]  Phone:  [phone]  Fax:  [phone]                                    Chelsea Stein   MRN: [id number]    Department:   Emergency Department   Date of Visit:  1/29/2020           After Visit Summary Signature Page    I have received my discharge instructions, and my questions have been answered. I have discussed any challenges I see with this plan with the nurse or doctor.    ..........................................................................................................................................  Patient/Patient Representative Signature      ..........................................................................................................................................  Patient Representative Print Name and Relationship to Patient    ..................................................               ................................................  Date                                   Time    ..........................................................................................................................................  Reviewed by Signature/Title    ...................................................              ..............................................  Date                                               Time          22EPIC Rev 08/18

## 2020-01-30 NOTE — DISCHARGE INSTRUCTIONS
Discharge Instructions  Splint Care    You had a splint put on today to help protect your injury and help it heal.  Splints are used to treat things like strains, sprains, large cuts, and fractures (broken bones). Splints are temporary and are designed to allow for swelling.    Be sure your splint is not too tight!  If you splint is too tight, it may cause loss of blood supply.  Signs of your splint being too tight include: your arm or leg hurting a lot more; your fingers or toes getting numb, cold, pale or blue; or your child is crying, fussing or seeming restless.    Generally, every Emergency Department visit should have a follow-up clinic visit with either a primary or a specialty clinic/provider. Please follow-up as instructed by your emergency provider today.  Return to the Emergency Department right away if:  You have increased pain or pressure around the injury.  You have numbness, tingling, or cool, pale, or blue toes or fingers past the injury.  Your child is more fussy than normal, crying a lot, or restless.  Your splint becomes soft, breaks, or is wet.  Your splint begins to smell bad.  Your splint is cutting into your skin.    Home care:  Keep the injured area above the level of your heart while laying or sitting down.  This will help decrease the swelling and the pain.  Keep the splint dry.  Do not put objects down or inside the splint.  If there is an elastic bandage (Ace  wrap) holding the splint on this may be loosened slightly to relieve pressure or pain.  If pain continues return to the Emergency Department right away.  Do not remove your splint by yourself unless told to by your provider.    Follow-up:  Sometimes the splint put on in the Emergency Department needs to be changed once the swelling has gone down and a more permanent cast needs to be placed.  This is usually done by a bone specialist provider (Orthopedist).  Follow the instructions given to you by your provider today.    If you were  given a prescription for medicine here today, be sure to read all of the information (including the package insert) that comes with your prescription.  This will include important information about the medicine, its side effects, and any warnings that you need to know about.  The pharmacist who fills the prescription can provide more information and answer questions you may have about the medicine.  If you have questions or concerns that the pharmacist cannot address, please call or return to the Emergency Department.     Remember that you can always come back to the Emergency Department if you are not able to see your regular provider in the amount of time listed above, if you get any new symptoms, or if there is anything that worries you.     1st

## 2020-01-30 NOTE — ED PROVIDER NOTES
"  History     Chief Complaint:  Arm Pain    The history is provided by the patient.      Chelsea Stein is a right-hand-dominant 39 year old female with a history of surgery repair for a right broken humerus (15+ years ago) who presents to the emergency department for evaluation of right arm pain. The patient reports experiencing right elbow pain after she used her hand to break her fall as she slipped and fell on the ice as she was leaving work tonight. She reports that her right elbow pain increases with movement, but denies hitting her head, losing consciousness before or after the fall, or experiencing shoulder pain.     Allergies:  Benadryl  Seasonal allergies.    Medications:    Ibuprofen  Plaquenil  Dynapen    Past Medical History:    Anemia  Systemic lupus erthematosus  Cervical polyp  Leukopenia    Past Surgical History:    Surgical repair of broken right humerus    Family History:    No past pertinent family history.    Social History:  Presents alone  Never smoker  No alcohol use  No drug use  Marital Status:   [2]    Review of Systems   Musculoskeletal: Positive for arthralgias and joint swelling.   Neurological: Negative for syncope and headaches.   All other systems reviewed and are negative.    Physical Exam     Patient Vitals for the past 24 hrs:   BP Temp Temp src Heart Rate Resp SpO2 Height Weight   01/29/20 2356 100/78 -- -- 78 18 100 % -- --   01/29/20 2148 99/58 97.8  F (36.6  C) Oral 88 20 97 % 1.6 m (5' 3\") 47.6 kg (105 lb)       Physical Exam  General: Resting comfortably on the gurney  Head:  The scalp, face, and head appear normal  Eyes:  The pupils are normal    Conjunctivae and sclera appear normal  ENT:    The nose is normal    Ears/pinnae are normal  Neck:  Normal range of motion  CV:  Regular rate and rhythm.  No murmur.  Resp:  Clear to ascultation bilaterally.   MS:  Right Elbow:    The humerus is non-tender    The olecranon is tender    The lateral and medial supracondylar " regions are non-tender    There is no obvious clinical effusion    There is no pain with Pronation and Supination    There is pain with Extension and cannot extend beyond 120 degrees.    The radial head and neck are non-tender    The proximal ulnar is non-tender and there is no step off    Distal Hand Exam:    The finger flexors (FDS/FDP) are intact    The finger extensors are intact    The thumb exam is normal, including:    Adduction, abduction, flexion, extension, opposition    There are no sensory deficits    Median, Ulnar, and Radial nerve function is normal    Radial artery pulsations are normal    Capillary refill is normal  Skin:  No rash or lesions noted.  Neuro: Speech is normal and fluent  Psych:  Awake. Alert.  Normal affect.      Appropriate interactions    Emergency Department Course     Imaging:  Radiology findings were communicated with the patient who voiced understanding of the findings.    XR ELBOW RT G/E 3 VW                                    IMPRESSION: No acute fracture or dislocation. There is an elbow joint effusion which may be due to an occult fracture and follow-up is recommended as clinically warranted. Screws and sideplate cross an old distal humerus fracture.  As per radiology.    Procedures:       Splint Placement     Sugar tong splint was applied and after placement I checked and adjusted the fit to ensure proper positioning. Patient was more comfortable with splint in place. Sensation and circulation are intact after splint placement.    Interventions:  2154 Advil 500 mg PO    Emergency Department Course:  Past medical records, nursing notes, and vitals reviewed.    2315 I performed an exam of the patient as documented above.     The patient was sent for a while in the emergency department, results above.     2343 I rechecked the patient and discussed the results of her workup thus far.     Findings and plan explained to the Patient. Patient discharged home with instructions  regarding supportive care, medications, and reasons to return. The importance of close follow-up was reviewed. The patient was prescribed Advil.     I personally reviewed the imaging results with the Patient and answered all related questions prior to discharge.     Impression & Plan     Medical Decision Making:  Chelsea Stein is a 39-year-old right handed dominant female who presents after a fall while slipping on ice and subsequent right elbow pain.  Patient did have previous surgery to the right humerus over a decade ago.  There does not appear to be an acute fracture dislocation on x-ray imaging tonight, but there is an elbow joint effusion which may be secondary to an occult fracture.  Due to concerns for occult fracture, we will place the patient in a splint per procedure note.  Plans for close outpatient follow-up with [facility] in 3 to 5 days.  Tylenol and ibuprofen for pain control as needed.  Discharged home with strict return precautions and follow-up instructions.    Diagnosis:    ICD-10-CM    1. Right elbow pain M25.521     potential occult fracture   2. Fall, initial encounter W19.XXXA        Disposition:  Discharged to home.    Discharge Medications:  New Prescriptions    IBUPROFEN (ADVIL/MOTRIN) 600 MG TABLET    Take 1 tablet (600 mg) by mouth every 6 hours as needed for moderate pain       Scribe Disclosure:  I, Paul Carpenter, am serving as a scribe at 11:15 PM on 1/29/2020 to document services personally performed by Tj Banuelos MD based on my observations and the provider's statements to me.     I, William Wetzel, am serving as a scribe on 1/29/2020 at 11:58 PM to personally document services performed by Tj Banuelos MD based on my observations and the provider's statements to me.      Tj Banuelos MD  01/30/20 0229

## 2020-03-11 ENCOUNTER — HEALTH MAINTENANCE LETTER (OUTPATIENT)
Age: 40
End: 2020-03-11

## 2020-06-30 DIAGNOSIS — O99.891 SYSTEMIC LUPUS COMPLICATING PREGNANCY (H): ICD-10-CM

## 2020-06-30 DIAGNOSIS — M32.9 SYSTEMIC LUPUS COMPLICATING PREGNANCY (H): ICD-10-CM

## 2020-06-30 NOTE — LETTER
Dear Chelsea Stein,     Regular eye exams are required while taking hydroxychloroquine (Plaquenil). Eye exams should be completed by an eye specialist who is experienced in monitoring for hydroxychloroquine toxicity (a rare effect of the drug that can damage your eyes and vision).  These may be yearly or as determined by your eye specialist.     Although vision problems and loss of sight while taking hydroxychloroquine are very rare, notify your doctor immediately if you notice changes in your vision. The goal of screening is to detect toxicity before your vision is significantly or noticeably impacted. Failing to get proper screening exams puts you at risk of vision changes which may or may not be reversible.    Per the American Academy of Ophthalmology recommendations (2016), screening tests performed may include a 10-2 visual field test, spectral-domain optical coherence tomography (SD OCT), or other screening tests as determined by the eye specialist, including a multifocal electroretinogram (mfERG) or fundus auto-fluorescence (FAF).    We received a refill request from your pharmacy. A copy of your current eye exam was not found in your medical record. Your hydroxychloroquine prescription has been refilled with a limited supply pending confirmation you have had an eye exam to test for hydroxychloroquine toxicity.      We encourage you to bring this letter to your eye exam to discuss the exams that will be performed during your visit. Please request your eye clinic fax or mail a copy of your eye exam report to our clinic indicating that testing was completed for hydroxychloroquine toxicity screening. The exam notes must specifically comment on the potential for hydroxychloroquine toxicity and outline recommended follow-up.    If you have questions about hydroxychloroquine or the information in this letter, please call the clinic at [phone] or talk to your provider at your next office  visit.                        1    Eye Specialist Letter  Dear Eye Specialist,  To ensure safe use of Plaquenil (hydroxychloroquine), we are requesting your assistance in providing the following information. Please return this form to our clinic via mail or fax, or incorporate this information into your visit summary. Your note must indicate whether or not there is evidence of toxicity from Plaquenil use. For questions regarding this form, please call [phone].  Patient Name:   :             Date of Exam:    The following exams were completed during this visit in accordance with the American Academy of Ophthalmology recommendations (2016):  ? 10-2 automated visual field  ? Spectral-domain optical coherence tomography (SD OCT)  ? Multifocal electroretinogram (mfERG)  ? Fundus autofluorescence (FAF)  ? Other (please specify)  Please select from the following:  ? The findings from the above exams are not suggestive of toxicity from Plaquenil (hydroxychloroquine).  ? The findings from the above exams may suggest toxicity from Plaquenil (hydroxychloroquine).  Directly contact the clinic and fax this form.  Please provide additional guidance on whether or not the medication may be continued from your perspective:  Date of next recommended Plaquenil (hydroxychloroquine) screening eye exam:   ? 5 years  ? 1 year  ? 6 months  Other (please specify):   Eye Specialist Name (print):                                                                Date:  Eye Specialist Signature:

## 2020-07-02 RX ORDER — HYDROXYCHLOROQUINE SULFATE 200 MG/1
200 TABLET, FILM COATED ORAL DAILY
Qty: 130 TABLET | Refills: 3 | Status: SHIPPED | OUTPATIENT
Start: 2020-07-02 | End: 2020-10-14

## 2020-07-02 NOTE — TELEPHONE ENCOUNTER
Plaquenil    Take 1 tablet (200 mg) by mouth daily 300 mg/day (1.5 pills) Annual Plaquenil toxicity eye screening required  Last Written Prescription Date:  6/10/2019  Last Fill Quantity: 130,   # refills: 3  Last Office Visit: 12/18/2019  Future Office visit: none  Last Eye Exam: 5/7/2019    Routing refill request to provider for review/approval because:     Eye exam letter sent.  Greater than 6 months since last appt.( [place]armad with Dr Mccabe).  Scheduling has been notified to contact the pt for appointment.

## 2020-08-05 ENCOUNTER — TELEPHONE (OUTPATIENT)
Dept: RHEUMATOLOGY | Facility: CLINIC | Age: 40
End: 2020-08-05

## 2020-08-05 NOTE — TELEPHONE ENCOUNTER
[facility] Call Center    Phone Message    May a detailed message be left on voicemail: yes     Reason for Call: Order(s): Other:   Reason for requested: labs for upcoming visit  Date needed: before 10.14.20  Provider name: Niall  The pt is on the waiting list so may get in sooner.      Action Taken: Message routed to:  [facility]    Travel Screening: Not Applicable

## 2020-08-14 ENCOUNTER — OFFICE VISIT (OUTPATIENT)
Dept: OPHTHALMOLOGY | Facility: CLINIC | Age: 40
End: 2020-08-14
Attending: OPHTHALMOLOGY
Payer: COMMERCIAL

## 2020-08-14 DIAGNOSIS — Z79.899 ENCOUNTER FOR LONG-TERM (CURRENT) USE OF HIGH-RISK MEDICATION: ICD-10-CM

## 2020-08-14 PROCEDURE — G0463 HOSPITAL OUTPT CLINIC VISIT: HCPCS | Mod: ZF

## 2020-08-14 PROCEDURE — 92134 CPTRZ OPH DX IMG PST SGM RTA: CPT | Mod: ZF | Performed by: OPHTHALMOLOGY

## 2020-08-14 PROCEDURE — 92250 FUNDUS PHOTOGRAPHY W/I&R: CPT | Mod: ZF | Performed by: OPHTHALMOLOGY

## 2020-08-14 PROCEDURE — 92082 INTERMEDIATE VISUAL FIELD XM: CPT | Mod: ZF | Performed by: OPHTHALMOLOGY

## 2020-08-14 ASSESSMENT — SLIT LAMP EXAM - LIDS
COMMENTS: NORMAL
COMMENTS: NORMAL

## 2020-08-14 ASSESSMENT — VISUAL ACUITY
OS_SC: 20/20
OD_SC: 20/30
METHOD: SNELLEN - LINEAR
OD_SC+: -2

## 2020-08-14 ASSESSMENT — TONOMETRY
IOP_METHOD: ICARE
OD_IOP_MMHG: 11
OS_IOP_MMHG: 11

## 2020-08-14 ASSESSMENT — CONF VISUAL FIELD
OS_NORMAL: 1
METHOD: COUNTING FINGERS
OD_NORMAL: 1

## 2020-08-14 ASSESSMENT — CUP TO DISC RATIO
OD_RATIO: 0.1
OS_RATIO: 0.1

## 2020-08-14 ASSESSMENT — EXTERNAL EXAM - RIGHT EYE: OD_EXAM: NORMAL

## 2020-08-14 ASSESSMENT — EXTERNAL EXAM - LEFT EYE: OS_EXAM: NORMAL

## 2020-08-14 NOTE — NURSING NOTE
Chief Complaints and History of Present Illnesses   Patient presents with     Follow Up     Encounter for long-term (current) use of high-risk medication     Chief Complaint(s) and History of Present Illness(es)     Follow Up     Associated symptoms: Negative for eye pain, flashes, floaters, photophobia, headache and abnormal color vision    Comments: Encounter for long-term (current) use of high-risk medication              Comments     Pt states VA is stable since last visit.  Pt has no pain or other concerns.  Pt is currently taking 300 mg of Plaquenil a day since 2018.  Pt did try 200 mg a day shortly after last appointment in May 2019 but developed symptoms.  Pt has no pain or other concerns at this time.    ADELITA Elias August 14, 2020 1:53 PM

## 2020-08-14 NOTE — PROGRESS NOTES
CC -   PLAQUENIL     INTERVAL HISTORY - No change    HPI -   Chelsea Stein is a  40 year old year-old patient referred by Dr Westfall for evaluation and treat of plaquenil use for lupus. She is taking 300mg daily since   Plaquenil usage 400 mg/day 9595-4286, then 300 mg/day since 2018  Was on/off for ~ 10 years prior to 2012 with intermittent use for few months at time during few flares  Weight 108#, no renal disease, no tamoxifen  Has been getting regular monitoring overall in [facility] & [place]    Clinical psychologist    PAST OCULAR SURGERY  None    RETINAL IMAGING:  OCT 8-14-20  OD - retina normal, ?PHF attached  OS - retina normal, ?PHF attached    AF 8-14-20  OD - normal  OS - normal    OVF 10-2  8-14-20  OD - reliable, few spots on pattern  OS - reliable, few spots not on pattern        ASSESSMENT & PLAN    1. Plaquenil use   - 400 mg/day 4079-3623, 300 mg/day since 2018   - no renal, 108#      - no likely toxixicty   - few missed spots on OVF 10-2   - plan mfERG (may have had done in [place]) in 1 year   - recheck 1 year     - dosage is slightly higher than 5 mg/kg, consider reducing to 200 mg/day if tolerated   - failed in past      2. Vitreous syneresis OU   - S/Sx RD d/w patient 5/2019        return to clinic: 1 year, OCT/FAF/OVF 10-2, mfERG    ATTESTATION     Attending Physician Attestation:      Complete documentation of historical and exam elements from today's encounter can be found in the full encounter summary report (not reduplicated in this progress note).  I personally obtained the chief complaint(s) and history of present illness.  I confirmed and edited as necessary the review of systems, past medical/surgical history, family history, social history, and examination findings as documented by others; and I examined the patient myself.  I personally reviewed the relevant tests, images, and reports as documented above.  I formulated and edited as necessary the assessment and plan and discussed the  findings and management plan with the patient and family    Clare Montano MD, PhD  , Vitreoretinal Surgery  Department of Ophthalmology  [facility]

## 2020-09-22 ENCOUNTER — MYC REFILL (OUTPATIENT)
Dept: RHEUMATOLOGY | Facility: CLINIC | Age: 40
End: 2020-09-22

## 2020-09-22 DIAGNOSIS — M32.9 SYSTEMIC LUPUS COMPLICATING PREGNANCY (H): ICD-10-CM

## 2020-09-22 DIAGNOSIS — O99.891 SYSTEMIC LUPUS COMPLICATING PREGNANCY (H): ICD-10-CM

## 2020-09-22 RX ORDER — HYDROXYCHLOROQUINE SULFATE 200 MG/1
200 TABLET, FILM COATED ORAL DAILY
Qty: 130 TABLET | Refills: 3 | Status: CANCELLED | OUTPATIENT
Start: 2020-09-22

## 2020-10-08 ENCOUNTER — VIRTUAL VISIT (OUTPATIENT)
Dept: FAMILY MEDICINE | Facility: CLINIC | Age: 40
End: 2020-10-08
Payer: COMMERCIAL

## 2020-10-08 DIAGNOSIS — L85.3 XEROSIS CUTIS: Primary | ICD-10-CM

## 2020-10-08 PROCEDURE — 99203 OFFICE O/P NEW LOW 30 MIN: CPT | Mod: 95 | Performed by: INTERNAL MEDICINE

## 2020-10-08 RX ORDER — TRIAMCINOLONE ACETONIDE 1 MG/G
CREAM TOPICAL 2 TIMES DAILY
Qty: 30 G | Refills: 1 | Status: SHIPPED | OUTPATIENT
Start: 2020-10-08 | End: 2021-08-27

## 2020-10-08 NOTE — PROGRESS NOTES
"Chelsea Stein is a 40 year old female who is being evaluated via a billable video visit.      The patient has been notified of following:     \"This video visit will be conducted via a call between you and your physician/provider. We have found that certain health care needs can be provided without the need for an in-person physical exam.  This service lets us provide the care you need with a video conversation.  If a prescription is necessary we can send it directly to your pharmacy.  If lab work is needed we can place an order for that and you can then stop by our lab to have the test done at a later time.    Video visits are billed at different rates depending on your insurance coverage.  Please reach out to your insurance provider with any questions.    If during the course of the call the physician/provider feels a video visit is not appropriate, you will not be charged for this service.\"    Patient has given verbal consent for Video visit? Yes  How would you like to obtain your AVS? MyChart  If you are dropped from the video visit, the video invite should be resent to: Text to cell phone: [phone]  Will anyone else be joining your video visit? No      Subjective     Chelsea Stein is a 40 year old female who presents today via video visit for the following health issues:    HPI     New Patient/Transfer of Care       Video Start Time: 1:19 PM    New Patient/Transfer of Care    Review of Systems   Constitutional, HEENT, cardiovascular, pulmonary, gi and gu systems are negative, except as otherwise noted.      Objective     [place], and her bathing suit for long periods of time she noticed a maculopapular erythematous rash on both cheeks of her buttocks.  She tried over-the-counter steroid cream with no significant improvement.  The rash is not pruritic, painful or changing in distribution.  There were no initial vesicles.    The patient bathes with a " shower gel and/or Dove for soft sensitive skin.  No lotions or creams      Vitals:  No vitals were obtained today due to virtual visit.    Physical Exam     GENERAL: Healthy, alert and no distress  EYES: Eyes grossly normal to inspection.  No discharge or erythema, or obvious scleral/conjunctival abnormalities.  RESP: No audible wheeze, cough, or visible cyanosis.  No visible retractions or increased work of breathing.    SKIN: Visible skin clear. No significant rash, abnormal pigmentation or lesions.  NEURO: Cranial nerves grossly intact.  Mentation and speech appropriate for age.  PSYCH: Mentation appears normal, affect normal/bright, judgement and insight intact, normal speech and appearance well-groomed.  Skin bilateral macular erythematous papular rash with minimal excoriation on both buttocks            Assessment & Plan     There are no diagnoses linked to this encount   1. Xerosis cutis    Recommend eliminating the shower gel and just using Dove sensitive skin moisturizing her skin with Eucerin cream daily and after showering to use Eucerin cream mixed equally with triamcinolone cream.  Patient will send me a follow-up picture on my chart to reevaluate progress in 4 days.  - triamcinolone (KENALOG) 0.1 % external cream; Apply topically 2 times daily  Dispense: 30 g; Refill: 1      FUTURE APPOINTMENTS:       - Follow-up visit in 4 d via my chart    No follow-ups on file.    Jeff Burden MD  [facility]      Video-Visit Details    Type of service:  Video Visit    Video End Time:1:34 PM    Originating Location (pt. Location): Home    Distant Location (provider location):  [facility]     Platform used for Video Visit: Yanet

## 2020-10-14 ENCOUNTER — VIRTUAL VISIT (OUTPATIENT)
Dept: RHEUMATOLOGY | Facility: CLINIC | Age: 40
End: 2020-10-14
Attending: STUDENT IN AN ORGANIZED HEALTH CARE EDUCATION/TRAINING PROGRAM
Payer: COMMERCIAL

## 2020-10-14 DIAGNOSIS — M32.9 SYSTEMIC LUPUS COMPLICATING PREGNANCY (H): ICD-10-CM

## 2020-10-14 DIAGNOSIS — L65.9 HAIR THINNING: ICD-10-CM

## 2020-10-14 DIAGNOSIS — R63.4 WEIGHT LOSS: Primary | ICD-10-CM

## 2020-10-14 DIAGNOSIS — O99.891 SYSTEMIC LUPUS COMPLICATING PREGNANCY (H): ICD-10-CM

## 2020-10-14 PROCEDURE — 99214 OFFICE O/P EST MOD 30 MIN: CPT | Mod: 95 | Performed by: STUDENT IN AN ORGANIZED HEALTH CARE EDUCATION/TRAINING PROGRAM

## 2020-10-14 RX ORDER — HYDROXYCHLOROQUINE SULFATE 200 MG/1
200 TABLET, FILM COATED ORAL DAILY
Qty: 90 TABLET | Refills: 1 | Status: SHIPPED | OUTPATIENT
Start: 2020-10-14 | End: 2020-10-15

## 2020-10-14 ASSESSMENT — PAIN SCALES - GENERAL: PAINLEVEL: NO PAIN (0)

## 2020-10-14 NOTE — PATIENT INSTRUCTIONS
Great to see you today Chelsea!    Please stop by any [place] lab to have the following labs drawn:  CBC w/differential  Serum creatinine  Urine Analysis  Urine protein-to-creatine ratio  Double stranded DNA  Complement C3  Complement C4  CRP  Iron and iron binding capacity  Ferritin  Vitamin D    I have also placed a referral to nutrition to help with healthy weight gain.    Once your labs return, if there is no systemic sign of lupus activity, I will refer you to dermatology to help with skin rash.    Please continue your plaquenil daily, I have refilled that today.    See you in 6 months but please contact me with any symptom changes or worsening. If labs are troubling, I will have your next clinic visit moved up.    Dr. JONES

## 2020-10-14 NOTE — PROGRESS NOTES
Left voicemail for patient to call back to set up telemedicine visit, will call again before appointment time.  Deena Higgins CMA on 10/14/2020 at 9:54 AM

## 2020-10-14 NOTE — PROGRESS NOTES
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 Tiffany Parkinson) and [facility] (Dr. Conde) and was pregnant with 2nd child at initial visit (10/15/18). More recently she is now s/p 2 pregnancies without any lupus related complications. She was diagnosed at 18 yo initially with raynaud's then in her 20s had a full flare and was diagnosed with lupus. She has not required steroids since 2016 though her disease is very steroid responsive. C4 levels and dsDNA levels do correspond to flares per patient. Before she moved her persistent neutropenia was evaluated via BM biopsy which was unremarkable.    Interval history (10/14/2020): Has not had a return of various joint aches but has been feeling fatigued, cannot gain weight despite increasing her calories, and has been having more skin flares with a rash developing on her cheeks and lasting about a week. Currently no rash. No oral lesions. But she has been having hair loss.    ROS: A comprehensive 14 point ROS was done. Positives are per HPI.    Allergies   Allergen Reactions     Benadryl [Diphenhydramine] Other (See Comments)     Sedation and feel like dying     Seasonal Allergies      Other reaction(s): Other: See Comments  Congestion        Current Outpatient Medications   Medication     dicloxacillin (DYNAPEN) 500 MG capsule     Ferrous Sulfate (IRON SUPPLEMENT PO)     hydroxychloroquine (PLAQUENIL) 200 MG tablet     ibuprofen (ADVIL/MOTRIN) 600 MG tablet     Prenatal Vit-Fe Fumarate-FA (PRENATAL MULTIVITAMIN PLUS IRON) 27-0.8 MG TABS per tablet     triamcinolone (KENALOG) 0.1 % external cream     No current facility-administered medications for this visit.      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 Patient reports weight has stagnated at 103 lb  General: NAD, very pleasant, very thin  HEENT: face symmetric, mild dry mucosa, lacrimal pool adequate, no oral lesions  Lymph: no submandibular, cervical, supraclavicular LAD  CV: rrr, no mrg  Resp: CTAB  Abd: nt, nd  MSK: no weakness, gait wnl, full active ROM of all joints without pain  Skin: no rashes, no malar rash, no petechia, no bruising  Psych: congruent mood and affect    Lab Results   Component Value Date/Time    DNA 3 12/16/2019 10:40 AM    DNA 3 10/16/2019 10:03 AM    DNA 2 07/10/2019 01:51 PM     RHEUM RESULTS Latest Ref Rng & Units 7/10/2019 10/16/2019 12/16/2019   COMPLEMENT C3 76 - 169 mg/dL 69(L) 72(L) 81   COMPLEMENT C4 15 - 50 mg/dL 14(L) 15 17   DNA-DS <10 IU/mL 2 3 3   SED RATE 0 - 20 mm/h - - -   CRP, INFLAMMATION 0.0 - 8.0 mg/L - - -   AST 0 - 45 U/L - - -   ALT 0 - 50 U/L - - -   ALBUMIN 3.4 - 5.0 g/dL - - -   WBC 4.0 - 11.0 10e9/L 2.0(L) 1.6(LL) 1.8(LL)   RBC 3.8 - 5.2 10e12/L 4.19 4.25 4.18   HGB 11.7 - 15.7 g/dL 13.1 13.2 13.1   HCT 35.0 - 47.0 % 38.7 38.9 38.1   MCV 78 - 100 fl 92 92 91   MCHC 31.5 - 36.5 g/dL 33.9 33.9 34.4   RDW 10.0 - 15.0 % 13.0 12.4 12.5    - 450 10e9/L 198 167 201   CREATININE 0.52 - 1.04 mg/dL 0.69 - -   GFR ESTIMATE, IF BLACK >60 mL/min/[1.73:m2] >90 - -   GFR ESTIMATE >60 mL/min/[1.73:m2] >90 - -       Assessment:  39 yo F with well lupus on plaquenil s/p healthy second pregnancy. Considering her significant post-partum weight loss, long period of plaquenil use, and stability of disease, we had decreased her plaquenil to 200 mg daily at last visit in June. She began to experience mild lupus flares in October consisting of fatigue and joint pain responsive to steroids. Plaquenil was increase back to 300 mg (6 mg/kg) daily again in October. Current fatigue, inability to gain weight, and facial rash is concerning for  mild to moderate flare. Will need repeat labs to assess extent of flare to allow for appropriate treatment choices.     1. Butterfly rashes, fatigue, hair loss, inability to gain weight concerning for flare  - labs today: C3, C4, dsDNA, CRP, U Pr/Cr, UA  - if evidence of systemic involvement would consider low dose prednisone v. Imuran  - if no evidence of systemic involvement, would refer to dermatology to assist with topicals  - referral to nutrition to assist with healthy weight gain    2. Mild proteinuria  - was not repeated previously. Will be repeated today. If persistently elevated, will refer to nephrology    3. Persistent leukopenia, specifically low neutrophils  - has previously had bone marrow biopsy that was not concerning  - stable    Diagnosis:  1. SLE (MAURICIO+ 1:1280 speckled, Sm+, RNP+, SSA+, Chromatin Ab+, APS negative, low C4 which corresponds to flares as does dsDNA per patient, with raynaud's, arthralgias, malar rash, mild alopecia, oral ulcers, photosensitivity, neutropenia)  2. Sicca symptoms (dry mouth)  3. Drug monitoring: Plaquenil long term use    Plan:  1. continue plaquenil 300 mg daily.   Plaquenil started at 18 yo   Last eye exam: 8/2020  3. Labs today: C3, C4, dsDNA, U Pr/Cr, CRP  4. Refer to nutrition    RTC in 6 months    Patient staffed with Dr. Humble Cartagena MD, PhD  Rheumatology Fellow    Staff addendum  I performed the history and physical examination of the patient and discussed the management with the fellow. I reviewed the available lab and imaging studies. I reviewed the fellow's note and agree with the documented findings and plan of care.    Saravanan Kate MD  Rheumatology

## 2020-10-14 NOTE — PROGRESS NOTES
"Chelsea Stein is a 40 year old female who is being evaluated via a billable video visit.      The patient has been notified of following:     \"This video visit will be conducted via a call between you and your physician/provider. We have found that certain health care needs can be provided without the need for an in-person physical exam.  This service lets us provide the care you need with a video conversation.  If a prescription is necessary we can send it directly to your pharmacy.  If lab work is needed we can place an order for that and you can then stop by our lab to have the test done at a later time.    Video visits are billed at different rates depending on your insurance coverage.  Please reach out to your insurance provider with any questions.    If during the course of the call the physician/provider feels a video visit is not appropriate, you will not be charged for this service.\"    Patient has given verbal consent for Video visit? Yes  How would you like to obtain your AVS? MyChart  If you are dropped from the video visit, the video invite should be resent to: Text to cell phone: [phone]  Will anyone else be joining your video visit? No        Video-Visit Details    Type of service:  Video Visit    Video Start Time: 10:30 AM  Video End Time: 11:00 AM    Originating Location (pt. Location): Home    Distant Location (provider location):  [facility] [place]     Platform used for Video Visit: Marla Cartagena MD, PhD        "

## 2020-10-14 NOTE — LETTER
10/14/2020       RE: Chelsea Stein  [address]  [place] [place] [facility] [address]     Dear Colleague,    Thank you for referring your patient, Chelsea Stein, to the [facility] [place] [facility]. Please see a copy of my visit note below.    Left voicemail for patient to call back to set up telemedicine visit, will call again before appointment time.  Deena Higgins, CMA on 10/14/2020 at 9:54 AM      [facility]  Date: 10/14/2020  CC: lupus    Patient Summary: 41 yo F with SSA+ SLE who transfered her care from [facility] (Dr. Tiffany Parkinson) and [facility] (Dr. Conde) and was pregnant with 2nd child at initial visit (10/15/18). More recently she is now s/p 2 pregnancies without any lupus related complications. She was diagnosed at 18 yo initially with raynaud's then in her 20s had a full flare and was diagnosed with lupus. She has not required steroids since 2016 though her disease is very steroid responsive. C4 levels and dsDNA levels do correspond to flares per patient. Before she moved her persistent neutropenia was evaluated via BM biopsy which was unremarkable.    Interval history (10/14/2020): Has not had a return of various joint aches but has been feeling fatigued, cannot gain weight despite increasing her calories, and has been having more skin flares with a rash developing on her cheeks and lasting about a week. Currently no rash. No oral lesions. But she has been having hair loss.    ROS: A comprehensive 14 point ROS was done. Positives are per HPI.    Allergies   Allergen Reactions     Benadryl [Diphenhydramine] Other (See Comments)     Sedation and feel like dying     Seasonal Allergies      Other reaction(s): Other: See Comments  Congestion        Current Outpatient Medications   Medication     dicloxacillin (DYNAPEN) 500 MG capsule     Ferrous Sulfate (IRON SUPPLEMENT PO)     hydroxychloroquine  Addended by: Ceci Hopper on: 5/17/2018 03:58 PM     Modules accepted: Orders (PLAQUENIL) 200 MG tablet     ibuprofen (ADVIL/MOTRIN) 600 MG tablet     Prenatal Vit-Fe Fumarate-FA (PRENATAL MULTIVITAMIN PLUS IRON) 27-0.8 MG TABS per tablet     triamcinolone (KENALOG) 0.1 % external cream     No current facility-administered medications for this visit.      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 Patient reports weight has stagnated at 103 lb  General: NAD, very pleasant, very thin  HEENT: face symmetric, mild dry mucosa, lacrimal pool adequate, no oral lesions  Lymph: no submandibular, cervical, supraclavicular LAD  CV: rrr, no mrg  Resp: CTAB  Abd: nt, nd  MSK: no weakness, gait wnl, full active ROM of all joints without pain  Skin: no rashes, no malar rash, no petechia, no bruising  Psych: congruent mood and affect    Lab Results   Component Value Date/Time    DNA 3 2019 10:40 AM    DNA 3 10/16/2019 10:03 AM    DNA 2 07/10/2019 01:51 PM     RHEUM RESULTS Latest Ref Rng & Units 7/10/2019 10/16/2019 2019   COMPLEMENT C3 76 - 169 mg/dL 69(L) 72(L) 81   COMPLEMENT C4 15 - 50 mg/dL 14(L) 15 17   DNA-DS <10 IU/mL 2 3 3   SED RATE 0 - 20 mm/h - - -   CRP, INFLAMMATION 0.0 - 8.0 mg/L - - -   AST 0 - 45 U/L - - -   ALT 0 - 50 U/L - - -   ALBUMIN 3.4 - 5.0 g/dL - - -   WBC 4.0 - 11.0 10e9/L 2.0(L) 1.6(LL) 1.8(LL)   RBC 3.8 - 5.2 10e12/L 4.19 4.25 4.18   HGB 11.7 - 15.7 g/dL 13.1 13.2 13.1   HCT 35.0 - 47.0 % 38.7 38.9 38.1   MCV 78 - 100 fl 92 92 91   MCHC 31.5 - 36.5 g/dL 33.9 33.9 34.4   RDW 10.0 - 15.0 % 13.0 12.4 12.5    - 450 10e9/L 198 167 201   CREATININE 0.52 - 1.04 mg/dL 0.69 - -   GFR ESTIMATE, IF BLACK >60 mL/min/[1.73:m2] >90 - -   GFR ESTIMATE >60 mL/min/[1.73:m2] >90 - -       Assessment:  41 yo F with well lupus on plaquenil  s/p healthy second pregnancy. Considering her significant post-partum weight loss, long period of plaquenil use, and stability of disease, we had decreased her plaquenil to 200 mg daily at last visit in June. She began to experience mild lupus flares in October consisting of fatigue and joint pain responsive to steroids. Plaquenil was increase back to 300 mg (6 mg/kg) daily again in October. Current fatigue, inability to gain weight, and facial rash is concerning for mild to moderate flare. Will need repeat labs to assess extent of flare to allow for appropriate treatment choices.     1. Butterfly rashes, fatigue, hair loss, inability to gain weight concerning for flare  - labs today: C3, C4, dsDNA, CRP, U Pr/Cr, UA  - if evidence of systemic involvement would consider low dose prednisone v. Imuran  - if no evidence of systemic involvement, would refer to dermatology to assist with topicals  - referral to nutrition to assist with healthy weight gain    2. Mild proteinuria  - was not repeated previously. Will be repeated today. If persistently elevated, will refer to nephrology    3. Persistent leukopenia, specifically low neutrophils  - has previously had bone marrow biopsy that was not concerning  - stable    Diagnosis:  1. SLE (MAURICIO+ 1:1280 speckled, Sm+, RNP+, SSA+, Chromatin Ab+, APS negative, low C4 which corresponds to flares as does dsDNA per patient, with raynaud's, arthralgias, malar rash, mild alopecia, oral ulcers, photosensitivity, neutropenia)  2. Sicca symptoms (dry mouth)  3. Drug monitoring: Plaquenil long term use    Plan:  1. continue plaquenil 300 mg daily.   Plaquenil started at 18 yo   Last eye exam: 8/2020  3. Labs today: C3, C4, dsDNA, U Pr/Cr, CRP  4. Refer to nutrition    RTC in 6 months    Patient staffed with Dr. Humble Cartagena MD, PhD  Rheumatology Fellow    Staff addendum  I performed the history and physical examination of the patient and discussed the management with  "the fellow. I reviewed the available lab and imaging studies. I reviewed the fellow's note and agree with the documented findings and plan of care.    Saravanan Kate MD  Rheumatology      Chelsea Stein is a 40 year old female who is being evaluated via a billable video visit.      The patient has been notified of following:     \"This video visit will be conducted via a call between you and your physician/provider. We have found that certain health care needs can be provided without the need for an in-person physical exam.  This service lets us provide the care you need with a video conversation.  If a prescription is necessary we can send it directly to your pharmacy.  If lab work is needed we can place an order for that and you can then stop by our lab to have the test done at a later time.    Video visits are billed at different rates depending on your insurance coverage.  Please reach out to your insurance provider with any questions.    If during the course of the call the physician/provider feels a video visit is not appropriate, you will not be charged for this service.\"    Patient has given verbal consent for Video visit? Yes  How would you like to obtain your AVS? MyChart  If you are dropped from the video visit, the video invite should be resent to: Text to cell phone: [phone]  Will anyone else be joining your video visit? No        Video-Visit Details    Type of service:  Video Visit    Video Start Time: 10:30 AM  Video End Time: 11:00 AM    Originating Location (pt. Location): Home    Distant Location (provider location):  [facility] [place]     Platform used for Video Visit: Marla Cartagena MD, PhD        "

## 2020-10-15 DIAGNOSIS — M32.9 SYSTEMIC LUPUS COMPLICATING PREGNANCY (H): ICD-10-CM

## 2020-10-15 DIAGNOSIS — O99.891 SYSTEMIC LUPUS COMPLICATING PREGNANCY (H): ICD-10-CM

## 2020-10-15 RX ORDER — HYDROXYCHLOROQUINE SULFATE 200 MG/1
200 TABLET, FILM COATED ORAL DAILY
Qty: 90 TABLET | Refills: 1 | Status: CANCELLED | OUTPATIENT
Start: 2020-10-15

## 2020-10-15 RX ORDER — HYDROXYCHLOROQUINE SULFATE 200 MG/1
TABLET, FILM COATED ORAL
Qty: 135 TABLET | Refills: 1 | Status: SHIPPED | OUTPATIENT
Start: 2020-10-15 | End: 2021-06-02

## 2020-10-19 DIAGNOSIS — M32.9 SYSTEMIC LUPUS COMPLICATING PREGNANCY (H): ICD-10-CM

## 2020-10-19 DIAGNOSIS — M32.9 SYSTEMIC LUPUS ERYTHEMATOSUS, UNSPECIFIED SLE TYPE, UNSPECIFIED ORGAN INVOLVEMENT STATUS (H): ICD-10-CM

## 2020-10-19 DIAGNOSIS — O99.891 SYSTEMIC LUPUS COMPLICATING PREGNANCY (H): ICD-10-CM

## 2020-10-19 DIAGNOSIS — L65.9 HAIR THINNING: ICD-10-CM

## 2020-10-19 LAB
ALBUMIN UR-MCNC: NEGATIVE MG/DL
APPEARANCE UR: CLEAR
BASOPHILS # BLD AUTO: 0 10E9/L (ref 0–0.2)
BASOPHILS NFR BLD AUTO: 0.4 %
BILIRUB UR QL STRIP: NEGATIVE
COLOR UR AUTO: YELLOW
CRP SERPL-MCNC: <2.9 MG/L (ref 0–8)
DIFFERENTIAL METHOD BLD: ABNORMAL
EOSINOPHIL # BLD AUTO: 0 10E9/L (ref 0–0.7)
EOSINOPHIL NFR BLD AUTO: 0.4 %
ERYTHROCYTE [DISTWIDTH] IN BLOOD BY AUTOMATED COUNT: 12 % (ref 10–15)
ERYTHROCYTE [SEDIMENTATION RATE] IN BLOOD BY WESTERGREN METHOD: 11 MM/H (ref 0–20)
GLUCOSE UR STRIP-MCNC: NEGATIVE MG/DL
HCT VFR BLD AUTO: 39.6 % (ref 35–47)
HGB BLD-MCNC: 13.8 G/DL (ref 11.7–15.7)
HGB UR QL STRIP: NEGATIVE
KETONES UR STRIP-MCNC: NEGATIVE MG/DL
LEUKOCYTE ESTERASE UR QL STRIP: NEGATIVE
LYMPHOCYTES # BLD AUTO: 1 10E9/L (ref 0.8–5.3)
LYMPHOCYTES NFR BLD AUTO: 42.6 %
MCH RBC QN AUTO: 31.9 PG (ref 26.5–33)
MCHC RBC AUTO-ENTMCNC: 34.8 G/DL (ref 31.5–36.5)
MCV RBC AUTO: 92 FL (ref 78–100)
MONOCYTES # BLD AUTO: 0.3 10E9/L (ref 0–1.3)
MONOCYTES NFR BLD AUTO: 11.9 %
NEUTROPHILS # BLD AUTO: 1.1 10E9/L (ref 1.6–8.3)
NEUTROPHILS NFR BLD AUTO: 44.7 %
NITRATE UR QL: NEGATIVE
NON-SQ EPI CELLS #/AREA URNS LPF: NORMAL /LPF
PH UR STRIP: 6 PH (ref 5–7)
PLATELET # BLD AUTO: 183 10E9/L (ref 150–450)
PROT UR-MCNC: 0.06 G/L
PROT/CREAT 24H UR: 0.13 G/G CR (ref 0–0.2)
RBC # BLD AUTO: 4.33 10E12/L (ref 3.8–5.2)
RBC #/AREA URNS AUTO: NORMAL /HPF
SOURCE: NORMAL
SP GR UR STRIP: 1.01 (ref 1–1.03)
UROBILINOGEN UR STRIP-ACNC: 0.2 EU/DL (ref 0.2–1)
WBC # BLD AUTO: 2.4 10E9/L (ref 4–11)
WBC #/AREA URNS AUTO: NORMAL /HPF

## 2020-10-19 PROCEDURE — 84460 ALANINE AMINO (ALT) (SGPT): CPT | Performed by: INTERNAL MEDICINE

## 2020-10-19 PROCEDURE — 84450 TRANSFERASE (AST) (SGOT): CPT | Performed by: INTERNAL MEDICINE

## 2020-10-19 PROCEDURE — 86140 C-REACTIVE PROTEIN: CPT | Performed by: INTERNAL MEDICINE

## 2020-10-19 PROCEDURE — 36415 COLL VENOUS BLD VENIPUNCTURE: CPT | Performed by: INTERNAL MEDICINE

## 2020-10-19 PROCEDURE — 86225 DNA ANTIBODY NATIVE: CPT | Performed by: INTERNAL MEDICINE

## 2020-10-19 PROCEDURE — 83540 ASSAY OF IRON: CPT | Performed by: INTERNAL MEDICINE

## 2020-10-19 PROCEDURE — 83550 IRON BINDING TEST: CPT | Performed by: INTERNAL MEDICINE

## 2020-10-19 PROCEDURE — 81001 URINALYSIS AUTO W/SCOPE: CPT | Performed by: INTERNAL MEDICINE

## 2020-10-19 PROCEDURE — 85652 RBC SED RATE AUTOMATED: CPT | Performed by: INTERNAL MEDICINE

## 2020-10-19 PROCEDURE — 82040 ASSAY OF SERUM ALBUMIN: CPT | Performed by: INTERNAL MEDICINE

## 2020-10-19 PROCEDURE — 82565 ASSAY OF CREATININE: CPT | Performed by: INTERNAL MEDICINE

## 2020-10-19 PROCEDURE — 85025 COMPLETE CBC W/AUTO DIFF WBC: CPT | Performed by: INTERNAL MEDICINE

## 2020-10-19 PROCEDURE — 82306 VITAMIN D 25 HYDROXY: CPT | Performed by: INTERNAL MEDICINE

## 2020-10-19 PROCEDURE — 86160 COMPLEMENT ANTIGEN: CPT | Performed by: INTERNAL MEDICINE

## 2020-10-19 PROCEDURE — 84156 ASSAY OF PROTEIN URINE: CPT | Performed by: INTERNAL MEDICINE

## 2020-10-19 PROCEDURE — 82728 ASSAY OF FERRITIN: CPT | Performed by: INTERNAL MEDICINE

## 2020-10-20 LAB
ALBUMIN SERPL-MCNC: 3.9 G/DL (ref 3.4–5)
ALT SERPL W P-5'-P-CCNC: 35 U/L (ref 0–50)
AST SERPL W P-5'-P-CCNC: 30 U/L (ref 0–45)
C3 SERPL-MCNC: 88 MG/DL (ref 81–157)
C4 SERPL-MCNC: 17 MG/DL (ref 13–39)
CREAT SERPL-MCNC: 0.7 MG/DL (ref 0.52–1.04)
DEPRECATED CALCIDIOL+CALCIFEROL SERPL-MC: 31 UG/L (ref 20–75)
DSDNA AB SER-ACNC: 3 IU/ML
FERRITIN SERPL-MCNC: 27 NG/ML (ref 12–150)
GFR SERPL CREATININE-BSD FRML MDRD: >90 ML/MIN/{1.73_M2}
IRON SATN MFR SERPL: 24 % (ref 15–46)
IRON SERPL-MCNC: 88 UG/DL (ref 35–180)
TIBC SERPL-MCNC: 363 UG/DL (ref 240–430)

## 2020-10-29 ENCOUNTER — MYC MEDICAL ADVICE (OUTPATIENT)
Dept: RHEUMATOLOGY | Facility: CLINIC | Age: 40
End: 2020-10-29

## 2020-10-29 DIAGNOSIS — M32.9 SYSTEMIC LUPUS COMPLICATING PREGNANCY (H): Primary | ICD-10-CM

## 2020-10-29 DIAGNOSIS — O99.891 SYSTEMIC LUPUS COMPLICATING PREGNANCY (H): Primary | ICD-10-CM

## 2020-10-29 DIAGNOSIS — R21 RASH: ICD-10-CM

## 2020-11-19 ENCOUNTER — OFFICE VISIT (OUTPATIENT)
Dept: DERMATOLOGY | Facility: CLINIC | Age: 40
End: 2020-11-19
Payer: COMMERCIAL

## 2020-11-19 DIAGNOSIS — L73.9 FOLLICULITIS: Primary | ICD-10-CM

## 2020-11-19 DIAGNOSIS — M32.9 SYSTEMIC LUPUS ERYTHEMATOSUS, UNSPECIFIED SLE TYPE, UNSPECIFIED ORGAN INVOLVEMENT STATUS (H): ICD-10-CM

## 2020-11-19 DIAGNOSIS — L21.9 DERMATITIS, SEBORRHEIC: ICD-10-CM

## 2020-11-19 PROCEDURE — 99202 OFFICE O/P NEW SF 15 MIN: CPT | Mod: GC | Performed by: DERMATOLOGY

## 2020-11-19 RX ORDER — CLINDAMYCIN PHOSPHATE 10 UG/ML
LOTION TOPICAL 2 TIMES DAILY
Qty: 60 ML | Refills: 1 | Status: SHIPPED | OUTPATIENT
Start: 2020-11-19 | End: 2021-08-27

## 2020-11-19 RX ORDER — TACROLIMUS 1 MG/G
OINTMENT TOPICAL 2 TIMES DAILY
Qty: 60 G | Refills: 3 | Status: SHIPPED | OUTPATIENT
Start: 2020-11-19 | End: 2021-08-27

## 2020-11-19 ASSESSMENT — PAIN SCALES - GENERAL: PAINLEVEL: NO PAIN (0)

## 2020-11-19 NOTE — LETTER
11/19/2020       RE: Chelsea Stein  [address]  [facility] [address]     Dear Colleague,    Thank you for referring your patient, Chelsea Stein, to the [facility] [place] at [facility]. Please see a copy of my visit note below.    [facility] Dermatology Note      Dermatology Problem List:  1.Folliculitis, buttocks  - Current tx: Clindamycin 0.1% lotion BID until resolved, benzoyl peroxide wash daily  2. Seborrheic dermatitis  - Current tx: ketoconazole shampoo, Protopic 0.1% ointment BID PRN  3. SLE (MAURICIO+ 1:1280 speckled, Sm+, RNP+, SSA+, Chromatin Ab+, APS negative, low C4), follows with [facility] Rheumatology.   - Current tx: plaquenil 300 mg daily    Encounter Date: Nov 19, 2020    CC:   Chief Complaint   Patient presents with     Derm Problem     Patient is here today for a rash on buttock, and dry skin.      History of Present Illness:  Ms. Chelsea Stein is a 40 year old female with past medical history significant for systemic lupus erythematosus who presents as a referral from Dr. Latoya Beck (rheumatology) for evaluation of rash.    She first noticed a rash along her buttocks at the end of [place] She had a video visit with her PCP in October where she was started on triamcinolone 0.1% cream. She had no improvement in symptoms with this treatment. The rash is not painful or itchy, however she is more aware of the rash after sitting for prolonged periods of time or when the area is wet/sweaty. The papules do not bleed or drain. She does not have fevers, chills, or other acute symptoms.    In addition, she would like to discuss red and dry skin on her face. She does get a malar rash when she has flares of her SLE but notes that even when she does not have flares, the skin on her forehead and cheeks is pink, dry, and a little flaky. She  has some dandruff as well. She has been using an organic gentle moisturizer from [employer]. She does not wear cosmetics on a daily basis due to working remotely in the pandemic. She occasionally uses a dandruff shampoo. She is diligent about sun protection and wears sunscreen on a daily basis.    Past Medical History:   Patient Active Problem List   Diagnosis     Supervision of high-risk pregnancy of elderly multigravida     Systemic lupus erythematosus (H)     Cervical polyp     Leukopenia     History of  delivery, antepartum     Abdominal pain     Past Medical History:   Diagnosis Date     Lupus (systemic lupus erythematosus) (H)     SSA+     Past Surgical History:   Procedure Laterality Date      SECTION  2014     Social History:  Patient reports that she has never smoked. She has never used smokeless tobacco. She reports that she does not drink alcohol or use drugs. 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    Family History:  Family History   Problem Relation Age of Onset     Diabetes No family hx of      Glaucoma No family hx of      Macular Degeneration No family hx of        Medications:  Current Outpatient Medications   Medication Sig Dispense Refill     clindamycin (CLEOCIN T) 1 % external lotion Apply topically 2 times daily Until rash resolves 60 mL 1     hydroxychloroquine (PLAQUENIL) 200 MG tablet 300 mg/day (1.5 pills) Annual Plaquenil toxicity eye screening required. 135 tablet 1     tacrolimus (PROTOPIC) 0.1 % external ointment Apply topically 2 times daily 60 g 3     triamcinolone (KENALOG) 0.1 % external cream Apply topically 2 times daily 30 g 1     Ferrous Sulfate (IRON SUPPLEMENT PO) Take 65 mg by mouth daily       ibuprofen (ADVIL/MOTRIN) 600 MG tablet Take 1 tablet (600 mg) by mouth every 6 hours as needed for moderate pain (Patient not taking: Reported on 10/14/2020) 30 tablet 0     Prenatal Vit-Fe Fumarate-FA (PRENATAL MULTIVITAMIN PLUS IRON)  27-0.8 MG TABS per tablet Take 1 tablet by mouth daily          Allergies   Allergen Reactions     Benadryl [Diphenhydramine] Other (See Comments)     Sedation and feel like dying     Seasonal Allergies      Other reaction(s): Other: See Comments  Congestion       Review of Systems:  -As per HPI  -Constitutional: Otherwise feeling well today, in usual state of health.  -Skin: As above in HPI. No additional skin concerns.    Physical exam:  Vitals: There were no vitals taken for this visit.  GEN: This is a well developed, well-nourished female in no acute distress, in a pleasant mood.    SKIN: Focused examination of the face, scalp and buttocks was performed.  -Sutherland skin type: I-II  -There is macular erythema of the cheeks, forehead, and scalp with mild flaky white scale.  -Scattered papules and pustules on an erythematous base along central buttocks  -No other lesions of concern on areas examined.     Impression/Plan:    1. Folliculitis, buttocks  - Begin clindamycin 0.1% lotion BID until rash with OTC benzoyl peroxide wash daily; can continue benzoyl peroxide wash a few times per week after rash improves for maintenance    2. Seborrheic dermatitis  - Begin Protopic 0.1% ointment BID PRN to face. Continue OTC ketoconazole shampoo along scalp, face, eyebrows.     3. SLE, follows with rheumatology  - Continue plaquenil   - Sun precaution was advised including the use of sun screens of SPF 30 or higher, sun protective clothing, and HelioCare.    4. Family history of skin cancer  - Recommend follow up in 3-4 months for FBSE    CC Latoya Cartagena MD  [facility]  [address]  [place] [address] on close of this encounter.    Follow-up in 3 months, earlier for new or changing lesions.     Dr. Pineda staffed the patient.    Staff Involved:  Resident(Cherri Shea MD PGY-3 Family Medicine)/Staff    Staff Physician Comments:   I saw and evaluated the patient with the resident and I agree with  the assessment and plan.  I was present for the examination.    Florentin Pineda MD  Pronouns: he/him/his    Department of Dermatology  [facility]: Phone: [phone], Fax:[phone]  [facility]: Phone: [phone] Fax: [phone]

## 2020-11-19 NOTE — PROGRESS NOTES
[facility] Dermatology Note      Dermatology Problem List:  1.Folliculitis, buttocks  - Current tx: Clindamycin 0.1% lotion BID until resolved, benzoyl peroxide wash daily  2. Seborrheic dermatitis  - Current tx: ketoconazole shampoo, Protopic 0.1% ointment BID PRN  3. SLE (MAURICIO+ 1:1280 speckled, Sm+, RNP+, SSA+, Chromatin Ab+, APS negative, low C4), follows with [facility] Rheumatology.   - Current tx: plaquenil 300 mg daily    Encounter Date: Nov 19, 2020    CC:   Chief Complaint   Patient presents with     Derm Problem     Patient is here today for a rash on buttock, and dry skin.      History of Present Illness:  Ms. Chelsea Stein is a 40 year old female with past medical history significant for systemic lupus erythematosus who presents as a referral from Dr. Latoya Beck (rheumatology) for evaluation of rash.    She first noticed a rash along her buttocks at the end of [place] She had a video visit with her PCP in October where she was started on triamcinolone 0.1% cream. She had no improvement in symptoms with this treatment. The rash is not painful or itchy, however she is more aware of the rash after sitting for prolonged periods of time or when the area is wet/sweaty. The papules do not bleed or drain. She does not have fevers, chills, or other acute symptoms.    In addition, she would like to discuss red and dry skin on her face. She does get a malar rash when she has flares of her SLE but notes that even when she does not have flares, the skin on her forehead and cheeks is pink, dry, and a little flaky. She has some dandruff as well. She has been using an organic gentle moisturizer from [employer]. She does not wear cosmetics on a daily basis due to working remotely in the pandemic. She occasionally uses a dandruff shampoo. She is diligent about sun protection and wears sunscreen on a daily basis.    Past Medical  History:   Patient Active Problem List   Diagnosis     Supervision of high-risk pregnancy of elderly multigravida     Systemic lupus erythematosus (H)     Cervical polyp     Leukopenia     History of  delivery, antepartum     Abdominal pain     Past Medical History:   Diagnosis Date     Lupus (systemic lupus erythematosus) (H)     SSA+     Past Surgical History:   Procedure Laterality Date      SECTION  2014     Social History:  Patient reports that she has never smoked. She has never used smokeless tobacco. She reports that she does not drink alcohol or use drugs. 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    Family History:  Family History   Problem Relation Age of Onset     Diabetes No family hx of      Glaucoma No family hx of      Macular Degeneration No family hx of        Medications:  Current Outpatient Medications   Medication Sig Dispense Refill     clindamycin (CLEOCIN T) 1 % external lotion Apply topically 2 times daily Until rash resolves 60 mL 1     hydroxychloroquine (PLAQUENIL) 200 MG tablet 300 mg/day (1.5 pills) Annual Plaquenil toxicity eye screening required. 135 tablet 1     tacrolimus (PROTOPIC) 0.1 % external ointment Apply topically 2 times daily 60 g 3     triamcinolone (KENALOG) 0.1 % external cream Apply topically 2 times daily 30 g 1     Ferrous Sulfate (IRON SUPPLEMENT PO) Take 65 mg by mouth daily       ibuprofen (ADVIL/MOTRIN) 600 MG tablet Take 1 tablet (600 mg) by mouth every 6 hours as needed for moderate pain (Patient not taking: Reported on 10/14/2020) 30 tablet 0     Prenatal Vit-Fe Fumarate-FA (PRENATAL MULTIVITAMIN PLUS IRON) 27-0.8 MG TABS per tablet Take 1 tablet by mouth daily          Allergies   Allergen Reactions     Benadryl [Diphenhydramine] Other (See Comments)     Sedation and feel like dying     Seasonal Allergies      Other reaction(s): Other: See Comments  Congestion       Review of Systems:  -As per HPI  -Constitutional:  Otherwise feeling well today, in usual state of health.  -Skin: As above in HPI. No additional skin concerns.    Physical exam:  Vitals: There were no vitals taken for this visit.  GEN: This is a well developed, well-nourished female in no acute distress, in a pleasant mood.    SKIN: Focused examination of the face, scalp and buttocks was performed.  -Sutherland skin type: I-II  -There is macular erythema of the cheeks, forehead, and scalp with mild flaky white scale.  -Scattered papules and pustules on an erythematous base along central buttocks  -No other lesions of concern on areas examined.     Impression/Plan:    1. Folliculitis, buttocks  - Begin clindamycin 0.1% lotion BID until rash with OTC benzoyl peroxide wash daily; can continue benzoyl peroxide wash a few times per week after rash improves for maintenance    2. Seborrheic dermatitis  - Begin Protopic 0.1% ointment BID PRN to face. Continue OTC ketoconazole shampoo along scalp, face, eyebrows.     3. SLE, follows with rheumatology  - Continue plaquenil   - Sun precaution was advised including the use of sun screens of SPF 30 or higher, sun protective clothing, and HelioCare.    4. Family history of skin cancer  - Recommend follow up in 3-4 months for FBSE    CC Latoya Cartagena MD  [address]  [place] [address] on close of this encounter.    Follow-up in 3 months, earlier for new or changing lesions.     Dr. Pineda staffed the patient.    Staff Involved:  Resident(Cherri Shea MD PGY-3 Family Medicine)/Staff    Staff Physician Comments:   I saw and evaluated the patient with the resident and I agree with the assessment and plan.  I was present for the examination.    Florentin Pineda MD  Pronouns: he/him/his    Department of Dermatology  [facility]: Phone: [phone], Fax:[phone]  [facility]: Phone: [phone] Fax: [phone]

## 2020-11-19 NOTE — PATIENT INSTRUCTIONS
Thank you for coming in to be seen today!    For your folliculitis: please begin using the clindamycin lotion 1-2 times per day on the buttocks until the rash improves. You can also use an over-the-counter benzoyl peroxide wash on that area once per day. Please note that benzoyl peroxide washes can bleach towels.     For your face and scalp: this seems consistent with seborrheic dermatitis. You can use over-the-counter dandruff shampoo (zinc, ketoconazole) on both scalp and face. You can use the tacrolimus ointment one to two times per day as needed on your face as well.     HelioCare can be helpful for sun protection.     Let's have you follow up in 3 months for a full body skin exam.

## 2020-11-19 NOTE — NURSING NOTE
Chief Complaint   Patient presents with     Derm Problem     Patient is here today for a rash on buttock, and dry skin.      Brenda PULIDO CMA

## 2021-01-03 ENCOUNTER — HEALTH MAINTENANCE LETTER (OUTPATIENT)
Age: 41
End: 2021-01-03

## 2021-01-25 ENCOUNTER — IMMUNIZATION (OUTPATIENT)
Dept: NURSING | Facility: CLINIC | Age: 41
End: 2021-01-25
Payer: COMMERCIAL

## 2021-01-25 PROCEDURE — 91300 PR COVID VAC PFIZER DIL RECON 30 MCG/0.3 ML IM: CPT

## 2021-01-25 PROCEDURE — 0001A PR COVID VAC PFIZER DIL RECON 30 MCG/0.3 ML IM: CPT

## 2021-02-02 ENCOUNTER — OFFICE VISIT (OUTPATIENT)
Dept: OBGYN | Facility: CLINIC | Age: 41
End: 2021-02-02
Attending: NURSE PRACTITIONER
Payer: COMMERCIAL

## 2021-02-02 VITALS
HEIGHT: 63 IN | BODY MASS INDEX: 20.13 KG/M2 | SYSTOLIC BLOOD PRESSURE: 94 MMHG | DIASTOLIC BLOOD PRESSURE: 62 MMHG | WEIGHT: 113.6 LBS | HEART RATE: 79 BPM

## 2021-02-02 DIAGNOSIS — Z12.4 SCREENING FOR MALIGNANT NEOPLASM OF CERVIX: ICD-10-CM

## 2021-02-02 DIAGNOSIS — Z01.419 ENCOUNTER FOR GYNECOLOGICAL EXAMINATION WITHOUT ABNORMAL FINDING: ICD-10-CM

## 2021-02-02 DIAGNOSIS — Z13.220 SCREENING FOR LIPID DISORDERS: ICD-10-CM

## 2021-02-02 DIAGNOSIS — Z13.29 SCREENING FOR THYROID DISORDER: ICD-10-CM

## 2021-02-02 DIAGNOSIS — Z00.00 VISIT FOR PREVENTIVE HEALTH EXAMINATION: Primary | ICD-10-CM

## 2021-02-02 DIAGNOSIS — Z01.812 PRE-PROCEDURE LAB EXAM: ICD-10-CM

## 2021-02-02 LAB
HCG UR QL: NEGATIVE
INTERNAL QC OK POCT: YES

## 2021-02-02 PROCEDURE — G0145 SCR C/V CYTO,THINLAYER,RESCR: HCPCS | Performed by: NURSE PRACTITIONER

## 2021-02-02 PROCEDURE — 81025 URINE PREGNANCY TEST: CPT | Performed by: NURSE PRACTITIONER

## 2021-02-02 PROCEDURE — 87624 HPV HI-RISK TYP POOLED RSLT: CPT | Performed by: NURSE PRACTITIONER

## 2021-02-02 PROCEDURE — G0463 HOSPITAL OUTPT CLINIC VISIT: HCPCS

## 2021-02-02 PROCEDURE — 99395 PREV VISIT EST AGE 18-39: CPT | Performed by: NURSE PRACTITIONER

## 2021-02-02 ASSESSMENT — ANXIETY QUESTIONNAIRES
5. BEING SO RESTLESS THAT IT IS HARD TO SIT STILL: NOT AT ALL
1. FEELING NERVOUS, ANXIOUS, OR ON EDGE: SEVERAL DAYS
2. NOT BEING ABLE TO STOP OR CONTROL WORRYING: NOT AT ALL
7. FEELING AFRAID AS IF SOMETHING AWFUL MIGHT HAPPEN: NOT AT ALL
6. BECOMING EASILY ANNOYED OR IRRITABLE: NOT AT ALL
GAD7 TOTAL SCORE: 3
3. WORRYING TOO MUCH ABOUT DIFFERENT THINGS: SEVERAL DAYS

## 2021-02-02 ASSESSMENT — PATIENT HEALTH QUESTIONNAIRE - PHQ9: 5. POOR APPETITE OR OVEREATING: SEVERAL DAYS

## 2021-02-02 ASSESSMENT — MIFFLIN-ST. JEOR: SCORE: 1159.42

## 2021-02-02 NOTE — LETTER
2021       RE: Chelsea Stein  [address] [place] [address]     Dear Colleague,    Thank you for referring your patient, Chelsea Stein, to the [facility] [place] at [facility]. Please see a copy of my visit note below.        Progress Note    SUBJECTIVE:  Chelsea Stein is an 40 year old, , who requests an Annual Preventive Exam.     Concerns today include:     1. Annual preventive exam: Chelsea has not had a preventive exam for three years. Last pap smear was in 2018 with normal results; denies history of HPV or abnormal pap smears.     2. Contraception: Chelsea is interested in exploring an IUD as a contraceptive option. She has a regular menstrual cycle that comes every 3 weeks. LMP was 2021. She currently uses condoms consistently. She is sexually active with her . She declines STI testing. Chelsea is concerned about the safety of IUDs with her history of SLE, SSA+.   She used GURMEET in her 20s with menorrhagia. No personal hx of blood clots. She does not plan to have anymore children.      History of Lupus: Chelsea was diagnosed with SSA + SLE when she was 19 years old. She reports SLE is well controlled; has join pain, mild rash, raynaud's, photosensitivity. She is followed by a rheumatologist for this diagnosis.     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     Menstrual History:  Menstrual History 8/3/2018 2020 2021   LAST MENSTRUAL PERIOD 2018 1/15/2020 2021     Mammogram current: not applicable    Last Colonoscopy: n/a  Lipids: not drawn within the last 5 yrs     HISTORY:       hydroxychloroquine (PLAQUENIL) 200 MG tablet, 300 mg/day (1.5 pills) Annual Plaquenil toxicity eye screening required.       clindamycin (CLEOCIN T) 1 % external lotion, Apply topically 2 times daily Until rash resolves (Patient not taking: Reported on  2021)       Ferrous Sulfate (IRON SUPPLEMENT PO), Take 65 mg by mouth daily       ibuprofen (ADVIL/MOTRIN) 600 MG tablet, Take 1 tablet (600 mg) by mouth every 6 hours as needed for moderate pain (Patient not taking: Reported on 10/14/2020)       Prenatal Vit-Fe Fumarate-FA (PRENATAL MULTIVITAMIN PLUS IRON) 27-0.8 MG TABS per tablet, Take 1 tablet by mouth daily       tacrolimus (PROTOPIC) 0.1 % external ointment, Apply topically 2 times daily (Patient not taking: Reported on 2021)       triamcinolone (KENALOG) 0.1 % external cream, Apply topically 2 times daily (Patient not taking: Reported on 2021)    No current facility-administered medications on file prior to visit.     Allergies   Allergen Reactions     Benadryl [Diphenhydramine] Other (See Comments)     Sedation and feel like dying     Seasonal Allergies      Other reaction(s): Other: See Comments  Congestion     Immunization History   Administered Date(s) Administered     COVID-19,PF,Pfizer 2021     Influenza Vaccine IM > 6 months Valent IIV4 10/15/2018     Influenza,INJ,MDCK,PF,Quad >4yrs 2019     TDAP Vaccine (Boostrix) 10/30/2018   received flu vaccine in 6266-3091 flu season    OB History    Para Term  AB Living   2 2 1 1 0 2   SAB TAB Ectopic Multiple Live Births   0 0 0 0 2     Past Medical History:   Diagnosis Date     Lupus (systemic lupus erythematosus) (H)     SSA+     Past Surgical History:   Procedure Laterality Date      SECTION  2014     Family History   Problem Relation Age of Onset     Diabetes No family hx of      Glaucoma No family hx of      Macular Degeneration No family hx of      Social History     Socioeconomic History     Marital status:      Spouse name: None     Number of children: None     Years of education: None     Highest education level: None   Occupational History     None   Social Needs     Financial resource strain: None     Food insecurity     Worry: None      "Inability: None     Transportation needs     Medical: None     Non-medical: None   Tobacco Use     Smoking status: Never Smoker     Smokeless tobacco: Never Used   Substance and Sexual Activity     Alcohol use: No     Drug use: No     Sexual activity: Yes     Partners: Male     Birth control/protection: None   Lifestyle     Physical activity     Days per week: None     Minutes per session: None     Stress: None   Relationships     Social connections     Talks on phone: None     Gets together: None     Attends Bahai service: None     Active member of club or organization: None     Attends meetings of clubs or organizations: None     Relationship status: None     Intimate partner violence     Fear of current or ex partner: None     Emotionally abused: None     Physically abused: None     Forced sexual activity: None   Other Topics Concern     None   Social History Narrative     None       ROS  ROS: 10 point ROS neg other than the symptoms noted above in the HPI.    PHQ-2 Score:     PHQ-2 ( 1999 Pfizer) 2/2/2021 10/8/2020   Q1: Little interest or pleasure in doing things 0 0   Q2: Feeling down, depressed or hopeless 0 0   PHQ-2 Score 0 0     PHQ-9 SCORE 1/15/2019 2/8/2019   PHQ-9 Total Score 1 0     BAUDILIO-7 SCORE 1/15/2019 2/8/2019 2/2/2021   Total Score 0 0 3       EXAM:  Blood pressure 94/62, pulse 79, height 1.6 m (5' 3\"), weight 51.5 kg (113 lb 9.6 oz), last menstrual period 01/26/2021, not currently breastfeeding. Body mass index is 20.12 kg/m .  General - pleasant female in no acute distress.  Skin - no suspicious lesions or rashes  EENT-  Euthyroid with out palpable nodules  Neck - supple without lymphadenopathy.  Lungs - clear to auscultation bilaterally.  Heart - regular rate and rhythm without murmur.  Abdomen - soft, nontender, nondistended, no masses or organomegaly noted.  Musculoskeletal - no gross deformities.  Neurological - normal mental status.    Breast Exam:  Breast: Without visible skin changes. " No dimpling or lesions seen.   Breasts supple, non-tender with palpation, no dominant mass, nodularity, or nipple discharge noted bilaterally. Axillary nodes negative.      Pelvic Exam:  EG/BUS: Normal genital architecture without lesions, erythema or abnormal secretions; Bartholin's, Urethra, Maria Stein's normal   Urethral meatus: normal   Urethra: no masses, tenderness, or scarring   Bladder: no masses or tenderness   Vagina: moist, pink, rugae with creamy, white and odorless  secretions  Cervix: no lesions and pink, moist, closed, without lesion or CMT  Uterus: anteverted, non-tender, small, mobile  Adnexa: Within normal limits and No masses, nodularity, tenderness    ASSESSMENT:  Encounter Diagnoses   Name Primary?     Pre-procedure lab exam      Screening for lipid disorders      Visit for preventive health examination Yes     Screening for malignant neoplasm of cervix      Encounter for gynecological examination without abnormal finding      Screening for thyroid disorder         PLAN:   Orders Placed This Encounter   Procedures     Pelvic and Breast Exam Procedure []     Pap Smear Exam [] Do Not Remove     Lipid panel reflex to direct LDL Non-fasting     Pap imaged thin layer screen with HPV - recommended age 30 - 65 years (select HPV order below)     HPV High Risk Types DNA Cervical     TSH with free T4 reflex     hCG qual urine POCT       1. Annual Preventive Health examination: ordered labs for lipids, thyroid, and cervical cancer screening (pap with HPV co-testing). Mammograms to start at age 40.     2. Contraception: discussed the risks and benefits of IUDs with Chelsea's history of SSA + SLE. US Medical Eligibility Criteria (CDC guidelines) indicate that the LNG-IUD are risk category 3. CU-IUD is risk category 1. All questions were answered regarding IUDs; Chelsea chooses to think about her options and follow up with her rheumatologist before making the final decision. Considering having her   get a vasectomy. Will use condoms for now.     Additional teaching done at this visit regarding calcium (1200 mg per day), self breast exam, exercise, birth control, mental health and weight/diet.    Return to clinic in one year.  Follow-up as needed.    I, Donna Cisse, completed the PFSH and ROS. I then acted as a scribe for AL Delgadillo, for the remainder of the visit.  Donna Cisse DNP, RN, PHN, WHNP student     The scribed note accurately reflects my personal services and decisions made by me.  Spohia Hernandez DNP, APRN, WHNP

## 2021-02-02 NOTE — PROGRESS NOTES
Progress Note    SUBJECTIVE:  Chelsea Stein is an 40 year old, , who requests an Annual Preventive Exam.     Concerns today include:     1. Annual preventive exam: Chelsea has not had a preventive exam for three years. Last pap smear was in 2018 with normal results; denies history of HPV or abnormal pap smears.     2. Contraception: Chelsea is interested in exploring an IUD as a contraceptive option. She has a regular menstrual cycle that comes every 3 weeks. LMP was 2021. She currently uses condoms consistently. She is sexually active with her . She declines STI testing. Chelsea is concerned about the safety of IUDs with her history of SLE, SSA+.   She used GURMEET in her 20s with menorrhagia. No personal hx of blood clots. She does not plan to have anymore children.      History of Lupus: Chelsea was diagnosed with SSA + SLE when she was 19 years old. She reports SLE is well controlled; has join pain, mild rash, raynaud's, photosensitivity. She is followed by a rheumatologist for this diagnosis.     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     Menstrual History:  Menstrual History 8/3/2018 2020 2021   LAST MENSTRUAL PERIOD 2018 1/15/2020 2021     Mammogram current: not applicable    Last Colonoscopy: n/a  Lipids: not drawn within the last 5 yrs     HISTORY:       hydroxychloroquine (PLAQUENIL) 200 MG tablet, 300 mg/day (1.5 pills) Annual Plaquenil toxicity eye screening required.       clindamycin (CLEOCIN T) 1 % external lotion, Apply topically 2 times daily Until rash resolves (Patient not taking: Reported on 2021)       Ferrous Sulfate (IRON SUPPLEMENT PO), Take 65 mg by mouth daily       ibuprofen (ADVIL/MOTRIN) 600 MG tablet, Take 1 tablet (600 mg) by mouth every 6 hours as needed for moderate pain (Patient not taking: Reported on 10/14/2020)       Prenatal Vit-Fe Fumarate-FA (PRENATAL MULTIVITAMIN PLUS  IRON) 27-0.8 MG TABS per tablet, Take 1 tablet by mouth daily       tacrolimus (PROTOPIC) 0.1 % external ointment, Apply topically 2 times daily (Patient not taking: Reported on 2021)       triamcinolone (KENALOG) 0.1 % external cream, Apply topically 2 times daily (Patient not taking: Reported on 2021)    No current facility-administered medications on file prior to visit.     Allergies   Allergen Reactions     Benadryl [Diphenhydramine] Other (See Comments)     Sedation and feel like dying     Seasonal Allergies      Other reaction(s): Other: See Comments  Congestion     Immunization History   Administered Date(s) Administered     COVID-19,PF,Pfizer 2021     Influenza Vaccine IM > 6 months Valent IIV4 10/15/2018     Influenza,INJ,MDCK,PF,Quad >4yrs 2019     TDAP Vaccine (Boostrix) 10/30/2018   received flu vaccine in 8241-1630 flu season    OB History    Para Term  AB Living   2 2 1 1 0 2   SAB TAB Ectopic Multiple Live Births   0 0 0 0 2     Past Medical History:   Diagnosis Date     Lupus (systemic lupus erythematosus) (H)     SSA+     Past Surgical History:   Procedure Laterality Date      SECTION  2014     Family History   Problem Relation Age of Onset     Diabetes No family hx of      Glaucoma No family hx of      Macular Degeneration No family hx of      Social History     Socioeconomic History     Marital status:      Spouse name: None     Number of children: None     Years of education: None     Highest education level: None   Occupational History     None   Social Needs     Financial resource strain: None     Food insecurity     Worry: None     Inability: None     Transportation needs     Medical: None     Non-medical: None   Tobacco Use     Smoking status: Never Smoker     Smokeless tobacco: Never Used   Substance and Sexual Activity     Alcohol use: No     Drug use: No     Sexual activity: Yes     Partners: Male     Birth control/protection: None  "  Lifestyle     Physical activity     Days per week: None     Minutes per session: None     Stress: None   Relationships     Social connections     Talks on phone: None     Gets together: None     Attends [employer]ism service: None     Active member of club or organization: None     Attends meetings of clubs or organizations: None     Relationship status: None     Intimate partner violence     Fear of current or ex partner: None     Emotionally abused: None     Physically abused: None     Forced sexual activity: None   Other Topics Concern     None   Social History Narrative     None       ROS  ROS: 10 point ROS neg other than the symptoms noted above in the HPI.    PHQ-2 Score:     PHQ-2 ( 1999 Pfizer) 2/2/2021 10/8/2020   Q1: Little interest or pleasure in doing things 0 0   Q2: Feeling down, depressed or hopeless 0 0   PHQ-2 Score 0 0     PHQ-9 SCORE 1/15/2019 2/8/2019   PHQ-9 Total Score 1 0     BAUDILIO-7 SCORE 1/15/2019 2/8/2019 2/2/2021   Total Score 0 0 3       EXAM:  Blood pressure 94/62, pulse 79, height 1.6 m (5' 3\"), weight 51.5 kg (113 lb 9.6 oz), last menstrual period 01/26/2021, not currently breastfeeding. Body mass index is 20.12 kg/m .  General - pleasant female in no acute distress.  Skin - no suspicious lesions or rashes  EENT-  Euthyroid with out palpable nodules  Neck - supple without lymphadenopathy.  Lungs - clear to auscultation bilaterally.  Heart - regular rate and rhythm without murmur.  Abdomen - soft, nontender, nondistended, no masses or organomegaly noted.  Musculoskeletal - no gross deformities.  Neurological - normal mental status.    Breast Exam:  Breast: Without visible skin changes. No dimpling or lesions seen.   Breasts supple, non-tender with palpation, no dominant mass, nodularity, or nipple discharge noted bilaterally. Axillary nodes negative.      Pelvic Exam:  EG/BUS: Normal genital architecture without lesions, erythema or abnormal secretions; Bartholin's, Urethra, McGehee's " normal   Urethral meatus: normal   Urethra: no masses, tenderness, or scarring   Bladder: no masses or tenderness   Vagina: moist, pink, rugae with creamy, white and odorless  secretions  Cervix: no lesions and pink, moist, closed, without lesion or CMT  Uterus: anteverted, non-tender, small, mobile  Adnexa: Within normal limits and No masses, nodularity, tenderness    ASSESSMENT:  Encounter Diagnoses   Name Primary?     Pre-procedure lab exam      Screening for lipid disorders      Visit for preventive health examination Yes     Screening for malignant neoplasm of cervix      Encounter for gynecological examination without abnormal finding      Screening for thyroid disorder         PLAN:   Orders Placed This Encounter   Procedures     Pelvic and Breast Exam Procedure []     Pap Smear Exam [] Do Not Remove     Lipid panel reflex to direct LDL Non-fasting     Pap imaged thin layer screen with HPV - recommended age 30 - 65 years (select HPV order below)     HPV High Risk Types DNA Cervical     TSH with free T4 reflex     hCG qual urine POCT       1. Annual Preventive Health examination: ordered labs for lipids, thyroid, and cervical cancer screening (pap with HPV co-testing). Mammograms to start at age 40.     2. Contraception: discussed the risks and benefits of IUDs with Chelsea's history of SSA + SLE. US Medical Eligibility Criteria (CDC guidelines) indicate that the LNG-IUD are risk category 3. CU-IUD is risk category 1. All questions were answered regarding IUDs; Chelsea chooses to think about her options and follow up with her rheumatologist before making the final decision. Considering having her  get a vasectomy. Will use condoms for now.     Additional teaching done at this visit regarding calcium (1200 mg per day), self breast exam, exercise, birth control, mental health and weight/diet.    Return to clinic in one year.  Follow-up as needed.    IDonna, completed the PFSH and ROS. I  then acted as a scribe for AL Delgadillo, for the remainder of the visit.  Donna Cisse DNP, RN, PHN, WHNP student     The scribed note accurately reflects my personal services and decisions made by me.  Sophia Hernandez DNP, APRN, WHNP

## 2021-02-02 NOTE — PATIENT INSTRUCTIONS

## 2021-02-03 ASSESSMENT — ANXIETY QUESTIONNAIRES: GAD7 TOTAL SCORE: 3

## 2021-02-04 ENCOUNTER — DOCUMENTATION ONLY (OUTPATIENT)
Dept: CARE COORDINATION | Facility: CLINIC | Age: 41
End: 2021-02-04

## 2021-02-05 LAB
COPATH REPORT: NORMAL
PAP: NORMAL

## 2021-02-10 LAB
FINAL DIAGNOSIS: NORMAL
HPV HR 12 DNA CVX QL NAA+PROBE: NEGATIVE
HPV16 DNA SPEC QL NAA+PROBE: NEGATIVE
HPV18 DNA SPEC QL NAA+PROBE: NEGATIVE
SPECIMEN DESCRIPTION: NORMAL
SPECIMEN SOURCE CVX/VAG CYTO: NORMAL

## 2021-02-15 ENCOUNTER — HOSPITAL ENCOUNTER (OUTPATIENT)
Dept: NUTRITION | Facility: CLINIC | Age: 41
Discharge: HOME | End: 2021-02-15
Attending: STUDENT IN AN ORGANIZED HEALTH CARE EDUCATION/TRAINING PROGRAM | Admitting: STUDENT IN AN ORGANIZED HEALTH CARE EDUCATION/TRAINING PROGRAM
Payer: COMMERCIAL

## 2021-02-15 ENCOUNTER — IMMUNIZATION (OUTPATIENT)
Dept: NURSING | Facility: CLINIC | Age: 41
End: 2021-02-15
Attending: FAMILY MEDICINE
Payer: COMMERCIAL

## 2021-02-15 PROCEDURE — 91300 PR COVID VAC PFIZER DIL RECON 30 MCG/0.3 ML IM: CPT

## 2021-02-15 PROCEDURE — 0002A PR COVID VAC PFIZER DIL RECON 30 MCG/0.3 ML IM: CPT

## 2021-02-15 PROCEDURE — 97802 MEDICAL NUTRITION INDIV IN: CPT | Mod: GT | Performed by: DIETITIAN, REGISTERED

## 2021-02-15 NOTE — PROGRESS NOTES
"Video-Visit Details    Type of service:  Video Visit    Video Start Time (time video started): 1:00     Video End Time (time video stopped): 1:37    Originating Location (pt. Location): Home    Distant Location (provider location):  [facility] NUTRITION [facility]     Mode of Communication:  Video Conference via [facility]    OUTPATIENT NUTRITION ASSESSMENT (VIDEO VISIT DUE TO COVID-19)  REASON FOR ASSESSMENT  Chelsea Stein referred by Dr. Cartagena for MNT related to Weight loss [R63.4]  - Primary  Patient accompanied by self.      ASSESSMENT   Nutrition History:  - Information obtained from patient.  - Patient is on a regular diet at home.  Patient eats 3 meals per day.  Patient feels that since she's had kids it's been difficult for her to gain weight.      Diet Recall:  Breakfast: oatmeal with fruit (berries) with sheba or flaxseeds (6:30-7:00 AM adds honey  Snack: Fage or Siggi yogurt or carb (10:00 AM)  Lunch: leftovers -sandwich or pasta  Dinner: vegetable, grain, protein; bolognese sauce with pasta + avovado  Snack: smoothie-vegetable, fruit, peanut/almond butter; ice cream or cookie in the evening   Beverages: water and coffee   Dining out: limited       Exercise: 4-5 times per week (running) 30-45 minutes; 1/2 marathon training -60 minutes Saturday and Sunday     NUTRITION FOCUSED PHYSICAL ASSESSMENT (NFPA) FOR DIAGNOSING MALNUTRITION  Yes         Observed:   No nutrition-related physical findings observed    Obtained from Chart/Interdisciplinary Team:  Post partum weight loss    LABS  Labs reviewed    MEDICATIONS  Medications reviewed    ANTHROPOMETRICS   Height: 5'3\"  Weight: 113 lbs per Epic   BMI (kg/m2): 20 kg/m2   Weight Status:  Normal BMI  %IBW: 98%  Weight History:  lbs.  Patient states weight 103 lbs.  Wt Readings from Last 10 Encounters:   02/02/21 51.5 kg (113 lb 9.6 oz)   01/29/20 47.6 kg (105 lb)   12/18/19 47.6 kg (104 lb 14.4 oz)   06/24/19 47.1 kg (103 lb 14.4 " oz)   06/10/19 46.9 kg (103 lb 8 oz)   05/28/19 47.6 kg (105 lb)   04/01/19 50.2 kg (110 lb 9.6 oz)   03/04/19 51.7 kg (114 lb)   02/08/19 52.3 kg (115 lb 3.2 oz)   01/15/19 52.6 kg (116 lb)     ASSESSED NUTRITION NEEDS  Estimated Energy Needs: 6632-8082 kcals/day (35-40 Kcal/Kg)  Justification:  (repletion)  Estimated Protein Needs: 52-62 grams protein/day (1-1.2 g pro/Kg)  Justification:  (preservation of lean body mass)  Estimated Fluid Needs: 3316-0083 mL/day (30-35 mL/kg)    ASSESSED MALNUTRITION STATUS  % Weight Loss:  Weight loss does not meet criteria for malnutrition (has recently gained weight per chart)  % Intake:  No decreased intake noted  Subcutaneous Fat Loss:  None observed  Loss of Muscle Mass:  None observed  Fluid Retention:  None noted    Malnutrition Diagnosis:  Patient does not meet two of the above criteria necessary for diagnosing malnutrition    DIAGNOSIS   Nutrition Diagnosis:  Food and nutrition-related knowledge deficit related to lack of prior exposure as evidenced by no previous need for food and nutrition related recommendations     INTERVENTIONS   Nutrition Prescription   Recommend 5-6 small meals including high calorie foods for weight gain      IMPLEMENTATION   Assessed learning needs and learning preference  Teaching Method(s) used: Explanation  Vitamin and Mineral Supplements: per MD   Diet Education:  Provided education on six small meals  Nutrition Education (Content):   a)  Discussed current eating pattern.  Suggest increase fat intake in diet to increase calories without increasing volume as patient gets full quickly.  Add omega 3 fatty acids with emphasis on olive oil to reduce inflammation with autoimmune disease.  Also suggest increase calories on long run day to balance calorie utilization.  Suggest .5-1 lbs weight gain per week.  Supported patient with the challenge of weight gain.    Nutrition Education (Application):   a)  Discussed current eating plans and offered  suggestions as how to increase calories in diet with current foods.   b)  Patient verbalizes understanding of diet by stating will increase fat intake as does not typically add extra fat to foods.   Expected patient engagement: good    GOALS  5-6 small meals per day   Add 2 T olive per day     FOLLOW UP/MONITORING   Progress towards goals will be monitored and evaluated per protocol and Practice Guidelines  Patient to call if desires follow up appointment  RD name and number provided    Time Spent with Patient  37 minutes    Elizabeth Gutierres, RD, LD  [facility] Outpatient Dietitian  [phone] (office phone)

## 2021-03-04 ENCOUNTER — OFFICE VISIT (OUTPATIENT)
Dept: DERMATOLOGY | Facility: CLINIC | Age: 41
End: 2021-03-04
Payer: COMMERCIAL

## 2021-03-04 DIAGNOSIS — L21.9 DERMATITIS, SEBORRHEIC: Primary | ICD-10-CM

## 2021-03-04 DIAGNOSIS — L73.9 FOLLICULITIS: ICD-10-CM

## 2021-03-04 DIAGNOSIS — L82.1 SEBORRHEIC KERATOSIS: ICD-10-CM

## 2021-03-04 DIAGNOSIS — D22.9 MULTIPLE BENIGN NEVI: ICD-10-CM

## 2021-03-04 DIAGNOSIS — L81.4 SOLAR LENTIGO: ICD-10-CM

## 2021-03-04 PROCEDURE — 11900 INJECT SKIN LESIONS </W 7: CPT | Performed by: DERMATOLOGY

## 2021-03-04 PROCEDURE — 99213 OFFICE O/P EST LOW 20 MIN: CPT | Mod: 25 | Performed by: DERMATOLOGY

## 2021-03-04 ASSESSMENT — PAIN SCALES - GENERAL: PAINLEVEL: NO PAIN (0)

## 2021-03-04 NOTE — NURSING NOTE
Dermatology Rooming Note    Chelsea Stein's goals for this visit include:   Chief Complaint   Patient presents with     Skin Check     Chelsea is here today for a skin check.      JOSIANE Light

## 2021-03-04 NOTE — PROGRESS NOTES
[facility] Dermatology Note  Encounter Date: Mar 4, 2021  Office Visit     Dermatology Problem List:  1.Folliculitis, buttocks, resolved  - s/p clindamycin 0.1% lotion + benzoyl peroxide  2. Seborrheic dermatitis  - Current tx: ketoconazole shampoo, ILK injections (3/4/21)  3. SLE (MAURICIO+ 1:1280 speckled, Sm+, RNP+, SSA+, Chromatin Ab+, APS negative, low C4), follows with [facility].   - Current tx: plaquenil 300 mg daily    ____________________________________________    Assessment & Plan:    # Folliculitis, buttocks, resolved     # Seborrheic dermatitis. Not currently using any topicals. Recommend re-starting ketoconazole shampoo and ILK injections today as below. Discussed other OTC shampoo such as T gel or T sal which she may use in addition.  - Re-start OTC ketoconazole shampoo along scalp, face.  - ILK injections as below     # SLE, follows with rheumatology  - Continue plaquenil      # Benign lesions: Multiple benign nevi, cherry angiomas, acrochordon. Explained to patient benign nature of lesion. No treatment is necessary at this time unless the lesion changes or becomes symptomatic.   - ABCDs of melanoma were discussed and self skin checks were advised.  - Sun precaution was advised including the use of sun screens of SPF 30 or higher, sun protective clothing, and avoidance of tanning beds.    # Family history of NMSC, mother with BCCs  - ABCDs of melanoma were discussed and self skin checks were advised.  - Sun precaution was advised including the use of sun screens of SPF 30 or higher, sun protective clothing, and avoidance of tanning beds.  - Recommend FBSE yearly    Procedures Performed:   - Intra-lesional triamcinolone procedure note. After positioning and cleansing with isopropyl alcohol, 0.7 total mL of triamcinolone 10 mg/mL was injected into 2 lesion(s) on the scalp. The patient tolerated the procedure well and left the dermatology clinic in good  condition.    Follow-up: 1 year(s) in-person, or earlier for new or changing lesions    Staff and Medical Student:   I saw and examined this patient in the presence of Dr. Pineda.    Bolivar Whelan, MS4  [facility]    Staff Physician:  I was present with the medical student who participated in the service and in the documentation of the note. I have verified the history and personally performed the physical exam and medical decision making. I agree with the assessment and plan of care as documented in the note.     The procedure(s) was(were) performed by myself.  - Intra-lesional triamcinolone procedure note. After positioning and cleansing with isopropyl alcohol, 0.7 total mL of triamcinolone 10 mg/mL was injected into 2 lesion(s) on the scalp. The patient tolerated the procedure well and left the dermatology clinic in good condition.    Florentin Pineda MD  Pronouns: he/him/his    Department of Dermatology  [facility]: Phone: [phone], Fax:[phone]  [facility]: Phone: [phone] Fax: [phone]      ____________________________________________    CC: Skin Check (Chelsea is here today for a skin check. )    HPI:  Ms. Chelsea Stein is a(n) 40 year old female who presents today for follow-up  for skin check.    Last seen on 11/19/20 for a virtual visit. We discussed starting clindamycin for folliculitis and starting protopic ointment for her seborrheic dermatitis.     Folliculitis: She used the clindamycin for a time and the rash resolved several months ago.    Seborrheic dermatitis: The plan at last visit was to start protopic 0.1% ointment PRN to face. She will use this occasionally, but she reports her sebhorreic dermatitis is not as bad this season. Not using the ketoconazole shampoo. She has thicker pieces of dry skin or dandruff on her scalp. She has been using Head  and Shoulders, but the thick scale persists.    No spots of concern today. No lesions that are growing or changing.     She has a family history of several BCCs in her mother. No personal history of skin cancer. She wears sunscreen every day. No history of tanning bed use. She has historically held occupations that are indoors. She does burn easily.    Patient is otherwise feeling well, without additional skin concerns.    Labs:  None reviewed.    Physical Exam:  Vitals: There were no vitals taken for this visit.  SKIN: Full skin, which includes the head/face, both arms, chest, back, abdomen,both legs, genitalia and/or groin buttocks, digits and/or nails, was examined.  - There are scattered 2-3 mm light brown to brown macules on the chest, back, and extermities  - There are 1 mm bright red papules on the back  - There are few skin colored exophytic papules on the shoulders  - There are pink papules with thick scale on the scalp  - No other lesions of concern on areas examined.     Medications:  Current Outpatient Medications   Medication     hydroxychloroquine (PLAQUENIL) 200 MG tablet     clindamycin (CLEOCIN T) 1 % external lotion     Ferrous Sulfate (IRON SUPPLEMENT PO)     ibuprofen (ADVIL/MOTRIN) 600 MG tablet     Prenatal Vit-Fe Fumarate-FA (PRENATAL MULTIVITAMIN PLUS IRON) 27-0.8 MG TABS per tablet     tacrolimus (PROTOPIC) 0.1 % external ointment     triamcinolone (KENALOG) 0.1 % external cream     No current facility-administered medications for this visit.       Past Medical History:   Patient Active Problem List   Diagnosis     Supervision of high-risk pregnancy of elderly multigravida     Systemic lupus erythematosus (H)     Cervical polyp     Leukopenia     History of  delivery, antepartum     Abdominal pain     Past Medical History:   Diagnosis Date     Lupus (systemic lupus erythematosus) (H)     SSA+     Family History:    She has a family history of several BCCs in her mother.

## 2021-03-04 NOTE — LETTER
3/4/2021       RE: Chelsea Stein  [address] [place] [address]     Dear Colleague,    Thank you for referring your patient, Chelsea Stein, to the [facility] DERMATOLOGY CLINIC [place] at [facility]. Please see a copy of my visit note below.    [facility] Dermatology Note  Encounter Date: Mar 4, 2021  Office Visit     Dermatology Problem List:  1.Folliculitis, buttocks, resolved  - s/p clindamycin 0.1% lotion + benzoyl peroxide  2. Seborrheic dermatitis  - Current tx: ketoconazole shampoo, ILK injections (3/4/21)  3. SLE (MAURICIO+ 1:1280 speckled, Sm+, RNP+, SSA+, Chromatin Ab+, APS negative, low C4), follows with [facility] Rheumatology.   - Current tx: plaquenil 300 mg daily    ____________________________________________    Assessment & Plan:    # Folliculitis, buttocks, resolved     # Seborrheic dermatitis. Not currently using any topicals. Recommend re-starting ketoconazole shampoo and ILK injections today as below. Discussed other OTC shampoo such as T gel or T sal which she may use in addition.  - Re-start OTC ketoconazole shampoo along scalp, face.  - ILK injections as below     # SLE, follows with rheumatology  - Continue plaquenil      # Benign lesions: Multiple benign nevi, cherry angiomas, acrochordon. Explained to patient benign nature of lesion. No treatment is necessary at this time unless the lesion changes or becomes symptomatic.   - ABCDs of melanoma were discussed and self skin checks were advised.  - Sun precaution was advised including the use of sun screens of SPF 30 or higher, sun protective clothing, and avoidance of tanning beds.    # Family history of NMSC, mother with BCCs  - ABCDs of melanoma were discussed and self skin checks were advised.  - Sun precaution was advised including the use of sun screens of SPF 30 or higher, sun protective clothing, and avoidance of tanning beds.  - Recommend FBSE  yearly    Procedures Performed:   - Intra-lesional triamcinolone procedure note. After positioning and cleansing with isopropyl alcohol, 0.7 total mL of triamcinolone 10 mg/mL was injected into 2 lesion(s) on the scalp. The patient tolerated the procedure well and left the dermatology clinic in good condition.    Follow-up: 1 year(s) in-person, or earlier for new or changing lesions    Staff and Medical Student:   I saw and examined this patient in the presence of Dr. Pineda.    Bolivar Whelan, MS4  [facility]    Staff Physician:  I was present with the medical student who participated in the service and in the documentation of the note. I have verified the history and personally performed the physical exam and medical decision making. I agree with the assessment and plan of care as documented in the note.     The procedure(s) was(were) performed by myself.  - Intra-lesional triamcinolone procedure note. After positioning and cleansing with isopropyl alcohol, 0.7 total mL of triamcinolone 10 mg/mL was injected into 2 lesion(s) on the scalp. The patient tolerated the procedure well and left the dermatology clinic in good condition.    Florentin Pineda MD  Pronouns: he/him/his    Department of Dermatology  [facility]: Phone: [phone], Fax:[phone]  [facility]: Phone: [phone] Fax: [phone]      ____________________________________________    CC: Skin Check (Chelsea is here today for a skin check. )    HPI:  Ms. Chelsea Stein is a(n) 40 year old female who presents today for follow-up  for skin check.    Last seen on 11/19/20 for a virtual visit. We discussed starting clindamycin for folliculitis and starting protopic ointment for her seborrheic dermatitis.     Folliculitis: She used the clindamycin for a time and the rash resolved several months ago.    Seborrheic  dermatitis: The plan at last visit was to start protopic 0.1% ointment PRN to face. She will use this occasionally, but she reports her sebhorreic dermatitis is not as bad this season. Not using the ketoconazole shampoo. She has thicker pieces of dry skin or dandruff on her scalp. She has been using Head and Shoulders, but the thick scale persists.    No spots of concern today. No lesions that are growing or changing.     She has a family history of several BCCs in her mother. No personal history of skin cancer. She wears sunscreen every day. No history of tanning bed use. She has historically held occupations that are indoors. She does burn easily.    Patient is otherwise feeling well, without additional skin concerns.    Labs:  None reviewed.    Physical Exam:  Vitals: There were no vitals taken for this visit.  SKIN: Full skin, which includes the head/face, both arms, chest, back, abdomen,both legs, genitalia and/or groin buttocks, digits and/or nails, was examined.  - There are scattered 2-3 mm light brown to brown macules on the chest, back, and extermities  - There are 1 mm bright red papules on the back  - There are few skin colored exophytic papules on the shoulders  - There are pink papules with thick scale on the scalp  - No other lesions of concern on areas examined.     Medications:  Current Outpatient Medications   Medication     hydroxychloroquine (PLAQUENIL) 200 MG tablet     clindamycin (CLEOCIN T) 1 % external lotion     Ferrous Sulfate (IRON SUPPLEMENT PO)     ibuprofen (ADVIL/MOTRIN) 600 MG tablet     Prenatal Vit-Fe Fumarate-FA (PRENATAL MULTIVITAMIN PLUS IRON) 27-0.8 MG TABS per tablet     tacrolimus (PROTOPIC) 0.1 % external ointment     triamcinolone (KENALOG) 0.1 % external cream     No current facility-administered medications for this visit.       Past Medical History:   Patient Active Problem List   Diagnosis     Supervision of high-risk pregnancy of elderly multigravida     Systemic lupus  erythematosus (H)     Cervical polyp     Leukopenia     History of  delivery, antepartum     Abdominal pain     Past Medical History:   Diagnosis Date     Lupus (systemic lupus erythematosus) (H)     SSA+     Family History:    She has a family history of several BCCs in her mother.     Again, thank you for allowing me to participate in the care of your patient.      Sincerely,    Florentin Pineda MD

## 2021-06-02 DIAGNOSIS — M32.9 SYSTEMIC LUPUS COMPLICATING PREGNANCY (H): ICD-10-CM

## 2021-06-02 DIAGNOSIS — O99.891 SYSTEMIC LUPUS COMPLICATING PREGNANCY (H): ICD-10-CM

## 2021-06-04 RX ORDER — HYDROXYCHLOROQUINE SULFATE 200 MG/1
TABLET, FILM COATED ORAL
Qty: 90 TABLET | Refills: 0 | Status: SHIPPED | OUTPATIENT
Start: 2021-06-04 | End: 2021-09-29

## 2021-06-04 NOTE — TELEPHONE ENCOUNTER
hydroxychloroquine (PLAQUENIL) 200 MG tablet  300 mg/day (1.5 pills) Annual Plaquenil toxicity eye screening required.      Last Written Prescription Date:  10/15/20  Last Fill Quantity: 135,   # refills: 1  Last Office Visit : 10/14/20  Plan:  1. continue plaquenil 300 mg daily.             Plaquenil started at 18 yo             Last eye exam: 8/2020  3. Labs today: C3, C4, dsDNA, U Pr/Cr, CRP  4. Refer to nutrition     RTC in 6 months  *Scheduling has been notified to contact the pt for appointment.    Future Office visit:  none  Last eye exam: 8/21/20    Refill to pharmacy.

## 2021-08-06 ENCOUNTER — TELEPHONE (OUTPATIENT)
Dept: OPHTHALMOLOGY | Facility: CLINIC | Age: 41
End: 2021-08-06

## 2021-08-26 ENCOUNTER — TELEPHONE (OUTPATIENT)
Dept: OBGYN | Facility: CLINIC | Age: 41
End: 2021-08-26

## 2021-08-26 NOTE — TELEPHONE ENCOUNTER
Tried to leave message for patient to cb to schedule appt.     Vm is full.  MOOI message is going to be sent.     Thank you     Atif

## 2021-08-27 ENCOUNTER — OFFICE VISIT (OUTPATIENT)
Dept: FAMILY MEDICINE | Facility: CLINIC | Age: 41
End: 2021-08-27
Attending: FAMILY MEDICINE
Payer: COMMERCIAL

## 2021-08-27 ENCOUNTER — LAB (OUTPATIENT)
Dept: LAB | Facility: CLINIC | Age: 41
End: 2021-08-27
Attending: FAMILY MEDICINE
Payer: COMMERCIAL

## 2021-08-27 VITALS
BODY MASS INDEX: 19.44 KG/M2 | HEART RATE: 72 BPM | SYSTOLIC BLOOD PRESSURE: 96 MMHG | WEIGHT: 109.7 LBS | HEIGHT: 63 IN | DIASTOLIC BLOOD PRESSURE: 69 MMHG

## 2021-08-27 DIAGNOSIS — M32.9 SYSTEMIC LUPUS ERYTHEMATOSUS, UNSPECIFIED SLE TYPE, UNSPECIFIED ORGAN INVOLVEMENT STATUS (H): ICD-10-CM

## 2021-08-27 DIAGNOSIS — Z01.818 PREOP GENERAL PHYSICAL EXAM: ICD-10-CM

## 2021-08-27 DIAGNOSIS — Z01.818 PREOP GENERAL PHYSICAL EXAM: Primary | ICD-10-CM

## 2021-08-27 LAB
ANION GAP SERPL CALCULATED.3IONS-SCNC: 1 MMOL/L (ref 3–14)
BUN SERPL-MCNC: 12 MG/DL (ref 7–30)
CALCIUM SERPL-MCNC: 8.5 MG/DL (ref 8.5–10.1)
CHLORIDE BLD-SCNC: 107 MMOL/L (ref 94–109)
CO2 SERPL-SCNC: 26 MMOL/L (ref 20–32)
CREAT SERPL-MCNC: 0.76 MG/DL (ref 0.52–1.04)
GFR SERPL CREATININE-BSD FRML MDRD: >90 ML/MIN/1.73M2
GLUCOSE BLD-MCNC: 79 MG/DL (ref 70–99)
HGB BLD-MCNC: 13.2 G/DL (ref 11.7–15.7)
POTASSIUM BLD-SCNC: 4.6 MMOL/L (ref 3.4–5.3)
SODIUM SERPL-SCNC: 134 MMOL/L (ref 133–144)

## 2021-08-27 PROCEDURE — 99214 OFFICE O/P EST MOD 30 MIN: CPT | Performed by: FAMILY MEDICINE

## 2021-08-27 PROCEDURE — 36415 COLL VENOUS BLD VENIPUNCTURE: CPT

## 2021-08-27 PROCEDURE — 82374 ASSAY BLOOD CARBON DIOXIDE: CPT

## 2021-08-27 PROCEDURE — G0463 HOSPITAL OUTPT CLINIC VISIT: HCPCS

## 2021-08-27 PROCEDURE — 85018 HEMOGLOBIN: CPT

## 2021-08-27 ASSESSMENT — MIFFLIN-ST. JEOR: SCORE: 1136.73

## 2021-08-27 NOTE — LETTER
8/27/2021       RE: Chelsea Stein  [address]  [place] [address]     Dear Colleague,    Thank you for referring your patient, Chelsea Stein, to the [facility] at [facility]. Please see a copy of my visit note below.    [facility] PROFESSIONAL BLDG  3RD FLR,TATI 300  [address]  [facility] [address]  [facility] [address]  Phone: [phone]  Fax: [phone]  Primary Provider: Jeff Burden        PREOPERATIVE EVALUATION:  Today's date: 8/27/2021    Chelsea Stein is a 41 year old female who presents for a preoperative evaluation.    Surgical Information:  Surgery/Procedure: Local flap repair left parietal scalp, s/p Moh's surgery  Surgery Location: [facility]  Surgeon: Dr. Dereck Maciel  Surgery Date: 8/30/2021  .  Where patient plans to recover: At home with family  Fax number for surgical facility:     Type of Anesthesia Anticipated: General    Assessment & Plan     The proposed surgical procedure is considered LOW risk.    Preop general physical exam    - Hemoglobin; Future  - Basic metabolic panel; Future    Medication Instructions:  Patient is to take all scheduled medications on the day of surgery     RECOMMENDATION:  APPROVAL GIVEN to proceed with proposed procedure, without further diagnostic evaluation.            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      1. No - Have you ever had a heart attack or stroke?  2. No - Have you ever had surgery on your heart or blood vessels, such as a stent, coronary (heart) bypass, or surgery on an artery in the head, neck, heart, or legs?  3. No - Do you have chest pain when you are physically active?  4. No - Do you have a history of heart failure?  5. No - Do you currently have a cold, bronchitis, or symptoms of other respiratory  (head and chest) infections?  6. No - Do you have a cough, shortness of breath, or wheezing?  7. No - Do you or anyone in your family have a history of blood clots?  8. No - Do you or anyone in your family have a serious bleeding problem, such as long-lasting bleeding after surgeries or cuts?  9. No - Have you ever had anemia or been told to take iron pills?  10. No - Have you had any abnormal blood loss such as black, tarry or bloody stools, or abnormal vaginal bleeding?  11. No - Have you ever had a blood transfusion?  12. Yes - Are you willing to have a blood transfusion if it is medically needed before, during, or after your surgery?  13. No - Have you or anyone in your family ever had problems with anesthesia (sedation for surgery)?  14. No - Do you have sleep apnea, excessive snoring, or daytime drowsiness?   15. No - Do you have any artifical heart valves or other implanted medical devices, such as a pacemaker, defibrillator, or continuous glucose monitor?  16. No - Do you have any artifical joints?  17. No - Are you allergic to latex?  18. No - Is there any chance that you may be pregnant?    Health Care Directive:  Patient does not have a Health Care Directive or Living Will: Discussed advance care planning with patient; however, patient declined at this time.  .[id number]}    Status of Chronic Conditions:  See problem list for active medical problems.  Problems all longstanding and stable, except as noted/documented; History of SLE, no renal involvement, symptoms controlled See ROS for pertinent symptoms related to these conditions.      Review of Systems  CONSTITUTIONAL: NEGATIVE for fever, chills, change in weight  EYES: NEGATIVE for vision changes or irritation  ENT/MOUTH: NEGATIVE for ear, mouth and throat problems  RESP: NEGATIVE for significant cough or SOB  CV: NEGATIVE for chest pain, palpitations or peripheral edema  GI: NEGATIVE for nausea, abdominal pain, heartburn, or change in bowel habits  :  "NEGATIVE for frequency, dysuria, or hematuria  MUSCULOSKELETAL: NEGATIVE for significant arthralgias or myalgia  NEURO: NEGATIVE for weakness, dizziness or paresthesias  ENDOCRINE: NEGATIVE for temperature intolerance, skin/hair changes  HEME: NEGATIVE for bleeding problems  PSYCHIATRIC: NEGATIVE for changes in mood or affect    Patient Active Problem List    Diagnosis Date Noted     Abdominal pain 2018     Priority: Medium     History of  delivery, antepartum 10/30/2018     Priority: Medium     Planning TOLAC- needs to sign consent       Supervision of high-risk pregnancy of elderly multigravida 10/17/2018     Priority: Medium     Systemic lupus erythematosus (H) 10/17/2018     Priority: Medium     Cervical polyp 10/17/2018     Priority: Medium     Leukopenia 10/17/2018     Priority: Medium      Past Medical History:   Diagnosis Date     Lupus (systemic lupus erythematosus) (H)     SSA+     Past Surgical History:   Procedure Laterality Date      SECTION  2014     Current Outpatient Medications   Medication Sig Dispense Refill     hydroxychloroquine (PLAQUENIL) 200 MG tablet 300 mg/day (1.5 pills) Annual Plaquenil toxicity eye screening required. Please call Rheumatology to schedule appointment at [phone]. 90 tablet 0       Allergies   Allergen Reactions     Benadryl [Diphenhydramine] Other (See Comments)     Sedation and feel like dying     Seasonal Allergies      Other reaction(s): Other: See Comments  Congestion        Social History     Tobacco Use     Smoking status: Never Smoker     Smokeless tobacco: Never Used   Substance Use Topics     Alcohol use: Yes     Comment: 1-2 glasses of wine a week.      .  History   Drug Use No         Objective     BP 96/69   Pulse 72   Ht 1.6 m (5' 3\")   Wt 49.8 kg (109 lb 11.2 oz)   BMI 19.43 kg/m      Physical Exam    GENERAL APPEARANCE: healthy, alert and no distress     EYES: EOMI, PERRL     HENT: ear canals and TM's normal and nose and " mouth without ulcers or lesions     NECK: no adenopathy, no asymmetry, masses, or scars and thyroid normal to palpation     RESP: lungs clear to auscultation - no rales, rhonchi or wheezes     CV: regular rates and rhythm, normal S1 S2, no S3 or S4 and no murmur, click or rub     ABDOMEN:  soft, nontender, no HSM or masses and bowel sounds normal     MS: extremities normal- no gross deformities noted, no evidence of inflammation in joints, FROM in all extremities.     SKIN: no suspicious lesions or rashes     NEURO: Normal strength and tone, sensory exam grossly normal, mentation intact and speech normal     PSYCH: mentation appears normal. and affect normal/bright     LYMPHATICS: No cervical adenopathy    Recent Labs   Lab Test 10/19/20  1320 12/16/19  1040   HGB 13.8 13.1    201   CR 0.70  --       Hemoglobin   Date Value Ref Range Status   08/27/2021 13.2 11.7 - 15.7 g/dL Final   10/19/2020 13.8 11.7 - 15.7 g/dL Final     Sodium 133 - 144 mmol/L 134      Potassium 3.4 - 5.3 mmol/L 4.6     Chloride 94 - 109 mmol/L 107     Carbon Dioxide (CO2) 20 - 32 mmol/L 26     Anion Gap 3 - 14 mmol/L 1Low      Urea Nitrogen 7 - 30 mg/dL 12     Creatinine 0.52 - 1.04 mg/dL 0.76     Calcium 8.5 - 10.1 mg/dL 8.5     Glucose 70 - 99 mg/dL 79     GFR Estimate >60 mL/min/1.73m2 >90            Diagnostics:  .  No EKG required for low risk surgery (cataract, skin procedure, breast biopsy, etc).  No EKG required, no history of coronary heart disease, significant arrhythmia, peripheral arterial disease or other structural heart disease.    Revised Cardiac Risk Index (RCRI):  The patient has the following serious cardiovascular risks for perioperative complications:   - No serious cardiac risks = 0 points     RCRI Interpretation: 0 points: Class I (very low risk - 0.4% complication rate)        Signed Electronically by: Aleksander Nelson MD  Copy of this evaluation report is provided to requesting physician.

## 2021-08-27 NOTE — PROGRESS NOTES
[facility] PROFESSIONAL BLDG  3RD FLR,TATI 300  [address]  [address] [address]  Phone: [phone]  Fax: [phone]  Primary Provider: Jeff Burden        PREOPERATIVE EVALUATION:  Today's date: 8/27/2021    Chelsea Stein is a 41 year old female who presents for a preoperative evaluation.    Surgical Information:  Surgery/Procedure: Local flap repair left parietal scalp, s/p Moh's surgery  Surgery Location: [facility]  Surgeon: Dr. Dereck Maciel  Surgery Date: 8/30/2021  .  Where patient plans to recover: At home with family  Fax number for surgical facility:     Type of Anesthesia Anticipated: General    Assessment & Plan     The proposed surgical procedure is considered LOW risk.    Preop general physical exam    - Hemoglobin; Future  - Basic metabolic panel; Future    Medication Instructions:  Patient is to take all scheduled medications on the day of surgery     RECOMMENDATION:  APPROVAL GIVEN to proceed with proposed procedure, without further diagnostic evaluation.            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      1. No - Have you ever had a heart attack or stroke?  2. No - Have you ever had surgery on your heart or blood vessels, such as a stent, coronary (heart) bypass, or surgery on an artery in the head, neck, heart, or legs?  3. No - Do you have chest pain when you are physically active?  4. No - Do you have a history of heart failure?  5. No - Do you currently have a cold, bronchitis, or symptoms of other respiratory (head and chest) infections?  6. No - Do you have a cough, shortness of breath, or wheezing?  7. No - Do you or anyone in your family have a history of blood clots?  8. No - Do you or anyone in your family have a serious bleeding problem, such as long-lasting bleeding after surgeries or cuts?  9. No - Have  you ever had anemia or been told to take iron pills?  10. No - Have you had any abnormal blood loss such as black, tarry or bloody stools, or abnormal vaginal bleeding?  11. No - Have you ever had a blood transfusion?  12. Yes - Are you willing to have a blood transfusion if it is medically needed before, during, or after your surgery?  13. No - Have you or anyone in your family ever had problems with anesthesia (sedation for surgery)?  14. No - Do you have sleep apnea, excessive snoring, or daytime drowsiness?   15. No - Do you have any artifical heart valves or other implanted medical devices, such as a pacemaker, defibrillator, or continuous glucose monitor?  16. No - Do you have any artifical joints?  17. No - Are you allergic to latex?  18. No - Is there any chance that you may be pregnant?    Health Care Directive:  Patient does not have a Health Care Directive or Living Will: Discussed advance care planning with patient; however, patient declined at this time.  .[id number]}    Status of Chronic Conditions:  See problem list for active medical problems.  Problems all longstanding and stable, except as noted/documented; History of SLE, no renal involvement, symptoms controlled See ROS for pertinent symptoms related to these conditions.      Review of Systems  CONSTITUTIONAL: NEGATIVE for fever, chills, change in weight  EYES: NEGATIVE for vision changes or irritation  ENT/MOUTH: NEGATIVE for ear, mouth and throat problems  RESP: NEGATIVE for significant cough or SOB  CV: NEGATIVE for chest pain, palpitations or peripheral edema  GI: NEGATIVE for nausea, abdominal pain, heartburn, or change in bowel habits  : NEGATIVE for frequency, dysuria, or hematuria  MUSCULOSKELETAL: NEGATIVE for significant arthralgias or myalgia  NEURO: NEGATIVE for weakness, dizziness or paresthesias  ENDOCRINE: NEGATIVE for temperature intolerance, skin/hair changes  HEME: NEGATIVE for bleeding problems  PSYCHIATRIC: NEGATIVE for changes  "in mood or affect    Patient Active Problem List    Diagnosis Date Noted     Abdominal pain 2018     Priority: Medium     History of  delivery, antepartum 10/30/2018     Priority: Medium     Planning TOLAC- needs to sign consent       Supervision of high-risk pregnancy of elderly multigravida 10/17/2018     Priority: Medium     Systemic lupus erythematosus (H) 10/17/2018     Priority: Medium     Cervical polyp 10/17/2018     Priority: Medium     Leukopenia 10/17/2018     Priority: Medium      Past Medical History:   Diagnosis Date     Lupus (systemic lupus erythematosus) (H)     SSA+     Past Surgical History:   Procedure Laterality Date      SECTION  2014     Current Outpatient Medications   Medication Sig Dispense Refill     hydroxychloroquine (PLAQUENIL) 200 MG tablet 300 mg/day (1.5 pills) Annual Plaquenil toxicity eye screening required. Please call Rheumatology to schedule appointment at [phone]. 90 tablet 0       Allergies   Allergen Reactions     Benadryl [Diphenhydramine] Other (See Comments)     Sedation and feel like dying     Seasonal Allergies      Other reaction(s): Other: See Comments  Congestion        Social History     Tobacco Use     Smoking status: Never Smoker     Smokeless tobacco: Never Used   Substance Use Topics     Alcohol use: Yes     Comment: 1-2 glasses of wine a week.      .  History   Drug Use No         Objective     BP 96/69   Pulse 72   Ht 1.6 m (5' 3\")   Wt 49.8 kg (109 lb 11.2 oz)   BMI 19.43 kg/m      Physical Exam    GENERAL APPEARANCE: healthy, alert and no distress     EYES: EOMI, PERRL     HENT: ear canals and TM's normal and nose and mouth without ulcers or lesions     NECK: no adenopathy, no asymmetry, masses, or scars and thyroid normal to palpation     RESP: lungs clear to auscultation - no rales, rhonchi or wheezes     CV: regular rates and rhythm, normal S1 S2, no S3 or S4 and no murmur, click or rub     ABDOMEN:  soft, nontender, no " HSM or masses and bowel sounds normal     MS: extremities normal- no gross deformities noted, no evidence of inflammation in joints, FROM in all extremities.     SKIN: no suspicious lesions or rashes     NEURO: Normal strength and tone, sensory exam grossly normal, mentation intact and speech normal     PSYCH: mentation appears normal. and affect normal/bright     LYMPHATICS: No cervical adenopathy    Recent Labs   Lab Test 10/19/20  1320 12/16/19  1040   HGB 13.8 13.1    201   CR 0.70  --       Hemoglobin   Date Value Ref Range Status   08/27/2021 13.2 11.7 - 15.7 g/dL Final   10/19/2020 13.8 11.7 - 15.7 g/dL Final     Sodium 133 - 144 mmol/L 134      Potassium 3.4 - 5.3 mmol/L 4.6     Chloride 94 - 109 mmol/L 107     Carbon Dioxide (CO2) 20 - 32 mmol/L 26     Anion Gap 3 - 14 mmol/L 1Low      Urea Nitrogen 7 - 30 mg/dL 12     Creatinine 0.52 - 1.04 mg/dL 0.76     Calcium 8.5 - 10.1 mg/dL 8.5     Glucose 70 - 99 mg/dL 79     GFR Estimate >60 mL/min/1.73m2 >90            Diagnostics:  .  No EKG required for low risk surgery (cataract, skin procedure, breast biopsy, etc).  No EKG required, no history of coronary heart disease, significant arrhythmia, peripheral arterial disease or other structural heart disease.    Revised Cardiac Risk Index (RCRI):  The patient has the following serious cardiovascular risks for perioperative complications:   - No serious cardiac risks = 0 points     RCRI Interpretation: 0 points: Class I (very low risk - 0.4% complication rate)           Signed Electronically by: Aleksander Nelson MD  Copy of this evaluation report is provided to requesting physician.

## 2021-09-29 DIAGNOSIS — M32.9 SYSTEMIC LUPUS COMPLICATING PREGNANCY (H): ICD-10-CM

## 2021-09-29 DIAGNOSIS — O99.891 SYSTEMIC LUPUS COMPLICATING PREGNANCY (H): ICD-10-CM

## 2021-09-29 RX ORDER — HYDROXYCHLOROQUINE SULFATE 200 MG/1
400 TABLET, FILM COATED ORAL DAILY
Qty: 180 TABLET | Refills: 3 | Status: SHIPPED | OUTPATIENT
Start: 2021-09-29 | End: 2022-04-14

## 2021-09-29 NOTE — TELEPHONE ENCOUNTER
hydroxychloroquine (PLAQUENIL) 200 MG tablet    300 mg/day (1.5 pills  Last Written Prescription Date:  6-4-21  Last Fill Quantity: 90,   # refills: 0  Last Office Visit : 10-14-20 ( RTC 6 M)  Future Office visit:  none  Last Eye exam: 8-14-20  Next Eye exam: 10-25-21      Routing refill request to provider for review/approval because:  Past due eye exam, appt    Scheduling has been notified to contact the pt for appointment.

## 2021-10-10 ENCOUNTER — HEALTH MAINTENANCE LETTER (OUTPATIENT)
Age: 41
End: 2021-10-10

## 2021-11-08 ENCOUNTER — ALLIED HEALTH/NURSE VISIT (OUTPATIENT)
Dept: OPHTHALMOLOGY | Facility: CLINIC | Age: 41
End: 2021-11-08
Attending: OPHTHALMOLOGY
Payer: COMMERCIAL

## 2021-11-08 DIAGNOSIS — Z79.899 ENCOUNTER FOR LONG-TERM (CURRENT) USE OF HIGH-RISK MEDICATION: ICD-10-CM

## 2021-11-08 PROCEDURE — 92274 MULTIFOCAL ERG W/I&R: CPT

## 2021-11-08 PROCEDURE — 999N000103 HC STATISTIC NO CHARGE FACILITY FEE

## 2021-11-08 ASSESSMENT — VISUAL ACUITY
METHOD: SNELLEN - LINEAR
OS_SC: 20/20
OD_SC: 20/20

## 2021-11-08 NOTE — NURSING NOTE
"Chief Complaints and History of Present Illnesses   Patient presents with     procedure mfERG     Chief Complaint(s) and History of Present Illness(es)     procedure mfERG               Comments     mfERG for plaquenil monitoring    plaquenil use for lupus.   Plaquenil usage 400 mg/day 5299-4719, then 300 mg/day since 2018  Was on/off for ~ 10 years prior to 2012 with intermittent use for few months at time during few flares  Weight 108lbs, Height 5'3\"  Heidi Kingsley COA 11:53 AM November 8, 2021                   "

## 2021-11-15 NOTE — PROGRESS NOTES
Multifocal ERG Results    Diagnostic Indication:   Plaquenil usage 400 mg/day 7779-0218, then 300 mg/day since 2018  Was on/off for ~ 10 years prior to 2012 with intermittent use for few months at time during few flares  Weight 108#, no renal disease, no tamoxifen  Has been getting regular monitoring overall in [facility] & [place]  Testing in 2020 showed mild abnormalities in the 10-2 visual field.      Diagnostic findings:  A 103 hexagon stimulus pattern was used to obtain a mfERG in both eyes.    For the each eye, the test was noted to be reliable by the technician.  The waveform tracings were normal with good SNR.  The amplitude plot showed a well formed foveal peak.  The amplitude values were within normal limits. The latencies did not show any abnormal increase.  The ring tracings were essentially normal.  The ring ratios were essentially within normal limits for ring amplitudes.      Diagnostic impression:    1.  Normal mfERG both eyes.    2.  No evidence of  plaquenil toxicity.    Clinical recommendations: clinical correlation recommended.

## 2021-12-06 ENCOUNTER — VIRTUAL VISIT (OUTPATIENT)
Dept: RHEUMATOLOGY | Facility: CLINIC | Age: 41
End: 2021-12-06
Attending: STUDENT IN AN ORGANIZED HEALTH CARE EDUCATION/TRAINING PROGRAM
Payer: COMMERCIAL

## 2021-12-06 DIAGNOSIS — M32.9 SYSTEMIC LUPUS ERYTHEMATOSUS, UNSPECIFIED SLE TYPE, UNSPECIFIED ORGAN INVOLVEMENT STATUS (H): Primary | ICD-10-CM

## 2021-12-06 PROCEDURE — 99214 OFFICE O/P EST MOD 30 MIN: CPT | Mod: 95 | Performed by: STUDENT IN AN ORGANIZED HEALTH CARE EDUCATION/TRAINING PROGRAM

## 2021-12-06 ASSESSMENT — PAIN SCALES - GENERAL: PAINLEVEL: MILD PAIN (3)

## 2021-12-06 NOTE — LETTER
12/6/2021       RE: Chelsea Stein  [address] [place] [address]     Dear Colleague,    Thank you for referring your patient, Chelsea Stein, to the [facility] [place] at [facility]. Please see a copy of my visit note below.    [facility]  Date: 12/06/2021  CC: lupus    Patient Summary: 42 yo F with SSA+ SLE who transfered her care from [facility] (Dr. Tiffany Parkinson) and [facility] (Dr. Conde) and was pregnant with 2nd child at initial visit (10/15/18). More recently she is now s/p 2 pregnancies without any lupus related complications. She was diagnosed at 20 yo initially with raynaud's then in her 20s had a full flare and was diagnosed with lupus. She has not required steroids since 2016 though her disease is very steroid responsive. C4 levels and dsDNA levels do correspond to flares per patient. Before she moved her persistent neutropenia was evaluated via BM biopsy which was unremarkable.    Interval history (12/06/2021): overall doing well. Noticing some joint pain with colder weather particularly elbows, hips, and knees but not so much in her hands and wrists. Had a rough couple of months-- had a patch on her scalp which was biopsied and found to be skin cancer. She had moh's surgery for removal and they kept having to take more. She was referred to plastic surgery to complete removal and to put her scalp skin back together. No raynaud's symptoms. No facial rashes. Kids are doing well.    ROS: A comprehensive 14 point ROS was done. Positives are per HPI.    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   General: NAD, very pleasant  HEENT: face symmetric, EOMI  CV: well perfused extremities  Resp: breathing comfortably on room air  MSK: no swelling or limitations in ROM of fingers, knuckles, wrists  Skin: no visible malar rash. lesion on left super scalp, mild erythema, otherwise appears to be healing well.  Psych: congruent mood and affect    Lab Results   Component Value Date/Time    DNA 3 10/19/2020 01:20 PM    DNA 3 2019 10:40 AM    DNA 3 10/16/2019 10:03 AM     RHEUM RESULTS Latest Ref Rng & Units 2018   ALBUMIN 3.4 - 5.0 g/dL - - -   ALT 0 - 50 U/L - 28 -   AST 0 - 45 U/L - 26 -   COMPLEMENT C3 81 - 157 mg/dL - - -   COMPLEMENT C4 13 - 39 mg/dL - - -   CREATININE 0.52 - 1.04 mg/dL 0.79 0.64 -   CRP 0.0 - 8.0 mg/L - - -   DNA <10 IU/mL - - -   GFR ESTIMATE, IF BLACK >60 mL/min/[1.73:m2] >60 >60 -   GFR ESTIMATE >60 mL/min/1.73m2 >60 >60 -   HEMATOCRIT 35.0 - 47.0 % - - -   HEMOGLOBIN 11.7 - 15.7 g/dL - - -   HEPBANG - - - Negative   WBC 4.0 - 11.0 10e9/L - - -   RBC 3.8 - 5.2 10e12/L - - -   RDW 10.0 - 15.0 % - - -   MCHC 31.5 - 36.5 g/dL - - -   MCV 78 - 100 fl - - -   PLATELET COUNT 150 - 450 10e9/L - - -   ESR 0 - 20 mm/h - - -       Assessment:  40 yo F with SLE, stable on plaquenil monotherapy 300 mg daily (6 mg/kg). Mild arthralgias of large joints but tolerable. Unlikely to need additional therapies at this time.    Diagnosis:  1. SLE (MAURICIO+ 1:1280 speckled, Sm+, RNP+, SSA+, Chromatin Ab+, APS negative, low C4 which corresponds to flares as does dsDNA per patient, with raynaud's, arthralgias, malar rash, mild alopecia,  oral ulcers, photosensitivity, neutropenia)  2. Sicca symptoms (dry mouth), resolved  3. Drug monitoring: Plaquenil long term use    Plan:  1. continue plaquenil 300 mg daily.   Plaquenil started at 20 yo   Last eye exam: 11/2021, no evidence of plaquenil toxicity  2. Labs today: CBC w/diff, AST, ALT, Cr, C3, C4, dsDNA, UA, U Pr/Cr    RTC in 1 year    Latoya Cartagena MD, PhD  Rheumatology      Chelsea is a 41 year old who is being evaluated via a billable video visit.      How would you like to obtain your AVS? MyChart  If the video visit is dropped, the invitation should be resent by: Text to cell phone: [phone]  Will anyone else be joining your video visit? No      Video Start Time: 11:30 AM  Video-Visit Details    Type of service:  Video Visit    Video End Time:11:45 AM    Originating Location (pt. Location): Home    Distant Location (provider location):  [facility] [place]     Platform used for Video Visit: Marla Cartagena MD

## 2021-12-06 NOTE — PATIENT INSTRUCTIONS
Let's get labs!  Contact me if joint pain is getting worst or if Raynaud's symptoms start to .  Otherwise, see you in 1 year.

## 2021-12-06 NOTE — PROGRESS NOTES
Chelsea is a 41 year old who is being evaluated via a billable video visit.      How would you like to obtain your AVS? MyChart  If the video visit is dropped, the invitation should be resent by: Text to cell phone: [phone]  Will anyone else be joining your video visit? No      Video Start Time: 11:30 AM  Video-Visit Details    Type of service:  Video Visit    Video End Time:11:45 AM    Originating Location (pt. Location): Home    Distant Location (provider location):  [facility] [place]     Platform used for Video Visit: Marla Cartagena MD

## 2021-12-06 NOTE — PROGRESS NOTES
[facility]  Date: 2021  CC: lupus    Patient Summary: 40 yo F with SSA+ SLE who transfered her care from [facility] (Dr. Tiffany Parkinson) and [facility] (Dr. Conde) and was pregnant with 2nd child at initial visit (10/15/18). More recently she is now s/p 2 pregnancies without any lupus related complications. She was diagnosed at 18 yo initially with raynaud's then in her 20s had a full flare and was diagnosed with lupus. She has not required steroids since 2016 though her disease is very steroid responsive. C4 levels and dsDNA levels do correspond to flares per patient. Before she moved her persistent neutropenia was evaluated via BM biopsy which was unremarkable.    Interval history (2021): overall doing well. Noticing some joint pain with colder weather particularly elbows, hips, and knees but not so much in her hands and wrists. Had a rough couple of months-- had a patch on her scalp which was biopsied and found to be skin cancer. She had moh's surgery for removal and they kept having to take more. She was referred to plastic surgery to complete removal and to put her scalp skin back together. No raynaud's symptoms. No facial rashes. Kids are doing well.    ROS: A comprehensive 14 point ROS was done. Positives are per HPI.    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   General: NAD, very pleasant  HEENT: face symmetric, EOMI  CV: well perfused extremities  Resp: breathing comfortably on room air  MSK: no swelling or limitations in ROM of fingers, knuckles, wrists  Skin: no visible malar rash. lesion on left super scalp, mild erythema, otherwise appears to be healing well.  Psych: congruent mood and affect    Lab Results   Component Value Date/Time    DNA 3 10/19/2020 01:20 PM    DNA 3 12/16/2019 10:40 AM    DNA 3 10/16/2019 10:03 AM     RHEUM RESULTS Latest Ref Rng & Units 1/24/2018 6/1/2018 7/12/2018   ALBUMIN 3.4 - 5.0 g/dL - - -   ALT 0 - 50 U/L - 28 -   AST 0 - 45 U/L - 26 -   COMPLEMENT C3 81 - 157 mg/dL - - -   COMPLEMENT C4 13 - 39 mg/dL - - -   CREATININE 0.52 - 1.04 mg/dL 0.79 0.64 -   CRP 0.0 - 8.0 mg/L - - -   DNA <10 IU/mL - - -   GFR ESTIMATE, IF BLACK >60 mL/min/[1.73:m2] >60 >60 -   GFR ESTIMATE >60 mL/min/1.73m2 >60 >60 -   HEMATOCRIT 35.0 - 47.0 % - - -   HEMOGLOBIN 11.7 - 15.7 g/dL - - -   HEPBANG - - - Negative   WBC 4.0 - 11.0 10e9/L - - -   RBC 3.8 - 5.2 10e12/L - - -   RDW 10.0 - 15.0 % - - -   MCHC 31.5 - 36.5 g/dL - - -   MCV 78 - 100 fl - - -   PLATELET COUNT 150 - 450 10e9/L - - -   ESR 0 - 20 mm/h - - -       Assessment:  40 yo F with SLE, stable on plaquenil monotherapy 300 mg daily (6 mg/kg). Mild arthralgias of large joints but tolerable. Unlikely to need additional therapies at this time.    Diagnosis:  1. SLE (MAURICIO+ 1:1280 speckled, Sm+, RNP+, SSA+, Chromatin Ab+, APS negative, low C4 which corresponds to flares as does dsDNA per patient, with raynaud's, arthralgias, malar rash, mild alopecia, oral ulcers, photosensitivity, neutropenia)  2. Sicca symptoms (dry mouth), resolved  3. Drug monitoring: Plaquenil long term use    Plan:  1. continue plaquenil 300 mg daily.   Plaquenil started at 18 yo   Last eye exam: 11/2021, no evidence of plaquenil toxicity  2. Labs today: CBC w/diff, AST, ALT, Cr, C3,  C4, dsDNA, UA, U Pr/Cr    RTC in 1 year    Latoya Cartagena MD, PhD  Rheumatology

## 2021-12-13 ENCOUNTER — OFFICE VISIT (OUTPATIENT)
Dept: OPHTHALMOLOGY | Facility: CLINIC | Age: 41
End: 2021-12-13
Attending: OPHTHALMOLOGY
Payer: COMMERCIAL

## 2021-12-13 DIAGNOSIS — Z79.899 LONG-TERM USE OF PLAQUENIL: Primary | ICD-10-CM

## 2021-12-13 DIAGNOSIS — Z79.899 LONG-TERM USE OF PLAQUENIL: ICD-10-CM

## 2021-12-13 PROCEDURE — 99207 FUNDUS AUTOFLUORESCENCE IMAGE (FAF) OU (BOTH EYES): CPT | Performed by: OPHTHALMOLOGY

## 2021-12-13 PROCEDURE — 92134 CPTRZ OPH DX IMG PST SGM RTA: CPT | Performed by: OPHTHALMOLOGY

## 2021-12-13 PROCEDURE — G0463 HOSPITAL OUTPT CLINIC VISIT: HCPCS | Mod: 25

## 2021-12-13 PROCEDURE — 92250 FUNDUS PHOTOGRAPHY W/I&R: CPT | Performed by: OPHTHALMOLOGY

## 2021-12-13 PROCEDURE — 92082 INTERMEDIATE VISUAL FIELD XM: CPT | Performed by: OPHTHALMOLOGY

## 2021-12-13 PROCEDURE — 99213 OFFICE O/P EST LOW 20 MIN: CPT | Performed by: OPHTHALMOLOGY

## 2021-12-13 ASSESSMENT — EXTERNAL EXAM - RIGHT EYE: OD_EXAM: NORMAL

## 2021-12-13 ASSESSMENT — SLIT LAMP EXAM - LIDS
COMMENTS: NORMAL
COMMENTS: NORMAL

## 2021-12-13 ASSESSMENT — VISUAL ACUITY
OD_SC: 20/25
OS_SC+: -2
METHOD: SNELLEN - LINEAR
OS_SC: 20/25
OD_SC+: -1

## 2021-12-13 ASSESSMENT — TONOMETRY
OD_IOP_MMHG: 15
IOP_METHOD: ICARE
OS_IOP_MMHG: 15

## 2021-12-13 ASSESSMENT — CUP TO DISC RATIO
OS_RATIO: 0.1
OD_RATIO: 0.1

## 2021-12-13 ASSESSMENT — CONF VISUAL FIELD
OD_NORMAL: 1
METHOD: COUNTING FINGERS
OS_NORMAL: 1

## 2021-12-13 ASSESSMENT — EXTERNAL EXAM - LEFT EYE: OS_EXAM: NORMAL

## 2021-12-13 NOTE — PROGRESS NOTES
CC -   PLAQUENIL     INTERVAL HISTORY - No changes in vision, did not tolerate less than 300 mg/day plaquenil      PMH -   Chelsea Stein is a  41  year old year-old patient referred by Dr Westfall for evaluation and treat of plaquenil use for lupus. 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  None    RETINAL IMAGING:  OCT 12-13-21  OD - retina normal, ?PHF attached  OS - retina normal, ?PHF attached    AF 12-13-21  OD - normal  OS - normal    OVF 10-2  12-13-21  OD - reliable, few spots on pattern  OS - reliable, several spots on pattern        ASSESSMENT & PLAN    # Plaquenil use   - 400 mg/day 7952-9902, 300 mg/day since 2018   - no renal, 108#   - mfERG normal 11/2021     - FAF & OCT normal    - OVF many spots OS ?artifact   - doubt toxicity given recent normal mfERG & imaging     - recheck 1 year   - dosage is slightly higher than 5 mg/kg, consider reducing to 200 mg/day if tolerated   - failed in past      #. Vitreous syneresis OU   - S/Sx RD d/w patient 12/2021        return to clinic: 1 year, OCT/FAF/OVF 10-2 - check OS first    ATTESTATION     Attending Physician Attestation:      Complete documentation of historical and exam elements from today's encounter can be found in the full encounter summary report (not reduplicated in this progress note).  I personally obtained the chief complaint(s) and history of present illness.  I confirmed and edited as necessary the review of systems, past medical/surgical history, family history, social history, and examination findings as documented by others; and I examined the patient myself.  I personally reviewed the relevant tests, images, and reports as  documented above.  I formulated and edited as necessary the assessment and plan and discussed the findings and management plan with the patient and family    Clare Montano MD, PhD  , Vitreoretinal Surgery  Department of Ophthalmology  [facility]

## 2021-12-13 NOTE — NURSING NOTE
Chief Complaints and History of Present Illnesses   Patient presents with     Follow Up     Chief Complaint(s) and History of Present Illness(es)     Follow Up     Laterality: both eyes    Onset: years ago    Associated symptoms: Negative for flashes, floaters and eye pain    Pain scale: 0/10              Comments     1 year follow up for Plaquenil use with testing today.   Patient states vision is stable each eye within the last year. Denies floaters or flashes. Denies eye pain.   Patient is on 300mg daily of Plaquenil.     ADELITA Whipple 9:18 AM 12/13/2021

## 2022-01-07 ENCOUNTER — LAB (OUTPATIENT)
Dept: LAB | Facility: CLINIC | Age: 42
End: 2022-01-07
Payer: COMMERCIAL

## 2022-01-07 DIAGNOSIS — M32.9 SYSTEMIC LUPUS ERYTHEMATOSUS, UNSPECIFIED SLE TYPE, UNSPECIFIED ORGAN INVOLVEMENT STATUS (H): ICD-10-CM

## 2022-01-07 DIAGNOSIS — Z13.220 SCREENING FOR LIPID DISORDERS: ICD-10-CM

## 2022-01-07 DIAGNOSIS — Z13.29 SCREENING FOR THYROID DISORDER: ICD-10-CM

## 2022-01-07 LAB
ALBUMIN UR-MCNC: ABNORMAL MG/DL
APPEARANCE UR: CLEAR
BACTERIA #/AREA URNS HPF: ABNORMAL /HPF
BASOPHILS # BLD AUTO: 0 10E3/UL (ref 0–0.2)
BASOPHILS NFR BLD AUTO: 0 %
BILIRUB UR QL STRIP: NEGATIVE
COLOR UR AUTO: YELLOW
CREAT UR-MCNC: 171 MG/DL
CRP SERPL-MCNC: <2.9 MG/L (ref 0–8)
EOSINOPHIL # BLD AUTO: 0 10E3/UL (ref 0–0.7)
EOSINOPHIL NFR BLD AUTO: 1 %
ERYTHROCYTE [DISTWIDTH] IN BLOOD BY AUTOMATED COUNT: 12.5 % (ref 10–15)
GLUCOSE UR STRIP-MCNC: NEGATIVE MG/DL
HCT VFR BLD AUTO: 36.3 % (ref 35–47)
HGB BLD-MCNC: 12.3 G/DL (ref 11.7–15.7)
HGB UR QL STRIP: ABNORMAL
KETONES UR STRIP-MCNC: NEGATIVE MG/DL
LEUKOCYTE ESTERASE UR QL STRIP: NEGATIVE
LYMPHOCYTES # BLD AUTO: 0.9 10E3/UL (ref 0.8–5.3)
LYMPHOCYTES NFR BLD AUTO: 42 %
MCH RBC QN AUTO: 31.2 PG (ref 26.5–33)
MCHC RBC AUTO-ENTMCNC: 33.9 G/DL (ref 31.5–36.5)
MCV RBC AUTO: 92 FL (ref 78–100)
MONOCYTES # BLD AUTO: 0.3 10E3/UL (ref 0–1.3)
MONOCYTES NFR BLD AUTO: 15 %
NEUTROPHILS # BLD AUTO: 0.9 10E3/UL (ref 1.6–8.3)
NEUTROPHILS NFR BLD AUTO: 43 %
NITRATE UR QL: NEGATIVE
PH UR STRIP: 6.5 [PH] (ref 5–7)
PLATELET # BLD AUTO: 177 10E3/UL (ref 150–450)
PROT UR-MCNC: 0.33 G/L
PROT/CREAT 24H UR: 0.19 G/G CR (ref 0–0.2)
RBC # BLD AUTO: 3.94 10E6/UL (ref 3.8–5.2)
RBC #/AREA URNS AUTO: ABNORMAL /HPF
SP GR UR STRIP: >=1.03 (ref 1–1.03)
SQUAMOUS #/AREA URNS AUTO: ABNORMAL /LPF
UROBILINOGEN UR STRIP-ACNC: 0.2 E.U./DL
WBC # BLD AUTO: 2.2 10E3/UL (ref 4–11)
WBC #/AREA URNS AUTO: ABNORMAL /HPF

## 2022-01-07 PROCEDURE — 84443 ASSAY THYROID STIM HORMONE: CPT

## 2022-01-07 PROCEDURE — 86140 C-REACTIVE PROTEIN: CPT

## 2022-01-07 PROCEDURE — 86160 COMPLEMENT ANTIGEN: CPT

## 2022-01-07 PROCEDURE — 86160 COMPLEMENT ANTIGEN: CPT | Mod: 59

## 2022-01-07 PROCEDURE — 84075 ASSAY ALKALINE PHOSPHATASE: CPT

## 2022-01-07 PROCEDURE — 84439 ASSAY OF FREE THYROXINE: CPT

## 2022-01-07 PROCEDURE — 81001 URINALYSIS AUTO W/SCOPE: CPT

## 2022-01-07 PROCEDURE — 80061 LIPID PANEL: CPT

## 2022-01-07 PROCEDURE — 85025 COMPLETE CBC W/AUTO DIFF WBC: CPT

## 2022-01-07 PROCEDURE — 82565 ASSAY OF CREATININE: CPT

## 2022-01-07 PROCEDURE — 84450 TRANSFERASE (AST) (SGOT): CPT

## 2022-01-07 PROCEDURE — 36415 COLL VENOUS BLD VENIPUNCTURE: CPT

## 2022-01-07 PROCEDURE — 86225 DNA ANTIBODY NATIVE: CPT

## 2022-01-07 PROCEDURE — 84156 ASSAY OF PROTEIN URINE: CPT

## 2022-01-07 PROCEDURE — 84460 ALANINE AMINO (ALT) (SGPT): CPT

## 2022-01-08 LAB
ALP SERPL-CCNC: 56 U/L (ref 40–150)
ALT SERPL W P-5'-P-CCNC: 24 U/L (ref 0–50)
AST SERPL W P-5'-P-CCNC: 24 U/L (ref 0–45)
CHOLEST SERPL-MCNC: 110 MG/DL
CREAT SERPL-MCNC: 0.76 MG/DL (ref 0.52–1.04)
FASTING STATUS PATIENT QL REPORTED: NO
GFR SERPL CREATININE-BSD FRML MDRD: >90 ML/MIN/1.73M2
HDLC SERPL-MCNC: 52 MG/DL
LDLC SERPL CALC-MCNC: 41 MG/DL
NONHDLC SERPL-MCNC: 58 MG/DL
T4 FREE SERPL-MCNC: 0.96 NG/DL (ref 0.76–1.46)
TRIGL SERPL-MCNC: 83 MG/DL
TSH SERPL DL<=0.005 MIU/L-ACNC: 4.19 MU/L (ref 0.4–4)

## 2022-01-10 LAB
C3 SERPL-MCNC: 87 MG/DL (ref 81–157)
C4 SERPL-MCNC: 15 MG/DL (ref 13–39)
DSDNA AB SER-ACNC: 5.3 IU/ML

## 2022-03-04 ENCOUNTER — APPOINTMENT (OUTPATIENT)
Dept: ULTRASOUND IMAGING | Facility: CLINIC | Age: 42
End: 2022-03-04
Attending: EMERGENCY MEDICINE
Payer: COMMERCIAL

## 2022-03-04 ENCOUNTER — HOSPITAL ENCOUNTER (EMERGENCY)
Facility: CLINIC | Age: 42
Discharge: HOME | End: 2022-03-04
Attending: EMERGENCY MEDICINE | Admitting: EMERGENCY MEDICINE
Payer: COMMERCIAL

## 2022-03-04 VITALS
OXYGEN SATURATION: 100 % | WEIGHT: 105 LBS | HEART RATE: 78 BPM | DIASTOLIC BLOOD PRESSURE: 61 MMHG | HEIGHT: 63 IN | SYSTOLIC BLOOD PRESSURE: 102 MMHG | TEMPERATURE: 98.5 F | RESPIRATION RATE: 16 BRPM | BODY MASS INDEX: 18.61 KG/M2

## 2022-03-04 DIAGNOSIS — E83.51 HYPOCALCEMIA: ICD-10-CM

## 2022-03-04 DIAGNOSIS — D72.819 LEUKOPENIA, UNSPECIFIED TYPE: ICD-10-CM

## 2022-03-04 DIAGNOSIS — M79.622 PAIN OF LEFT UPPER ARM: ICD-10-CM

## 2022-03-04 LAB
ANION GAP SERPL CALCULATED.3IONS-SCNC: 3 MMOL/L (ref 3–14)
ATRIAL RATE - MUSE: 82 BPM
BASOPHILS # BLD MANUAL: 0 10E3/UL (ref 0–0.2)
BASOPHILS NFR BLD MANUAL: 0 %
BUN SERPL-MCNC: 15 MG/DL (ref 7–30)
CALCIUM SERPL-MCNC: 8 MG/DL (ref 8.5–10.1)
CHLORIDE BLD-SCNC: 107 MMOL/L (ref 94–109)
CO2 SERPL-SCNC: 28 MMOL/L (ref 20–32)
CREAT SERPL-MCNC: 0.65 MG/DL (ref 0.52–1.04)
DIASTOLIC BLOOD PRESSURE - MUSE: NORMAL MMHG
ELLIPTOCYTES BLD QL SMEAR: SLIGHT
EOSINOPHIL # BLD MANUAL: 0 10E3/UL (ref 0–0.7)
EOSINOPHIL NFR BLD MANUAL: 0 %
ERYTHROCYTE [DISTWIDTH] IN BLOOD BY AUTOMATED COUNT: 12 % (ref 10–15)
GFR SERPL CREATININE-BSD FRML MDRD: >90 ML/MIN/1.73M2
GLUCOSE BLD-MCNC: 86 MG/DL (ref 70–99)
HCT VFR BLD AUTO: 37.6 % (ref 35–47)
HGB BLD-MCNC: 12.4 G/DL (ref 11.7–15.7)
INTERPRETATION ECG - MUSE: NORMAL
LYMPHOCYTES # BLD MANUAL: 1.6 10E3/UL (ref 0.8–5.3)
LYMPHOCYTES NFR BLD MANUAL: 69 %
MCH RBC QN AUTO: 30.2 PG (ref 26.5–33)
MCHC RBC AUTO-ENTMCNC: 33 G/DL (ref 31.5–36.5)
MCV RBC AUTO: 92 FL (ref 78–100)
MONOCYTES # BLD MANUAL: 0.3 10E3/UL (ref 0–1.3)
MONOCYTES NFR BLD MANUAL: 11 %
NEUTROPHILS # BLD MANUAL: 0.5 10E3/UL (ref 1.6–8.3)
NEUTROPHILS NFR BLD MANUAL: 20 %
P AXIS - MUSE: 67 DEGREES
PLAT MORPH BLD: ABNORMAL
PLATELET # BLD AUTO: 181 10E3/UL (ref 150–450)
POTASSIUM BLD-SCNC: 3.8 MMOL/L (ref 3.4–5.3)
PR INTERVAL - MUSE: 146 MS
QRS DURATION - MUSE: 74 MS
QT - MUSE: 366 MS
QTC - MUSE: 427 MS
R AXIS - MUSE: 6 DEGREES
RBC # BLD AUTO: 4.1 10E6/UL (ref 3.8–5.2)
RBC MORPH BLD: ABNORMAL
SODIUM SERPL-SCNC: 138 MMOL/L (ref 133–144)
SYSTOLIC BLOOD PRESSURE - MUSE: NORMAL MMHG
T AXIS - MUSE: 46 DEGREES
TROPONIN I SERPL HS-MCNC: 4 NG/L
VENTRICULAR RATE- MUSE: 82 BPM
WBC # BLD AUTO: 2.3 10E3/UL (ref 4–11)

## 2022-03-04 PROCEDURE — 85014 HEMATOCRIT: CPT | Performed by: EMERGENCY MEDICINE

## 2022-03-04 PROCEDURE — 36415 COLL VENOUS BLD VENIPUNCTURE: CPT | Performed by: EMERGENCY MEDICINE

## 2022-03-04 PROCEDURE — 99285 EMERGENCY DEPT VISIT HI MDM: CPT | Mod: 25

## 2022-03-04 PROCEDURE — 84484 ASSAY OF TROPONIN QUANT: CPT | Performed by: EMERGENCY MEDICINE

## 2022-03-04 PROCEDURE — 82310 ASSAY OF CALCIUM: CPT | Performed by: EMERGENCY MEDICINE

## 2022-03-04 PROCEDURE — 93005 ELECTROCARDIOGRAM TRACING: CPT

## 2022-03-04 PROCEDURE — 93971 EXTREMITY STUDY: CPT | Mod: LT

## 2022-03-05 NOTE — ED PROVIDER NOTES
"  History     Chief Complaint:    Arm Pain       HPI   Chelsea Stein is a 41 year old female who presents with vague upper arm achiness and pain from upper arm down to the forearm.  No significant swelling redness or rash.  Achy and sore relatively constant not worse with movement.  Patient does have prior history of lupus and takes Plaquenil for that.  No prior history of acute coronary syndrome.  Non-smoker, no prior history of blood clots.  No numbness of the arm..    Allergies:  Benadryl [Diphenhydramine]  Seasonal Allergies     Medications:    hydroxychloroquine (PLAQUENIL) 200 MG tablet        Past Medical History:    Past Medical History:   Diagnosis Date     Lupus (systemic lupus erythematosus) (H)        Patient Active Problem List    Diagnosis Date Noted     Abdominal pain 2018     Priority: Medium     History of  delivery, antepartum 10/30/2018     Priority: Medium     Planning TOLAC- needs to sign consent       Supervision of high-risk pregnancy of elderly multigravida 10/17/2018     Priority: Medium     Systemic lupus erythematosus (H) 10/17/2018     Priority: Medium     Cervical polyp 10/17/2018     Priority: Medium     Leukopenia 10/17/2018     Priority: Medium        Past Surgical History:    Past Surgical History:   Procedure Laterality Date      SECTION  2014        Family History:    family history is not on file.    Social History:   reports that she has never smoked. She has never used smokeless tobacco. She reports current alcohol use. She reports that she does not use drugs.    PCP: Jeff Burden     Review of Systems   All other systems reviewed and are negative.        Physical Exam     Patient Vitals for the past 24 hrs:   BP Temp Temp src Pulse Resp SpO2 Height Weight   22 1819 102/61 98.5  F (36.9  C) Temporal 78 16 100 % 1.6 m (5' 3\") 47.6 kg (105 lb)        Physical Exam  Gen: well appearing, in no acute distress  Oral : Mucous membranes moist, "   Nose: No rhinorhea  Ears: External near normal, without drainage  Eyes: periorbital tissues and sclera normal   Neck: supple, no abnormal swelling  Lungs:   no resp distress, speaks full sentences  CV: Regular rate, regular rhythm  Ext: no lower extremity edema  Skin: warm, dry, well perfused, no rashes/bruising/lesions on exposed skin  Neuro: alert, no gross motor or sensory deficits,   Psych: pleasant mood, normal affect    Emergency Department Course     ECG   Sinus rate 82, sinus arrhythmia nonspecific T inversion lead V3.  QTc 427, QRS 74.    Imaging:    US Upper Extremity Venous Duplex Left   Final Result   IMPRESSION:    1.  No deep venous thrombosis in the left upper extremity.           Laboratory:    Labs Ordered and Resulted from Time of ED Arrival to Time of ED Departure   BASIC METABOLIC PANEL - Abnormal       Result Value    Sodium 138      Potassium 3.8      Chloride 107      Carbon Dioxide (CO2) 28      Anion Gap 3      Urea Nitrogen 15      Creatinine 0.65      Calcium 8.0 (*)     Glucose 86      GFR Estimate >90     CBC WITH PLATELETS AND DIFFERENTIAL - Abnormal    WBC Count 2.3 (*)     RBC Count 4.10      Hemoglobin 12.4      Hematocrit 37.6      MCV 92      MCH 30.2      MCHC 33.0      RDW 12.0      Platelet Count 181     DIFFERENTIAL - Abnormal    % Neutrophils 20      % Lymphocytes 69      % Monocytes 11      % Eosinophils 0      % Basophils 0      Absolute Neutrophils 0.5 (*)     Absolute Lymphocytes 1.6      Absolute Monocytes 0.3      Absolute Eosinophils 0.0      Absolute Basophils 0.0      RBC Morphology Confirmed RBC Indices      Platelet Assessment        Value: Automated Count Confirmed. Platelet morphology is normal.    Elliptocytes Slight (*)    TROPONIN I - Normal    Troponin I High Sensitivity 4          Procedures:    Interventions:    Medications - No data to display     Emergency Department Course:  Past medical records, nursing notes, and vitals reviewed.  I performed an exam  of the patient and obtained history, as documented above.    I rechecked the patient. Findings and plan explained to the Patient    Impression & Plan          CMS Diagnoses:       Medical Decision Making:  Patient presents with vague left upper arm pain.  Exam was pretty benign.  Ultrasound negative.  Seems unlikely to be ACS given her risk factor profile, reassuring EKG and normal troponin.  Likely musculoskeletal will recommend outpatient follow-up with PCP, heating pads Tylenol for now.  Patient is comfortable with discharge plan.  Discussed slightly decreased calcium and chronically low white blood cell count.  Patient will start a multivitamin.        Diagnosis:    ICD-10-CM    1. Pain of left upper arm  M79.622    2. Hypocalcemia  E83.51    3. Leukopenia, unspecified type  D72.819         Discharge Medications:  New Prescriptions    No medications on file        3/4/2022   George Hinson,*        George Hinson MD  03/04/22 2136

## 2022-03-26 ENCOUNTER — HEALTH MAINTENANCE LETTER (OUTPATIENT)
Age: 42
End: 2022-03-26

## 2022-03-31 NOTE — PROGRESS NOTES
SUBJECTIVE:   CC: Chelsea Stein is an 41 year old woman who presents for preventive health visit.     Patient has been advised of split billing requirements and indicates understanding: Yes  Healthy Habits:     Getting at least 3 servings of Calcium per day:  Yes    Bi-annual eye exam:  Yes    Dental care twice a year:  Yes    Sleep apnea or symptoms of sleep apnea:  None    Diet:  Regular (no restrictions)    Frequency of exercise:  6-7 days/week    Duration of exercise:  45-60 minutes    Taking medications regularly:  Yes    PHQ-2 Total Score: 0    Additional concerns today:  Yes    Pt would like to have ears examined. Feels like there is clogging in both ears and would like to see if it is wax related.    Today's PHQ-2 Score:   PHQ-2 ( 1999 [employer]) 4/1/2022   Q1: Little interest or pleasure in doing things 0   Q2: Feeling down, depressed or hopeless 0   PHQ-2 Score 0   PHQ-2 Total Score (12-17 Years)- Positive if 3 or more points; Administer PHQ-A if positive -   Q1: Little interest or pleasure in doing things Not at all   Q2: Feeling down, depressed or hopeless Not at all   PHQ-2 Score 0       Abuse: Current or Past (Physical, Sexual or Emotional) - No  Do you feel safe in your environment? Yes    Have you ever done Advance Care Planning? (For example, a Health Directive, POLST, or a discussion with a medical provider or your loved ones about your wishes): No, advance care planning information given to patient to review.  Patient declined advance care planning discussion at this time.    Social History     Tobacco Use     Smoking status: Never Smoker     Smokeless tobacco: Never Used   Substance Use Topics     Alcohol use: Yes     Comment: 1-2 glasses of wine a week.      If you drink alcohol do you typically have >3 drinks per day or >7 drinks per week? No    Alcohol Use 4/1/2022   Prescreen: >3 drinks/day or >7 drinks/week? No   Prescreen: >3 drinks/day or >7 drinks/week? -   No flowsheet data  found.    Reviewed orders with patient.  Reviewed health maintenance and updated orders accordingly - Yes  Lab work is in process    Breast Cancer Screening:  Any new diagnosis of family breast, ovarian, or bowel cancer? No    Pertinent mammograms are reviewed under the imaging tab.    History of abnormal Pap smear: NO - age 30- 65 PAP every 3 years recommended  PAP / HPV Latest Ref Rng & Units 2/2/2021   PAP (Historical) - NIL   HPV16 NEG:Negative Negative   HPV18 NEG:Negative Negative   HRHPV NEG:Negative Negative     Reviewed and updated as needed this visit by clinical staff      Problems    Fam Hx          Reviewed and updated as needed this visit by Provider      Problems    Fam Hx             Review of Systems   Constitutional: Negative for chills and fever.   HENT: Negative for congestion, ear pain, hearing loss and sore throat.    Eyes: Negative for pain and visual disturbance.   Respiratory: Negative for cough and shortness of breath.    Cardiovascular: Negative for chest pain, palpitations and peripheral edema.   Gastrointestinal: Negative for abdominal pain, constipation, diarrhea, heartburn, hematochezia and nausea.   Breasts:  Negative for tenderness, breast mass and discharge.   Genitourinary: Negative for dysuria, frequency, genital sores, hematuria, pelvic pain, urgency, vaginal bleeding and vaginal discharge.   Musculoskeletal: Positive for arthralgias. Negative for joint swelling and myalgias.   Skin: Negative for rash.   Neurological: Negative for dizziness, weakness, headaches and paresthesias.   Psychiatric/Behavioral: Negative for mood changes. The patient is not nervous/anxious.      CONSTITUTIONAL: NEGATIVE for fever, chills, change in weight  INTEGUMENTARU/SKIN: NEGATIVE for worrisome rashes, moles or lesions  EYES: NEGATIVE for vision changes or irritation  ENT: NEGATIVE for ear, mouth and throat problems  RESP: NEGATIVE for significant cough or SOB  BREAST: NEGATIVE for masses,  "tenderness or discharge  CV: NEGATIVE for chest pain, palpitations or peripheral edema  GI: NEGATIVE for nausea, abdominal pain, heartburn, or change in bowel habits  : NEGATIVE for unusual urinary or vaginal symptoms. Periods are regular.  MUSCULOSKELETAL: NEGATIVE for significant arthralgias or myalgia  NEURO: NEGATIVE for weakness, dizziness or paresthesias  PSYCHIATRIC: NEGATIVE for changes in mood or affect     OBJECTIVE:   BP 93/61   Pulse 79   Temp 96.9  F (36.1  C) (Tympanic)   Ht 1.623 m (5' 3.9\")   Wt 50.1 kg (110 lb 6.4 oz)   LMP 03/25/2022   SpO2 100%   BMI 19.01 kg/m    Physical Exam  GENERAL: healthy, alert and no distress  EYES: Eyes grossly normal to inspection, PERRL and conjunctivae and sclerae normal  HENT: ear canals and TM's normal, nose and mouth without ulcers or lesions  NECK: no adenopathy, no asymmetry, masses, or scars and thyroid normal to palpation  RESP: lungs clear to auscultation - no rales, rhonchi or wheezes  BREAST: normal without masses, tenderness or nipple discharge and no palpable axillary masses or adenopathy  CV: regular rate and rhythm, normal S1 S2, no S3 or S4, no murmur, click or rub, no peripheral edema and peripheral pulses strong  ABDOMEN: soft, nontender, no hepatosplenomegaly, no masses and bowel sounds normal  MS: no gross musculoskeletal defects noted, no edema  SKIN: no suspicious lesions or rashes  NEURO: Normal strength and tone, mentation intact and speech normal  PSYCH: mentation appears normal, affect normal/bright    Diagnostic Test Results:  Labs reviewed in Epic  No results found for this or any previous visit (from the past 24 hour(s)).    ASSESSMENT/PLAN:       ICD-10-CM    1. Routine general medical examination at a health care facility  Z00.00    2. Subclinical hypothyroidism  E03.8 TSH     T3, Free     T4, free     TSH     T3, Free     T4, free    noted during routine labs on 01/2022. Repeat test is pending.    3. Encounter for screening " "mammogram for malignant neoplasm of breast  Z12.31 MA Screening Digital Bilateral   4. Impacted cerumen of left ear  H61.22    5. Systemic lupus erythematosus, unspecified SLE type, unspecified organ involvement status (H)  M32.9    6. Squamous cell carcinoma in situ (SCCIS) of scalp  D04.4     seen by Derm. S/p MOH's and local chemo.      Patient has been advised of split billing requirements and indicates understanding: Yes    COUNSELING:  Reviewed preventive health counseling, as reflected in patient instructions       Regular exercise       Healthy diet/nutrition    Estimated body mass index is 19.01 kg/m  as calculated from the following:    Height as of this encounter: 1.623 m (5' 3.9\").    Weight as of this encounter: 50.1 kg (110 lb 6.4 oz).    She reports that she has never smoked. She has never used smokeless tobacco.      Counseling Resources:  ATP IV Guidelines  Pooled Cohorts Equation Calculator  Breast Cancer Risk Calculator  BRCA-Related Cancer Risk Assessment: FHS-7 Tool  FRAX Risk Assessment  ICSI Preventive Guidelines  Dietary Guidelines for Americans, 2010  USDA's MyPlate  ASA Prophylaxis  Lung CA Screening    Leonie Gonzalez MD  [facility]  "

## 2022-04-01 ENCOUNTER — OFFICE VISIT (OUTPATIENT)
Dept: FAMILY MEDICINE | Facility: CLINIC | Age: 42
End: 2022-04-01
Payer: COMMERCIAL

## 2022-04-01 VITALS
HEIGHT: 64 IN | DIASTOLIC BLOOD PRESSURE: 61 MMHG | OXYGEN SATURATION: 100 % | BODY MASS INDEX: 18.85 KG/M2 | WEIGHT: 110.4 LBS | HEART RATE: 79 BPM | TEMPERATURE: 96.9 F | SYSTOLIC BLOOD PRESSURE: 93 MMHG

## 2022-04-01 DIAGNOSIS — E03.8 SUBCLINICAL HYPOTHYROIDISM: ICD-10-CM

## 2022-04-01 DIAGNOSIS — H61.22 IMPACTED CERUMEN OF LEFT EAR: ICD-10-CM

## 2022-04-01 DIAGNOSIS — D04.4 SQUAMOUS CELL CARCINOMA IN SITU (SCCIS) OF SCALP: ICD-10-CM

## 2022-04-01 DIAGNOSIS — Z00.00 ROUTINE GENERAL MEDICAL EXAMINATION AT A HEALTH CARE FACILITY: Primary | ICD-10-CM

## 2022-04-01 DIAGNOSIS — Z12.31 ENCOUNTER FOR SCREENING MAMMOGRAM FOR MALIGNANT NEOPLASM OF BREAST: ICD-10-CM

## 2022-04-01 DIAGNOSIS — M32.9 SYSTEMIC LUPUS ERYTHEMATOSUS, UNSPECIFIED SLE TYPE, UNSPECIFIED ORGAN INVOLVEMENT STATUS (H): ICD-10-CM

## 2022-04-01 PROBLEM — D23.4: Status: ACTIVE | Noted: 2022-04-01

## 2022-04-01 PROBLEM — D23.4: Status: RESOLVED | Noted: 2022-04-01 | Resolved: 2022-04-01

## 2022-04-01 LAB — T3FREE SERPL-MCNC: 2.8 PG/ML (ref 2.3–4.2)

## 2022-04-01 PROCEDURE — 84443 ASSAY THYROID STIM HORMONE: CPT | Performed by: FAMILY MEDICINE

## 2022-04-01 PROCEDURE — 99396 PREV VISIT EST AGE 40-64: CPT | Performed by: FAMILY MEDICINE

## 2022-04-01 PROCEDURE — 84439 ASSAY OF FREE THYROXINE: CPT | Performed by: FAMILY MEDICINE

## 2022-04-01 PROCEDURE — 36415 COLL VENOUS BLD VENIPUNCTURE: CPT | Performed by: FAMILY MEDICINE

## 2022-04-01 PROCEDURE — 84481 FREE ASSAY (FT-3): CPT | Performed by: FAMILY MEDICINE

## 2022-04-01 ASSESSMENT — ENCOUNTER SYMPTOMS
ABDOMINAL PAIN: 0
CHILLS: 0
SORE THROAT: 0
HEMATURIA: 0
JOINT SWELLING: 0
ARTHRALGIAS: 1
PALPITATIONS: 0
BREAST MASS: 0
SHORTNESS OF BREATH: 0
DYSURIA: 0
HEARTBURN: 0
HEMATOCHEZIA: 0
CONSTIPATION: 0
NAUSEA: 0
NERVOUS/ANXIOUS: 0
MYALGIAS: 0
EYE PAIN: 0
FREQUENCY: 0
WEAKNESS: 0
PARESTHESIAS: 0
DIZZINESS: 0
FEVER: 0
DIARRHEA: 0
COUGH: 0
HEADACHES: 0

## 2022-04-03 LAB
T4 FREE SERPL-MCNC: 0.88 NG/DL (ref 0.76–1.46)
TSH SERPL DL<=0.005 MIU/L-ACNC: 3.75 MU/L (ref 0.4–4)

## 2022-04-19 ENCOUNTER — TELEPHONE (OUTPATIENT)
Dept: RHEUMATOLOGY | Facility: CLINIC | Age: 42
End: 2022-04-19
Payer: COMMERCIAL

## 2022-04-19 DIAGNOSIS — M32.9 SYSTEMIC LUPUS COMPLICATING PREGNANCY (H): ICD-10-CM

## 2022-04-19 DIAGNOSIS — O99.891 SYSTEMIC LUPUS COMPLICATING PREGNANCY (H): ICD-10-CM

## 2022-04-22 RX ORDER — HYDROXYCHLOROQUINE SULFATE 200 MG/1
TABLET, FILM COATED ORAL
Qty: 180 TABLET | Refills: 1 | Status: SHIPPED | OUTPATIENT
Start: 2022-04-22 | End: 2023-05-22

## 2022-04-22 NOTE — TELEPHONE ENCOUNTER
Latoya Cartagena MD Beard, Madeline, RN  Caller: Unspecified (3 days ago, 12:10 PM)  300 mg daily

## 2022-05-26 NOTE — PROGRESS NOTES
Assessment & Plan     DUB (dysfunctional uterine bleeding)  .  Differential diagnosis for dysfunctional uterine bleeding were discussed with the patient.  These include structural abnormalities, hormonal abnormalities, or unknown causes. We will start with FSH level to check if the patient is perimenopausal and pelvic US.   Plan:  - Follicle stimulating hormone; Future  - US Pelvic Complete with Transvaginal; Future    Return in about 1 month (around 6/27/2022) for Follow-up visit.    Leonie Gonzalez MD  [facility]    Bob Tran is a 42 year old who presents for the following health issues:    History of Present Illness       Reason for visit:  Strange menstrual cycle  Symptom onset:  1-2 weeks ago  Symptoms include:  Cramping, bleeding, back pain    She eats 4 or more servings of fruits and vegetables daily.She consumes 0 sweetened beverage(s) daily.She exercises with enough effort to increase her heart rate 30 to 60 minutes per day.  She exercises with enough effort to increase her heart rate 5 days per week.   She is taking medications regularly.     Menstrual Concern  Onset/Duration: 1-2 Weeks ago   Description:   Duration of bleeding episodes: 6 days  Frequency of periods: (1st day of one to 1st day of next):  inconsistent  Describe bleeding/flow:   Clots:  no   Number of pads/day: 1        Cramping: moderate - started on Sunday 1 week after it started.   Accompanying Signs & Symptoms:  Lightheadedness: no  Temperature intolerance: no  Nosebleeds/Easy bruising: no  Vaginal Discharge: no  Acne: no  Change in body hair: no  History:  Patient's last menstrual period was 05/20/2022.  Previous normal periods: YES  Contraceptive use: condoms  Sexually active: no  Any bleeding after intercourse: no  Abnormal PAP Smears: no  Precipitating or alleviating factors: None  Therapies tried and outcome: None    Started her cycle on 05/15/2022. Spotting daily then on 05/25/2022 started to have  heavy flow. She denies any pelvic pain or vaginal discharge.     Review of Systems   Constitutional, HEENT, cardiovascular, pulmonary, gi and gu systems are negative, except as otherwise noted.      Objective    BP 97/66   Pulse 87   Temp 98  F (36.7  C) (Tympanic)   Resp 16   Wt 50.3 kg (111 lb)   LMP 05/20/2022   SpO2 99%   BMI 19.11 kg/m    Body mass index is 19.11 kg/m .  Physical Exam   GENERAL: healthy, alert and no distress  NECK: no adenopathy, no asymmetry, masses, or scars and thyroid normal to palpation  RESP: lungs clear to auscultation - no rales, rhonchi or wheezes  CV: regular rate and rhythm, normal S1 S2, no S3 or S4, no murmur, click or rub, no peripheral edema and peripheral pulses strong  ABDOMEN: soft, nontender, no hepatosplenomegaly, no masses and bowel sounds normal  MS: no gross musculoskeletal defects noted, no edema    Results for orders placed or performed in visit on 05/27/22   Follicle stimulating hormone     Status: None   Result Value Ref Range    FSH 19.1 IU/L

## 2022-05-27 ENCOUNTER — OFFICE VISIT (OUTPATIENT)
Dept: FAMILY MEDICINE | Facility: CLINIC | Age: 42
End: 2022-05-27
Payer: COMMERCIAL

## 2022-05-27 VITALS
WEIGHT: 111 LBS | OXYGEN SATURATION: 99 % | TEMPERATURE: 98 F | RESPIRATION RATE: 16 BRPM | SYSTOLIC BLOOD PRESSURE: 97 MMHG | HEART RATE: 87 BPM | BODY MASS INDEX: 19.11 KG/M2 | DIASTOLIC BLOOD PRESSURE: 66 MMHG

## 2022-05-27 DIAGNOSIS — N93.8 DUB (DYSFUNCTIONAL UTERINE BLEEDING): Primary | ICD-10-CM

## 2022-05-27 LAB — FSH SERPL-ACNC: 19.1 IU/L

## 2022-05-27 PROCEDURE — 83001 ASSAY OF GONADOTROPIN (FSH): CPT | Performed by: FAMILY MEDICINE

## 2022-05-27 PROCEDURE — 36415 COLL VENOUS BLD VENIPUNCTURE: CPT | Performed by: FAMILY MEDICINE

## 2022-05-27 PROCEDURE — 99214 OFFICE O/P EST MOD 30 MIN: CPT | Performed by: FAMILY MEDICINE

## 2022-05-27 NOTE — PATIENT INSTRUCTIONS
You can call to schedule radiology tests at the following numbers:    [facility] (including mammograms, ultrasound, CT and MRI scans and Dexa Scans)    Call   Toll Free     [facility] (including mammograms, ultrasound, CT and MRI scans and Dexa Scans)    Call   Toll Free

## 2022-05-27 NOTE — NURSING NOTE
"Chief Complaint   Patient presents with     Abnormal Bleeding Problem     Spotting and 6 weeks between periods     initial BP 97/66   Pulse 87   Temp 98  F (36.7  C) (Tympanic)   Resp 16   Wt 50.3 kg (111 lb)   LMP 05/20/2022   SpO2 99%   BMI 19.11 kg/m   Estimated body mass index is 19.11 kg/m  as calculated from the following:    Height as of 4/1/22: 1.623 m (5' 3.9\").    Weight as of this encounter: 50.3 kg (111 lb).  BP completed using cuff size: regular.   R arm      Health Maintenance that is potentially due pending provider review:  NONE    n/a    Radames Cardenas ma  "

## 2022-09-18 ENCOUNTER — HEALTH MAINTENANCE LETTER (OUTPATIENT)
Age: 42
End: 2022-09-18

## 2023-01-31 ENCOUNTER — ANCILLARY PROCEDURE (OUTPATIENT)
Dept: ULTRASOUND IMAGING | Facility: CLINIC | Age: 43
End: 2023-01-31
Attending: FAMILY MEDICINE
Payer: COMMERCIAL

## 2023-01-31 DIAGNOSIS — N93.8 DUB (DYSFUNCTIONAL UTERINE BLEEDING): ICD-10-CM

## 2023-01-31 PROCEDURE — 76856 US EXAM PELVIC COMPLETE: CPT | Performed by: OBSTETRICS & GYNECOLOGY

## 2023-01-31 PROCEDURE — 76830 TRANSVAGINAL US NON-OB: CPT | Performed by: OBSTETRICS & GYNECOLOGY

## 2023-02-06 NOTE — RESULT ENCOUNTER NOTE
I need additional information.     I would like to discuss it over the phone, can we schedule a telephone or video visit?      Patient was seen in may 2022 for irregular menstrual cycles.  Her pelvic ultrasound revealed endometrial thickness of 9.7 mm.  This is normal if the patient is expecting a cycle within a few days.  Please inquire about patient's most recent menstrual cycle.     MD Carlos

## 2023-02-07 ENCOUNTER — TELEPHONE (OUTPATIENT)
Dept: FAMILY MEDICINE | Facility: CLINIC | Age: 43
End: 2023-02-07
Payer: COMMERCIAL

## 2023-02-07 NOTE — TELEPHONE ENCOUNTER
Left message for patient to call [facility] back  When patient calls back please transfer to an RN  Marilyn AUSTIN RN

## 2023-02-07 NOTE — TELEPHONE ENCOUNTER
----- Message from Leonie Gonzalez MD sent at 2/6/2023  5:48 PM CST -----  I need additional information.     I would like to discuss it over the phone, can we schedule a telephone or video visit?      Patient was seen in may 2022 for irregular menstrual cycles.  Her pelvic ultrasound revealed endometrial thickness of 9.7 mm.  This is normal if the patient is expecting a cycle within a few days.  Please inquire about patient's most recent menstrual cycle.     MD Carlos

## 2023-02-08 ENCOUNTER — HOSPITAL ENCOUNTER (OUTPATIENT)
Dept: MAMMOGRAPHY | Facility: CLINIC | Age: 43
Discharge: HOME | End: 2023-02-08
Attending: FAMILY MEDICINE | Admitting: FAMILY MEDICINE
Payer: COMMERCIAL

## 2023-02-08 DIAGNOSIS — Z12.31 ENCOUNTER FOR SCREENING MAMMOGRAM FOR MALIGNANT NEOPLASM OF BREAST: ICD-10-CM

## 2023-02-08 PROCEDURE — 77067 SCR MAMMO BI INCL CAD: CPT

## 2023-02-09 ENCOUNTER — TELEPHONE (OUTPATIENT)
Dept: FAMILY MEDICINE | Facility: CLINIC | Age: 43
End: 2023-02-09
Payer: COMMERCIAL

## 2023-02-09 NOTE — TELEPHONE ENCOUNTER
Appointment on February 23,2023 is appropriate.  We will talk about her ultrasound in detail at that time.    MD Carlos

## 2023-02-09 NOTE — TELEPHONE ENCOUNTER
FS,  Patient returned call  States she did have her menstrual cycle during ultrasound  Is unsure what results mean, saw on mychart  Next virtual visit available 2/23/23  Ok to schedule then, or is earlier recommended?  Thanks,  Marilyn AUSTIN RN

## 2023-02-09 NOTE — TELEPHONE ENCOUNTER
Pt calling the clinic back. Informed pt that the clinic was trying to call her to set up a appointment.    Please call the pt back with time and date for a appointment to discuss imaging. Thanks    Vicki Henson RN  [facility]

## 2023-02-27 ENCOUNTER — VIRTUAL VISIT (OUTPATIENT)
Dept: FAMILY MEDICINE | Facility: CLINIC | Age: 43
End: 2023-02-27
Payer: COMMERCIAL

## 2023-02-27 DIAGNOSIS — H61.22 IMPACTED CERUMEN OF LEFT EAR: ICD-10-CM

## 2023-02-27 DIAGNOSIS — R93.89 ENDOMETRIAL THICKENING ON ULTRASOUND: Primary | ICD-10-CM

## 2023-02-27 PROCEDURE — 99213 OFFICE O/P EST LOW 20 MIN: CPT | Mod: VID | Performed by: FAMILY MEDICINE

## 2023-02-27 ASSESSMENT — ANXIETY QUESTIONNAIRES
5. BEING SO RESTLESS THAT IT IS HARD TO SIT STILL: NOT AT ALL
2. NOT BEING ABLE TO STOP OR CONTROL WORRYING: NOT AT ALL
GAD7 TOTAL SCORE: 0
7. FEELING AFRAID AS IF SOMETHING AWFUL MIGHT HAPPEN: NOT AT ALL
4. TROUBLE RELAXING: NOT AT ALL
GAD7 TOTAL SCORE: 0
7. FEELING AFRAID AS IF SOMETHING AWFUL MIGHT HAPPEN: NOT AT ALL
GAD7 TOTAL SCORE: 0
6. BECOMING EASILY ANNOYED OR IRRITABLE: NOT AT ALL
3. WORRYING TOO MUCH ABOUT DIFFERENT THINGS: NOT AT ALL
IF YOU CHECKED OFF ANY PROBLEMS ON THIS QUESTIONNAIRE, HOW DIFFICULT HAVE THESE PROBLEMS MADE IT FOR YOU TO DO YOUR WORK, TAKE CARE OF THINGS AT HOME, OR GET ALONG WITH OTHER PEOPLE: NOT DIFFICULT AT ALL
1. FEELING NERVOUS, ANXIOUS, OR ON EDGE: NOT AT ALL
8. IF YOU CHECKED OFF ANY PROBLEMS, HOW DIFFICULT HAVE THESE MADE IT FOR YOU TO DO YOUR WORK, TAKE CARE OF THINGS AT HOME, OR GET ALONG WITH OTHER PEOPLE?: NOT DIFFICULT AT ALL

## 2023-02-27 ASSESSMENT — PATIENT HEALTH QUESTIONNAIRE - PHQ9
SUM OF ALL RESPONSES TO PHQ QUESTIONS 1-9: 0
SUM OF ALL RESPONSES TO PHQ QUESTIONS 1-9: 0
10. IF YOU CHECKED OFF ANY PROBLEMS, HOW DIFFICULT HAVE THESE PROBLEMS MADE IT FOR YOU TO DO YOUR WORK, TAKE CARE OF THINGS AT HOME, OR GET ALONG WITH OTHER PEOPLE: NOT DIFFICULT AT ALL

## 2023-02-27 NOTE — PROGRESS NOTES
Chelsea is a 42 year old who is being evaluated via a billable video visit.      How would you like to obtain your AVS? MyChart  If the video visit is dropped, the invitation should be resent by: Text to cell phone: [phone]  Will anyone else be joining your video visit? No    Assessment & Plan     Chelsea was seen today for results.    Diagnoses and all orders for this visit:    Endometrial thickening on ultrasound  -Results of recent ultrasound were discussed with the patient.  Patient states that she was expecting a cycle 3 days after the ultrasound.  Menstrual history was reviewed.  Cycles are regular.  Advised patient to schedule an appointment if she notices new symptoms.    Impacted cerumen of left ear  -     Adult ENT  Referral; Future          Return in about 2 months (around 4/27/2023) for Physical Exam.    Leonie Gonzalez MD  [facility]   Chelsea is a 42 year old, presenting for the following health issues:  Results (Review recent results)      History of Present Illness       Reason for visit:  Cysts    She eats 4 or more servings of fruits and vegetables daily.She consumes 0 sweetened beverage(s) daily.She exercises with enough effort to increase her heart rate 30 to 60 minutes per day.  She exercises with enough effort to increase her heart rate 6 days per week.   She is taking medications regularly.    Today's PHQ-9         PHQ-9 Total Score: 0    PHQ-9 Q9 Thoughts of better off dead/self-harm past 2 weeks :   Not at all    How difficult have these problems made it for you to do your work, take care of things at home, or get along with other people: Not difficult at all  Today's BAUDILIO-7 Score: 0     Patient had irregular cycles with pelvic pain.   Period was semi-regular. Cycles are once every 22-24 days. FDLMP: 02/06/2022. Patient had a cycle every month. Patient was expecting a cycle three days after her ultrasound.     Fullness in the left ear. Tried  Debrox without significant improvement. Desires to see ENT    Review of Systems   Constitutional, HEENT, cardiovascular, pulmonary, gi and gu systems are negative, except as otherwise noted.      Objective           Vitals:  No vitals were obtained today due to virtual visit.    Physical Exam   GENERAL: Healthy, alert and no distress  EYES: Eyes grossly normal to inspection.  No discharge or erythema, or obvious scleral/conjunctival abnormalities.  RESP: No audible wheeze, cough, or visible cyanosis.  No visible retractions or increased work of breathing.    SKIN: Visible skin clear. No significant rash, abnormal pigmentation or lesions.  NEURO: Cranial nerves grossly intact.  Mentation and speech appropriate for age.  PSYCH: Mentation appears normal, affect normal/bright, judgement and insight intact, normal speech and appearance well-groomed.    No results found for any visits on 02/27/23.          Video-Visit Details    Type of service:  Video Visit     Originating Location (pt. Location): Home    Distant Location (provider location):  On-site  Platform used for Video Visit: Marla

## 2023-03-03 NOTE — TELEPHONE ENCOUNTER
FUTURE VISIT INFORMATION      FUTURE VISIT INFORMATION:    Date: 3/7/23     Time: 12:30    Location: [facility]  REFERRAL INFORMATION:    Referring provider:  Leonie Gonzalez MD    Referring providers clinic:  [facility]    Reason for visit/diagnosis  Impacted cerumen of left ear, ear wax removal, no hearing concerns, apt per Pt    RECORDS REQUESTED FROM:       Clinic name Comments Records Status Imaging Status   [facility] 2/27/23- note wLeonie Samson MD epic    [facility] 4/12/22- note with Ashley Palma PA   CE

## 2023-03-07 ENCOUNTER — OFFICE VISIT (OUTPATIENT)
Dept: OTOLARYNGOLOGY | Facility: CLINIC | Age: 43
End: 2023-03-07
Attending: FAMILY MEDICINE
Payer: COMMERCIAL

## 2023-03-07 ENCOUNTER — PRE VISIT (OUTPATIENT)
Dept: OTOLARYNGOLOGY | Facility: CLINIC | Age: 43
End: 2023-03-07

## 2023-03-07 DIAGNOSIS — H61.22 IMPACTED CERUMEN OF LEFT EAR: ICD-10-CM

## 2023-03-07 PROCEDURE — 69210 REMOVE IMPACTED EAR WAX UNI: CPT | Mod: LT | Performed by: REGISTERED NURSE

## 2023-03-07 NOTE — PROGRESS NOTES
Otolaryngology Clinic  March 7, 2023    HPI:  Chelsea Stein is here for left cerumen impaction removal. Left ear has felt more plugged over the past few months. Attempted ear irrigation in PCP clinic without improvement.    Patient denies any otalgia, otorrhea, history of frequent ear infections, or ear surgeries.      Otologic microscope exam:    Biocular Microscopy exam is needed due to deep impaction of cerumen of bilateral ears, requiring direct visualization for use of cleaning instruments.    Right ear was examined under the microscope.  Ear canal is clean and dry, free of cerumen. Normal appearing TM, nicely aerated middle ear space.     Left ear was also examined under the microscope.  Complete cerumen impaction noted. It was cleaned with suction and alligator forceps. Once cleaned, TM visualized under microscope. Normal appearing TM, nicely aerated middle ear space.     The patient noted improvement of symptoms.    Assessment and Plan:  1. Impacted cerumen of left ear  Patient presents with cerumen impaction of left ear.  The patient's ears are cleaned today. Return as needed for cleaning.      Gladys Redman DNP, APRN, CNP  Otolaryngology  Head & Neck Surgery  [phone]    15 minutes spent on the date of the encounter doing chart review, history and exam, documentation and further activities per the note excluding time performing ear cleaning under microscopy.

## 2023-03-07 NOTE — LETTER
3/7/2023       RE: Chelsea Stein  [address] [place] [address]     Dear Colleague,    Thank you for referring your patient, Chelsea Stein, to the [facility] [place] [facility]. Please see a copy of my visit note below.      Otolaryngology Clinic  March 7, 2023    HPI:  Chelsea Stein is here for left cerumen impaction removal. Left ear has felt more plugged over the past few months. Attempted ear irrigation in PCP clinic without improvement.    Patient denies any otalgia, otorrhea, history of frequent ear infections, or ear surgeries.      Otologic microscope exam:    Biocular Microscopy exam is needed due to deep impaction of cerumen of bilateral ears, requiring direct visualization for use of cleaning instruments.    Right ear was examined under the microscope.  Ear canal is clean and dry, free of cerumen. Normal appearing TM, nicely aerated middle ear space.     Left ear was also examined under the microscope.  Complete cerumen impaction noted. It was cleaned with suction and alligator forceps. Once cleaned, TM visualized under microscope. Normal appearing TM, nicely aerated middle ear space.     The patient noted improvement of symptoms.    Assessment and Plan:  1. Impacted cerumen of left ear  Patient presents with cerumen impaction of left ear.  The patient's ears are cleaned today. Return as needed for cleaning.      Gladys Redman DNP, APRN, CNP  Otolaryngology  Head & Neck Surgery  [phone]    15 minutes spent on the date of the encounter doing chart review, history and exam, documentation and further activities per the note excluding time performing ear cleaning under microscopy.

## 2023-05-01 ENCOUNTER — VIRTUAL VISIT (OUTPATIENT)
Dept: RHEUMATOLOGY | Facility: CLINIC | Age: 43
End: 2023-05-01
Attending: STUDENT IN AN ORGANIZED HEALTH CARE EDUCATION/TRAINING PROGRAM
Payer: COMMERCIAL

## 2023-05-01 DIAGNOSIS — O99.891 SYSTEMIC LUPUS COMPLICATING PREGNANCY (H): Primary | ICD-10-CM

## 2023-05-01 DIAGNOSIS — M32.9 SYSTEMIC LUPUS COMPLICATING PREGNANCY (H): Primary | ICD-10-CM

## 2023-05-01 PROCEDURE — 99213 OFFICE O/P EST LOW 20 MIN: CPT | Mod: VID | Performed by: STUDENT IN AN ORGANIZED HEALTH CARE EDUCATION/TRAINING PROGRAM

## 2023-05-01 NOTE — LETTER
2023       RE: Chelsea Stein  [address] [place] [address]     Dear Colleague,    Thank you for referring your patient, Chelsea Stein, to the [facility] at [facility]. Please see a copy of my visit note below.    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 Tiffany Parkinson) and [facility] (Dr. Conde) and was pregnant with 2nd child at initial visit (10/15/18). More recently she is now s/p 2 pregnancies without any lupus related complications. She was diagnosed at 18 yo initially with raynaud's then in her 20s had a full flare and was diagnosed with lupus. She has not required steroids since 2016 though her disease is very steroid responsive. C4 levels and dsDNA levels do correspond to flares per patient. Before she moved her persistent neutropenia was evaluated via BM biopsy which was unremarkable.    Interval history (2023): overall doing well. Has been having abnormal periods. Recent period was 30 days long. Scalp is healing well. And a little but of hair is regrowing. No lupus specific symptoms but has had days on and off when she is more achy and tired.    ROS: A comprehensive 14 point ROS was done. Positives are per HPI.    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   General: NAD, very pleasant  HEENT: face symmetric, EOMI  CV: well perfused extremities  Resp: breathing comfortably on room air  MSK: no swelling or limitations in ROM of fingers, knuckles, wrists  Skin: no visible malar rash. lesion on left super scalp has little hair growth but is not erythematous.  Psych: congruent mood and affect    Lab Results   Component Value Date/Time    DNA 5.3 01/07/2022 01:41 PM    DNA 3 10/19/2020 01:20 PM    DNA 3 12/16/2019 10:40 AM    DNA 3 10/16/2019 10:03 AM         Latest Ref Rng & Units 8/27/2021    10:56 AM 1/7/2022     1:41 PM 3/4/2022     8:36 PM   RHEUM RESULTS   ALT 0 - 50 U/L  24      AST 0 - 45 U/L  24      Complement C3 81 - 157 mg/dL  87      Complement C4 13 - 39 mg/dL  15      Creatinine 0.52 - 1.04 mg/dL 0.76   0.76   0.65     CRP Inflammation 0.0 - 8.0 mg/L  <2.9      DNA-ds <10.0 IU/mL  5.3      GFR Estimate >60 mL/min/1.73m2 >90   >90   >90     Hematocrit 35.0 - 47.0 %  36.3   37.6     Hemoglobin 11.7 - 15.7 g/dL 13.2   12.3   12.4     WBC 4.0 - 11.0 10e3/uL  2.2   2.3     RBC Count 3.80 - 5.20 10e6/uL  3.94   4.10     RDW 10.0 - 15.0 %  12.5   12.0     MCHC 31.5 - 36.5 g/dL  33.9   33.0     MCV 78 - 100 fL  92   92     Platelet Count 150 - 450 10e3/uL  177   181         Assessment:  43 yo F with SLE, stable on plaquenil monotherapy 300 mg daily (6 mg/kg). Mild arthralgias of large joints but tolerable. Unlikely to need additional therapies at this time. Eye exam pending.    Diagnosis:  1. SLE (MAURICIO+ 1:1280 speckled, Sm+, RNP+, SSA+, Chromatin Ab+, APS negative, low C4 which corresponds to flares as does dsDNA per patient, with raynaud's, arthralgias, malar rash, mild alopecia, oral ulcers, photosensitivity, neutropenia)  2. Sicca symptoms (dry mouth), resolved  3. Drug monitoring: Plaquenil long term use    Plan:  1. continue plaquenil 300 mg  daily.   Plaquenil started at 18 yo   Last eye exam: 12/2021, no evidence of plaquenil toxicity  2. Labs today: CBC w/diff, AST, ALT, Cr, C3, C4, dsDNA, UA, U Pr/Cr    RTC in 1 year    Latoya Cartagena MD, PhD  Rheumatology    20 min were spent on the day of visit reviewing history, chart, and coordinating care.      Virtual Visit Details  Start: 2:15  Stop: 2:31  Type of service:  Video Visit     Originating Location (pt. Location): Home    Distant Location (provider location):  On-site  Platform used for Video Visit: Marla

## 2023-05-01 NOTE — PROGRESS NOTES
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 Tiffany Parkinson) and [facility] (Dr. Conde) and was pregnant with 2nd child at initial visit (10/15/18). More recently she is now s/p 2 pregnancies without any lupus related complications. She was diagnosed at 20 yo initially with raynaud's then in her 20s had a full flare and was diagnosed with lupus. She has not required steroids since 2016 though her disease is very steroid responsive. C4 levels and dsDNA levels do correspond to flares per patient. Before she moved her persistent neutropenia was evaluated via BM biopsy which was unremarkable.    Interval history (2023): overall doing well. Has been having abnormal periods. Recent period was 30 days long. Scalp is healing well. And a little but of hair is regrowing. No lupus specific symptoms but has had days on and off when she is more achy and tired.    ROS: A comprehensive 14 point ROS was done. Positives are per HPI.    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   General: NAD, very pleasant  HEENT: face symmetric, EOMI  CV: well perfused extremities  Resp: breathing comfortably on room air  MSK: no swelling or limitations in ROM of fingers, knuckles,  wrists  Skin: no visible malar rash. lesion on left super scalp has little hair growth but is not erythematous.  Psych: congruent mood and affect    Lab Results   Component Value Date/Time    DNA 5.3 01/07/2022 01:41 PM    DNA 3 10/19/2020 01:20 PM    DNA 3 12/16/2019 10:40 AM    DNA 3 10/16/2019 10:03 AM         Latest Ref Rng & Units 8/27/2021    10:56 AM 1/7/2022     1:41 PM 3/4/2022     8:36 PM   RHEUM RESULTS   ALT 0 - 50 U/L  24      AST 0 - 45 U/L  24      Complement C3 81 - 157 mg/dL  87      Complement C4 13 - 39 mg/dL  15      Creatinine 0.52 - 1.04 mg/dL 0.76   0.76   0.65     CRP Inflammation 0.0 - 8.0 mg/L  <2.9      DNA-ds <10.0 IU/mL  5.3      GFR Estimate >60 mL/min/1.73m2 >90   >90   >90     Hematocrit 35.0 - 47.0 %  36.3   37.6     Hemoglobin 11.7 - 15.7 g/dL 13.2   12.3   12.4     WBC 4.0 - 11.0 10e3/uL  2.2   2.3     RBC Count 3.80 - 5.20 10e6/uL  3.94   4.10     RDW 10.0 - 15.0 %  12.5   12.0     MCHC 31.5 - 36.5 g/dL  33.9   33.0     MCV 78 - 100 fL  92   92     Platelet Count 150 - 450 10e3/uL  177   181         Assessment:  41 yo F with SLE, stable on plaquenil monotherapy 300 mg daily (6 mg/kg). Mild arthralgias of large joints but tolerable. Unlikely to need additional therapies at this time. Eye exam pending.    Diagnosis:  1. SLE (MAURICIO+ 1:1280 speckled, Sm+, RNP+, SSA+, Chromatin Ab+, APS negative, low C4 which corresponds to flares as does dsDNA per patient, with raynaud's, arthralgias, malar rash, mild alopecia, oral ulcers, photosensitivity, neutropenia)  2. Sicca symptoms (dry mouth), resolved  3. Drug monitoring: Plaquenil long term use    Plan:  1. continue plaquenil 300 mg daily.   Plaquenil started at 20 yo   Last eye exam: 12/2021, no evidence of plaquenil toxicity  2. Labs today: CBC w/diff, AST, ALT, Cr, C3, C4, dsDNA, UA, U Pr/Cr    RTC in 1 year    Latoya Cartagena MD, PhD  Rheumatology    20 min were spent on the day of visit reviewing history, chart, and coordinating  care.      Virtual Visit Details  Start: 2:15  Stop: 2:31  Type of service:  Video Visit     Originating Location (pt. Location): Home    Distant Location (provider location):  On-site  Platform used for Video Visit: Marla

## 2023-05-01 NOTE — PATIENT INSTRUCTIONS
No changes at this time!  Hope the question of perimenopause gets figured out.  Labs in the next month will include vitamin D    See you in 1 year or sooner if needed!

## 2023-05-01 NOTE — NURSING NOTE
Is the patient currently in the state of [place]? YES    Visit mode:VIDEO    If the visit is dropped, the patient can be reconnected by: VIDEO VISIT: Text to cell phone: [phone]    Will anyone else be joining the visit? NO      How would you like to obtain your AVS? MyChart    Are changes needed to the allergy or medication list? NO    Reason for visit: Video Visit

## 2023-05-05 ENCOUNTER — LAB (OUTPATIENT)
Dept: LAB | Facility: CLINIC | Age: 43
End: 2023-05-05
Payer: COMMERCIAL

## 2023-05-05 DIAGNOSIS — M32.9 SYSTEMIC LUPUS COMPLICATING PREGNANCY (H): ICD-10-CM

## 2023-05-05 DIAGNOSIS — O99.891 SYSTEMIC LUPUS COMPLICATING PREGNANCY (H): ICD-10-CM

## 2023-05-05 PROCEDURE — 36415 COLL VENOUS BLD VENIPUNCTURE: CPT

## 2023-05-05 PROCEDURE — 82306 VITAMIN D 25 HYDROXY: CPT

## 2023-05-06 ENCOUNTER — HEALTH MAINTENANCE LETTER (OUTPATIENT)
Age: 43
End: 2023-05-06

## 2023-05-07 LAB — DEPRECATED CALCIDIOL+CALCIFEROL SERPL-MC: 21 UG/L (ref 20–75)

## 2023-05-22 DIAGNOSIS — O99.891 SYSTEMIC LUPUS COMPLICATING PREGNANCY (H): ICD-10-CM

## 2023-05-22 DIAGNOSIS — M32.9 SYSTEMIC LUPUS COMPLICATING PREGNANCY (H): ICD-10-CM

## 2023-05-24 RX ORDER — HYDROXYCHLOROQUINE SULFATE 300 MG/1
300 TABLET ORAL DAILY
Qty: 90 TABLET | Refills: 0 | Status: SHIPPED | OUTPATIENT
Start: 2023-05-24 | End: 2023-06-19 | Stop reason: DRUGHIGH

## 2023-05-24 RX ORDER — HYDROXYCHLOROQUINE SULFATE 300 MG/1
300 TABLET ORAL DAILY
Qty: 90 TABLET | Refills: 0 | Status: SHIPPED | OUTPATIENT
Start: 2023-05-24 | End: 2023-05-24

## 2023-05-24 NOTE — TELEPHONE ENCOUNTER
Medication/Dose: hydroxychloroquine (PLAQUENIL) 200 MG tablet    Last Written : 4/22/22  Last Quantity: 180, # refills: 1  Last Office Visit :  5/1/23  Pending appointment: 5/6/24     Last Eye Exam: 12/13/21 P with Dr Montano  (Reviewed Plaquenil Flow sheet, Care Everywhere, EMR, and Media.)  Pending Eye exam: None scheduled.    Prescription did not meet protocol, and routed to provider and scheduling as pt is overdue for Eye screening.    Chirag Worthy, DENTONN, RN  [facility] Refill Team

## 2023-05-24 NOTE — TELEPHONE ENCOUNTER
[facility] pharmacy calling to clarify the prescription for hydroxychloroquine (Plaquenil) that was just sent.    Clarification provided, per last visit not on 5/01 patient is to take hydroxychloroquine (Plaquenil) 300mg   Previous script is for hydroxychloroquine (Plaquenil) 200mg tab, take 1.5 tabs daily  New script is for hydroxychloroquine (Plaquenil) 300mg tab and should be 1 tab daily.  Prescription rewritten and signed within the scope of practice for the correction.    Plan:  1. continue plaquenil 300 mg daily.             Plaquenil started at 18 yo    Monika Nam RN  Rheumatology Clinic

## 2023-05-24 NOTE — TELEPHONE ENCOUNTER
Pharmacy call back for clarification on the doses. It 1.5 pills but it is only 300 mg. Can call Pharmacy back or resend the prescription.

## 2023-05-26 NOTE — TELEPHONE ENCOUNTER
Left voicemail for Pt to reminder her that she is due for an eye exam and to call her eye clinic to schedule an appt.

## 2023-06-13 ENCOUNTER — TELEPHONE (OUTPATIENT)
Dept: RHEUMATOLOGY | Facility: CLINIC | Age: 43
End: 2023-06-13
Payer: COMMERCIAL

## 2023-06-14 NOTE — TELEPHONE ENCOUNTER
Pt calling to check on the reason for the delay with this medication (her pharmacy just told her the delay was on our end). Explained to pt that this medication requires a prior authorization, and that PA is in-process. Pt understands now that once this PA has been submitted and if approved, then she would be able to refill this medication.     Pt is hoping we can expedite this process if possible? Has already been out of her medication for several days at this point.     FYI-pt also reports that she now has a future eye exam scheduled.     Routed to [facility] RHEUMATOLOGY [facility]ULT [facility]

## 2023-06-14 NOTE — TELEPHONE ENCOUNTER
PA Initiation    Medication: HYDROXYCHLOROQUINE SULFATE 300 MG PO TABS  Insurance Company: [employer] - Phone [phone] Fax [phone]  Pharmacy Filling the Rx: [id number]  [place] - [address] AT [address] [place] [address] & [facility]  Filling Pharmacy Phone: [phone]  Filling Pharmacy Fax: [phone]  Start Date: 6/14/2023

## 2023-06-15 NOTE — TELEPHONE ENCOUNTER
PRIOR AUTHORIZATION DENIED    Medication: HYDROXYCHLOROQUINE SULFATE 300 MG PO TABS    Insurance Company: [employer] - Phone [phone] Fax [phone]    Denial Date: 6/15/2023    Denial Rational: 300mg tables are excluded    Appeal Information:

## 2023-06-16 ENCOUNTER — MYC REFILL (OUTPATIENT)
Dept: RHEUMATOLOGY | Facility: CLINIC | Age: 43
End: 2023-06-16
Payer: COMMERCIAL

## 2023-06-16 DIAGNOSIS — M32.9 SYSTEMIC LUPUS COMPLICATING PREGNANCY (H): ICD-10-CM

## 2023-06-16 DIAGNOSIS — O99.891 SYSTEMIC LUPUS COMPLICATING PREGNANCY (H): ICD-10-CM

## 2023-06-16 RX ORDER — HYDROXYCHLOROQUINE SULFATE 300 MG/1
300 TABLET ORAL DAILY
Qty: 90 TABLET | Refills: 0 | Status: CANCELLED | OUTPATIENT
Start: 2023-06-16

## 2023-06-19 RX ORDER — HYDROXYCHLOROQUINE SULFATE 200 MG/1
TABLET, FILM COATED ORAL
Qty: 120 TABLET | Refills: 0 | Status: SHIPPED | OUTPATIENT
Start: 2023-06-19 | End: 2023-09-15

## 2023-06-19 NOTE — TELEPHONE ENCOUNTER
Hydroxychloroquine 300 mg is a plan exclusion and will not be covered. Sent in hydroxychloroquine 200 mg tablets as replacement. Recommend 400 mg M,W,F and 200 mg all other days. Sent per MTM CPA. LM with patient to review directions and explain insurance denial.    Aminata Saul, PharmD  Medication Therapy Management Pharmacist  [facility]

## 2023-06-23 ENCOUNTER — LAB (OUTPATIENT)
Dept: LAB | Facility: CLINIC | Age: 43
End: 2023-06-23
Payer: COMMERCIAL

## 2023-06-23 DIAGNOSIS — M32.9 SYSTEMIC LUPUS ERYTHEMATOSUS, UNSPECIFIED SLE TYPE, UNSPECIFIED ORGAN INVOLVEMENT STATUS (H): ICD-10-CM

## 2023-06-23 LAB
ALBUMIN MFR UR ELPH: 26 MG/DL
ALBUMIN UR-MCNC: 30 MG/DL
ALP SERPL-CCNC: 62 U/L (ref 35–104)
ALT SERPL W P-5'-P-CCNC: 28 U/L (ref 0–50)
APPEARANCE UR: CLEAR
AST SERPL W P-5'-P-CCNC: 38 U/L (ref 0–45)
BACTERIA #/AREA URNS HPF: ABNORMAL /HPF
BASOPHILS # BLD AUTO: 0 10E3/UL (ref 0–0.2)
BASOPHILS NFR BLD AUTO: 0 %
BILIRUB UR QL STRIP: ABNORMAL
COLOR UR AUTO: YELLOW
CREAT SERPL-MCNC: 0.82 MG/DL (ref 0.51–0.95)
CREAT UR-MCNC: 256 MG/DL
CRP SERPL-MCNC: <3 MG/L
EOSINOPHIL # BLD AUTO: 0 10E3/UL (ref 0–0.7)
EOSINOPHIL NFR BLD AUTO: 0 %
ERYTHROCYTE [DISTWIDTH] IN BLOOD BY AUTOMATED COUNT: 11.8 % (ref 10–15)
GFR SERPL CREATININE-BSD FRML MDRD: >90 ML/MIN/1.73M2
GLUCOSE UR STRIP-MCNC: NEGATIVE MG/DL
HCT VFR BLD AUTO: 40 % (ref 35–47)
HGB BLD-MCNC: 13.2 G/DL (ref 11.7–15.7)
HGB UR QL STRIP: NEGATIVE
IMM GRANULOCYTES # BLD: 0 10E3/UL
IMM GRANULOCYTES NFR BLD: 0 %
KETONES UR STRIP-MCNC: NEGATIVE MG/DL
LEUKOCYTE ESTERASE UR QL STRIP: NEGATIVE
LYMPHOCYTES # BLD AUTO: 1 10E3/UL (ref 0.8–5.3)
LYMPHOCYTES NFR BLD AUTO: 32 %
MCH RBC QN AUTO: 30.8 PG (ref 26.5–33)
MCHC RBC AUTO-ENTMCNC: 33 G/DL (ref 31.5–36.5)
MCV RBC AUTO: 93 FL (ref 78–100)
MONOCYTES # BLD AUTO: 0.3 10E3/UL (ref 0–1.3)
MONOCYTES NFR BLD AUTO: 8 %
NEUTROPHILS # BLD AUTO: 1.9 10E3/UL (ref 1.6–8.3)
NEUTROPHILS NFR BLD AUTO: 60 %
NITRATE UR QL: NEGATIVE
PH UR STRIP: 6 [PH] (ref 5–7)
PLATELET # BLD AUTO: 151 10E3/UL (ref 150–450)
PROT/CREAT 24H UR: 0.1 MG/MG CR (ref 0–0.2)
RBC # BLD AUTO: 4.29 10E6/UL (ref 3.8–5.2)
RBC #/AREA URNS AUTO: ABNORMAL /HPF
SP GR UR STRIP: >=1.03 (ref 1–1.03)
SQUAMOUS #/AREA URNS AUTO: ABNORMAL /LPF
UROBILINOGEN UR STRIP-ACNC: 0.2 E.U./DL
WBC # BLD AUTO: 3.2 10E3/UL (ref 4–11)
WBC #/AREA URNS AUTO: ABNORMAL /HPF

## 2023-06-23 PROCEDURE — 84460 ALANINE AMINO (ALT) (SGPT): CPT

## 2023-06-23 PROCEDURE — 86160 COMPLEMENT ANTIGEN: CPT

## 2023-06-23 PROCEDURE — 84450 TRANSFERASE (AST) (SGOT): CPT

## 2023-06-23 PROCEDURE — 86225 DNA ANTIBODY NATIVE: CPT

## 2023-06-23 PROCEDURE — 85025 COMPLETE CBC W/AUTO DIFF WBC: CPT

## 2023-06-23 PROCEDURE — 86140 C-REACTIVE PROTEIN: CPT

## 2023-06-23 PROCEDURE — 82565 ASSAY OF CREATININE: CPT

## 2023-06-23 PROCEDURE — 81001 URINALYSIS AUTO W/SCOPE: CPT

## 2023-06-23 PROCEDURE — 84156 ASSAY OF PROTEIN URINE: CPT

## 2023-06-23 PROCEDURE — 36415 COLL VENOUS BLD VENIPUNCTURE: CPT

## 2023-06-23 PROCEDURE — 84075 ASSAY ALKALINE PHOSPHATASE: CPT

## 2023-06-26 LAB
C3 SERPL-MCNC: 97 MG/DL (ref 81–157)
C4 SERPL-MCNC: 15 MG/DL (ref 13–39)
DSDNA AB SER-ACNC: 3.5 IU/ML

## 2023-07-25 DIAGNOSIS — Z79.899 LONG-TERM USE OF PLAQUENIL: Primary | ICD-10-CM

## 2023-08-08 ENCOUNTER — OFFICE VISIT (OUTPATIENT)
Dept: OPHTHALMOLOGY | Facility: CLINIC | Age: 43
End: 2023-08-08
Attending: OPHTHALMOLOGY
Payer: COMMERCIAL

## 2023-08-08 DIAGNOSIS — Z79.899 LONG-TERM USE OF PLAQUENIL: ICD-10-CM

## 2023-08-08 PROCEDURE — G0463 HOSPITAL OUTPT CLINIC VISIT: HCPCS | Performed by: OPHTHALMOLOGY

## 2023-08-08 PROCEDURE — 99214 OFFICE O/P EST MOD 30 MIN: CPT | Performed by: OPHTHALMOLOGY

## 2023-08-08 PROCEDURE — 99207 FUNDUS AUTOFLUORESCENCE IMAGE (FAF) OU (BOTH EYES): CPT | Mod: 26 | Performed by: OPHTHALMOLOGY

## 2023-08-08 PROCEDURE — 92250 FUNDUS PHOTOGRAPHY W/I&R: CPT | Performed by: OPHTHALMOLOGY

## 2023-08-08 PROCEDURE — 92082 INTERMEDIATE VISUAL FIELD XM: CPT | Performed by: OPHTHALMOLOGY

## 2023-08-08 PROCEDURE — 92134 CPTRZ OPH DX IMG PST SGM RTA: CPT | Performed by: OPHTHALMOLOGY

## 2023-08-08 ASSESSMENT — CONF VISUAL FIELD
METHOD: COUNTING FINGERS
OS_INFERIOR_NASAL_RESTRICTION: 0
OD_INFERIOR_NASAL_RESTRICTION: 0
OD_NORMAL: 1
OD_SUPERIOR_NASAL_RESTRICTION: 0
OD_INFERIOR_TEMPORAL_RESTRICTION: 0
OD_SUPERIOR_TEMPORAL_RESTRICTION: 0
OS_NORMAL: 1
OS_SUPERIOR_TEMPORAL_RESTRICTION: 0
OS_INFERIOR_TEMPORAL_RESTRICTION: 0
OS_SUPERIOR_NASAL_RESTRICTION: 0

## 2023-08-08 ASSESSMENT — SLIT LAMP EXAM - LIDS
COMMENTS: NORMAL
COMMENTS: NORMAL

## 2023-08-08 ASSESSMENT — VISUAL ACUITY
OD_SC: 20/20
METHOD: SNELLEN - LINEAR
OS_SC: 20/20

## 2023-08-08 ASSESSMENT — EXTERNAL EXAM - RIGHT EYE: OD_EXAM: NORMAL

## 2023-08-08 ASSESSMENT — CUP TO DISC RATIO
OS_RATIO: 0.1
OD_RATIO: 0.1

## 2023-08-08 ASSESSMENT — EXTERNAL EXAM - LEFT EYE: OS_EXAM: NORMAL

## 2023-08-08 ASSESSMENT — TONOMETRY
IOP_METHOD: ICARE
OS_IOP_MMHG: 14
OD_IOP_MMHG: 15

## 2023-08-08 NOTE — PROGRESS NOTES
CC -   PLAQUENIL     INTERVAL HISTORY - No changes in vision,  did not tolerate less than 300 mg/day plaquenil (alternating)      PMH -   Chelsea Stein is a  43 year old  patient referred by Dr Westfall for evaluation and treat of plaquenil use for lupus. She is taking 300mg daily since   Plaquenil usage 400 mg/day 0089-3112, then 300 mg/day since 2018  Was on/off for ~ 10 years prior to 2012 with intermittent use for few months at time during few flares  Weight 108#, no renal disease, no tamoxifen  Has been getting regular monitoring overall in [facility] [place]    Clinical psychologist    PAST OCULAR SURGERY  None    RETINAL IMAGING:  OCT 08/08/23   OD - retina normal, ?PHF attached  OS - retina normal, ?PHF attached    AF 12-13-21  OD - normal  OS - normal    OVF 10-2 08/08/23   OD - reliable, normal  OS - reliable, few spots on pattern        ASSESSMENT & PLAN    # Plaquenil use   - 400 mg/day [id number], 300 mg/day since 2018   - no renal, 108#   - mfERG normal 11/2021     - FAF & OCT normal    - OVF spots OS ?artifact    - doubt toxicity given recent normal mfERG & imaging     - recheck 1 year   - dosage is slightly higher than 5 mg/kg, consider reducing to 200 mg/day if tolerated   - failed in past      #. Vitreous syneresis OU   - S/Sx RD d/w patient 08/08/23       ATTESTATION     Attending Physician Attestation:      Complete documentation of historical and exam elements from today's encounter can be found in the full encounter summary report (not reduplicated in this progress note).  I personally obtained the chief complaint(s) and history of present illness.  I confirmed and edited as necessary the review of systems, past medical/surgical history, family history, social history, and examination findings as documented by others; and I examined the patient myself.  I personally reviewed the relevant tests, images, and reports as documented above.  I formulated and edited as necessary the assessment and plan  and discussed the findings and management plan with the patient and family    Clare Montano MD, PhD  , Vitreoretinal Surgery  Department of Ophthalmology  [facility]

## 2023-08-08 NOTE — NURSING NOTE
Chief Complaints and History of Present Illnesses   Patient presents with    Follow Tx Of High Risk Med     Chief Complaint(s) and History of Present Illness(es)       Follow Tx Of High Risk Med              Laterality: both eyes    Course: stable    Associated symptoms: Negative for eye pain, flashes and floaters    Treatments tried: no treatments              Comments    Chelsea Stein is a(n) 43 year old female who presents for a plaquenil exam. Last eye exam was 2 year(s) ago. Since exam, vision is about the same. No lubricating drops. No flashes and floaters. No eye pain.     Plaquenil 300mg daily  No results found for: A1C    Joon Cabrera COT 9:01 AM August 8, 2023

## 2023-09-15 DIAGNOSIS — O99.891 SYSTEMIC LUPUS COMPLICATING PREGNANCY (H): ICD-10-CM

## 2023-09-15 DIAGNOSIS — M32.9 SYSTEMIC LUPUS COMPLICATING PREGNANCY (H): ICD-10-CM

## 2023-09-16 RX ORDER — HYDROXYCHLOROQUINE SULFATE 200 MG/1
TABLET, FILM COATED ORAL
Qty: 120 TABLET | Refills: 3 | Status: SHIPPED | OUTPATIENT
Start: 2023-09-16

## 2023-09-16 NOTE — TELEPHONE ENCOUNTER
LCV:5/1/2023  [facility] Rheumatology  NCV:5-6-24  last eye exam/plaquenil screening;8-8-23 6-23-23: Creatinine (0.51 - 0.95 mg/dL) 0.82  RF 120T:3RF

## 2023-09-29 ENCOUNTER — OFFICE VISIT (OUTPATIENT)
Dept: FAMILY MEDICINE | Facility: CLINIC | Age: 43
End: 2023-09-29
Payer: COMMERCIAL

## 2023-09-29 VITALS
OXYGEN SATURATION: 99 % | TEMPERATURE: 97.5 F | WEIGHT: 111 LBS | DIASTOLIC BLOOD PRESSURE: 61 MMHG | HEART RATE: 88 BPM | SYSTOLIC BLOOD PRESSURE: 87 MMHG | HEIGHT: 64 IN | BODY MASS INDEX: 18.95 KG/M2 | RESPIRATION RATE: 15 BRPM

## 2023-09-29 DIAGNOSIS — Z00.00 ROUTINE GENERAL MEDICAL EXAMINATION AT A HEALTH CARE FACILITY: Primary | ICD-10-CM

## 2023-09-29 PROBLEM — O09.529 SUPERVISION OF HIGH-RISK PREGNANCY OF ELDERLY MULTIGRAVIDA: Status: RESOLVED | Noted: 2018-10-17 | Resolved: 2023-09-29

## 2023-09-29 PROBLEM — R10.9 ABDOMINAL PAIN: Status: RESOLVED | Noted: 2018-12-14 | Resolved: 2023-09-29

## 2023-09-29 PROBLEM — O34.219 HISTORY OF CESAREAN DELIVERY, ANTEPARTUM: Status: RESOLVED | Noted: 2018-10-30 | Resolved: 2023-09-29

## 2023-09-29 LAB
ANION GAP SERPL CALCULATED.3IONS-SCNC: 11 MMOL/L (ref 7–15)
BUN SERPL-MCNC: 15.1 MG/DL (ref 6–20)
CALCIUM SERPL-MCNC: 9.1 MG/DL (ref 8.6–10)
CHLORIDE SERPL-SCNC: 103 MMOL/L (ref 98–107)
CHOLEST SERPL-MCNC: 136 MG/DL
CREAT SERPL-MCNC: 0.83 MG/DL (ref 0.51–0.95)
DEPRECATED HCO3 PLAS-SCNC: 25 MMOL/L (ref 22–29)
EGFRCR SERPLBLD CKD-EPI 2021: 90 ML/MIN/1.73M2
GLUCOSE SERPL-MCNC: 83 MG/DL (ref 70–99)
HBA1C MFR BLD: 4.9 % (ref 0–5.6)
HDLC SERPL-MCNC: 55 MG/DL
LDLC SERPL CALC-MCNC: 66 MG/DL
NONHDLC SERPL-MCNC: 81 MG/DL
POTASSIUM SERPL-SCNC: 4.7 MMOL/L (ref 3.4–5.3)
SODIUM SERPL-SCNC: 139 MMOL/L (ref 135–145)
TRIGL SERPL-MCNC: 75 MG/DL
TSH SERPL DL<=0.005 MIU/L-ACNC: 1.27 UIU/ML (ref 0.3–4.2)

## 2023-09-29 PROCEDURE — 86706 HEP B SURFACE ANTIBODY: CPT | Performed by: FAMILY MEDICINE

## 2023-09-29 PROCEDURE — 80048 BASIC METABOLIC PNL TOTAL CA: CPT | Performed by: FAMILY MEDICINE

## 2023-09-29 PROCEDURE — 84443 ASSAY THYROID STIM HORMONE: CPT | Performed by: FAMILY MEDICINE

## 2023-09-29 PROCEDURE — 90686 IIV4 VACC NO PRSV 0.5 ML IM: CPT | Performed by: FAMILY MEDICINE

## 2023-09-29 PROCEDURE — 83036 HEMOGLOBIN GLYCOSYLATED A1C: CPT | Performed by: FAMILY MEDICINE

## 2023-09-29 PROCEDURE — 90471 IMMUNIZATION ADMIN: CPT | Performed by: FAMILY MEDICINE

## 2023-09-29 PROCEDURE — 99396 PREV VISIT EST AGE 40-64: CPT | Mod: 25 | Performed by: FAMILY MEDICINE

## 2023-09-29 PROCEDURE — 80061 LIPID PANEL: CPT | Performed by: FAMILY MEDICINE

## 2023-09-29 PROCEDURE — 36415 COLL VENOUS BLD VENIPUNCTURE: CPT | Performed by: FAMILY MEDICINE

## 2023-09-29 ASSESSMENT — ENCOUNTER SYMPTOMS
FREQUENCY: 0
DIZZINESS: 0
ARTHRALGIAS: 1
HEADACHES: 0
CHILLS: 0
PARESTHESIAS: 0
PALPITATIONS: 0
JOINT SWELLING: 0
CONSTIPATION: 0
NAUSEA: 0
WEAKNESS: 0
SHORTNESS OF BREATH: 0
ABDOMINAL PAIN: 0
HEMATURIA: 0
COUGH: 0
EYE PAIN: 0
FEVER: 0
SORE THROAT: 0
HEARTBURN: 0
BREAST MASS: 0
NERVOUS/ANXIOUS: 0
DIARRHEA: 0
MYALGIAS: 0
DYSURIA: 0
HEMATOCHEZIA: 0

## 2023-09-29 ASSESSMENT — PAIN SCALES - GENERAL: PAINLEVEL: NO PAIN (0)

## 2023-09-29 NOTE — PROGRESS NOTES
SUBJECTIVE:   CC: Chelsea is an 43 year old who presents for preventive health visit.       2023    11:11 AM   Additional Questions   Roomed by Malik GORDON       Healthy Habits:     Getting at least 3 servings of Calcium per day:  Yes    Bi-annual eye exam:  Yes    Dental care twice a year:  Yes    Sleep apnea or symptoms of sleep apnea:  None    Diet:  Regular (no restrictions)    Frequency of exercise:  6-7 days/week    Duration of exercise:  45-60 minutes    Taking medications regularly:  Yes    Medication side effects:  None    Additional concerns today:  Yes    BP Readings from Last 6 Encounters:   23 (!) 87/61   22 97/66   22 93/61   22 102/61   21 96/69   21 94/62      Social History     Tobacco Use    Smoking status: Never    Smokeless tobacco: Never   Substance Use Topics    Alcohol use: Yes     Comment: 1-2 glasses of wine a week.              2023    11:10 AM   Alcohol Use   Prescreen: >3 drinks/day or >7 drinks/week? No     Reviewed orders with patient.  Reviewed health maintenance and updated orders accordingly - Yes      Breast Cancer Screenin/1/2022     9:06 AM   Breast CA Risk Assessment (FHS-7)   Do you have a family history of breast, colon, or ovarian cancer? No / Unknown     History of abnormal Pap smear: NO - age 30-65 PAP every 5 years with negative HPV co-testing recommended      Latest Ref Rng & Units 2021     2:19 PM 2021     1:45 PM   PAP / HPV   PAP (Historical)  NIL     HPV 16 DNA NEG^Negative  Negative    HPV 18 DNA NEG^Negative  Negative    Other HR HPV NEG^Negative  Negative      Reviewed and updated as needed this visit by clinical staff   Tobacco  Allergies  Meds  Problems  Med Hx  Surg Hx  Fam Hx          Reviewed and updated as needed this visit by Provider   Tobacco  Allergies  Meds  Problems  Med Hx  Surg Hx  Fam Hx             Review of Systems   Constitutional:  Negative for chills and fever.   HENT:   "Negative for congestion, ear pain, hearing loss and sore throat.    Eyes:  Negative for pain and visual disturbance.   Respiratory:  Negative for cough and shortness of breath.    Cardiovascular:  Negative for chest pain, palpitations and peripheral edema.   Gastrointestinal:  Negative for abdominal pain, constipation, diarrhea, heartburn, hematochezia and nausea.   Breasts:  Negative for tenderness, breast mass and discharge.   Genitourinary:  Negative for dysuria, frequency, genital sores, hematuria, pelvic pain, urgency, vaginal bleeding and vaginal discharge.   Musculoskeletal:  Positive for arthralgias. Negative for joint swelling and myalgias.   Skin:  Negative for rash.   Neurological:  Negative for dizziness, weakness, headaches and paresthesias.   Psychiatric/Behavioral:  Negative for mood changes. The patient is not nervous/anxious.       OBJECTIVE:   BP (!) 87/61   Pulse 88   Temp 97.5  F (36.4  C) (Temporal)   Resp 15   Ht 1.615 m (5' 3.6\")   Wt 50.3 kg (111 lb)   LMP 09/27/2023 (Exact Date)   SpO2 99%   BMI 19.29 kg/m    Physical Exam  GENERAL: healthy, alert and no distress  EYES: Eyes grossly normal to inspection, PERRL and conjunctivae and sclerae normal  HENT: ear canals and TM's normal, nose and mouth without ulcers or lesions  NECK: no adenopathy, no asymmetry, masses, or scars and thyroid normal to palpation  RESP: lungs clear to auscultation - no rales, rhonchi or wheezes  BREAST: normal without masses, tenderness or nipple discharge and no palpable axillary masses or adenopathy  CV: regular rate and rhythm, normal S1 S2, no S3 or S4, no murmur, click or rub, no peripheral edema and peripheral pulses strong  ABDOMEN: soft, nontender, no hepatosplenomegaly, no masses and bowel sounds normal  MS: no gross musculoskeletal defects noted, no edema  SKIN: no suspicious lesions or rashes  NEURO: Normal strength and tone, mentation intact and speech normal  PSYCH: mentation appears normal, " affect normal/bright    Diagnostic Test Results:  Labs reviewed in Epic    ASSESSMENT/PLAN:   Chelsea was seen today for physical.    Diagnoses and all orders for this visit:    Routine general medical examination at a health care facility  -     Lipid panel reflex to direct LDL Fasting; Future  -     Hemoglobin A1c; Future  -     TSH with free T4 reflex; Future  -     Basic metabolic panel  (Ca, Cl, CO2, Creat, Gluc, K, Na, BUN); Future  -     Hepatitis B Surface Antibody; Future    Other orders  -     REVIEW OF HEALTH MAINTENANCE PROTOCOL ORDERS  -     INFLUENZA VACCINE IM > 6 MONTHS VALENT IIV4 (AFLURIA/FLUZONE)        Return in about 1 year (around 9/29/2024) for Annual Physical Exam.     Patient has been advised of split billing requirements and indicates understanding: Yes      COUNSELING:  Reviewed preventive health counseling, as reflected in patient instructions       Regular exercise       Healthy diet/nutrition        She reports that she has never smoked. She has never used smokeless tobacco.    Leonie Gonzalez MD  [facility]

## 2023-09-30 LAB
HBV SURFACE AB SERPL IA-ACNC: 245.29 M[IU]/ML
HBV SURFACE AB SERPL IA-ACNC: REACTIVE M[IU]/ML

## 2023-11-06 ENCOUNTER — E-VISIT (OUTPATIENT)
Dept: FAMILY MEDICINE | Facility: CLINIC | Age: 43
End: 2023-11-06
Payer: COMMERCIAL

## 2023-11-06 DIAGNOSIS — J40 BRONCHITIS: Primary | ICD-10-CM

## 2023-11-06 PROCEDURE — 99422 OL DIG E/M SVC 11-20 MIN: CPT | Performed by: FAMILY MEDICINE

## 2023-12-08 ENCOUNTER — TELEPHONE (OUTPATIENT)
Dept: RHEUMATOLOGY | Facility: CLINIC | Age: 43
End: 2023-12-08

## 2023-12-08 NOTE — TELEPHONE ENCOUNTER
Called pt and LVM. Unfortunately Dr. Cartagena is leaving [facility] and pt will need to reschedule their appt. If pt calls back, please help them schedule a return pt appt with first available in May.

## 2024-10-01 DIAGNOSIS — M32.9 SYSTEMIC LUPUS COMPLICATING PREGNANCY (H): ICD-10-CM

## 2024-10-01 DIAGNOSIS — O99.891 SYSTEMIC LUPUS COMPLICATING PREGNANCY (H): ICD-10-CM

## 2024-10-08 NOTE — TELEPHONE ENCOUNTER
hydroxychloroquine (PLAQUENIL) 200 MG tablet       Last Written Prescription Date:  9/16/23  Last Fill Quantity: 120,   # refills: 3  Last Office Visit : 5/1/23 [place]  Future Office visit:  None  Last EYE EXAM  8/8/23  Creatinine   Date Value Ref Range Status   09/29/2023 0.83 0.51 - 0.95 mg/dL Final   10/19/2020 0.70 0.52 - 1.04 mg/dL Final      Routing refill request to provider for review/approval because:  Lab overdue, Office visit overdue, None pending, Eye visit overdue    Cat BROWN RN  P Central Nursing/[facility] Triage & Med Refill Team

## 2024-10-09 ENCOUNTER — TELEPHONE (OUTPATIENT)
Dept: RHEUMATOLOGY | Facility: CLINIC | Age: 44
End: 2024-10-09
Payer: COMMERCIAL

## 2024-10-09 RX ORDER — HYDROXYCHLOROQUINE SULFATE 200 MG/1
TABLET, FILM COATED ORAL
Qty: 180 TABLET | Refills: 1 | Status: SHIPPED | OUTPATIENT
Start: 2024-10-09

## 2024-10-09 NOTE — TELEPHONE ENCOUNTER
Patient returned call. Patient reports she will make follow up appointment with ophthalmologist today and that she is seeing PCP on Friday and will get updated labs. Patient scheduled appointment with Dr. Eldridge on 11/19. Patient to let writer know when opth appointment scheduled for plaquenil refill.     Juliet Estevez RN

## 2024-10-09 NOTE — TELEPHONE ENCOUNTER
Hydroxychloroquine (Plaquenil)       Last Written Prescription Date:  9/16/23  Last Fill Quantity: 120,   # refills: 3  Last Office Visit: 5/1/23 Dr. Cartagena  Future Office visit:  11/19/24    Patient to get labs drawn by PCP on 10/11  Last Eye exam: Eye exam scheduled 11/22     According to [facility] Rheumatology refill protocol:        Juliet Estevez RN

## 2024-10-11 ENCOUNTER — OFFICE VISIT (OUTPATIENT)
Dept: FAMILY MEDICINE | Facility: CLINIC | Age: 44
End: 2024-10-11
Payer: COMMERCIAL

## 2024-10-11 ENCOUNTER — TELEPHONE (OUTPATIENT)
Dept: FAMILY MEDICINE | Facility: CLINIC | Age: 44
End: 2024-10-11

## 2024-10-11 VITALS
OXYGEN SATURATION: 99 % | HEIGHT: 64 IN | DIASTOLIC BLOOD PRESSURE: 60 MMHG | WEIGHT: 113.3 LBS | BODY MASS INDEX: 19.34 KG/M2 | HEART RATE: 85 BPM | RESPIRATION RATE: 16 BRPM | SYSTOLIC BLOOD PRESSURE: 91 MMHG | TEMPERATURE: 98.1 F

## 2024-10-11 DIAGNOSIS — M32.9 SYSTEMIC LUPUS ERYTHEMATOSUS, UNSPECIFIED SLE TYPE, UNSPECIFIED ORGAN INVOLVEMENT STATUS (H): ICD-10-CM

## 2024-10-11 DIAGNOSIS — D72.819 LEUKOPENIA, UNSPECIFIED TYPE: ICD-10-CM

## 2024-10-11 DIAGNOSIS — Z00.00 ENCOUNTER FOR PREVENTATIVE ADULT HEALTH CARE EXAMINATION: Primary | ICD-10-CM

## 2024-10-11 DIAGNOSIS — E03.8 SUBCLINICAL HYPOTHYROIDISM: ICD-10-CM

## 2024-10-11 DIAGNOSIS — Z23 ENCOUNTER FOR IMMUNIZATION: ICD-10-CM

## 2024-10-11 LAB
ALBUMIN SERPL BCG-MCNC: 4.3 G/DL (ref 3.5–5.2)
ALP SERPL-CCNC: 49 U/L (ref 40–150)
ALT SERPL W P-5'-P-CCNC: 18 U/L (ref 0–50)
ANION GAP SERPL CALCULATED.3IONS-SCNC: 9 MMOL/L (ref 7–15)
AST SERPL W P-5'-P-CCNC: 29 U/L (ref 0–45)
BILIRUB SERPL-MCNC: 0.5 MG/DL
BUN SERPL-MCNC: 17.7 MG/DL (ref 6–20)
CALCIUM SERPL-MCNC: 8.9 MG/DL (ref 8.8–10.4)
CHLORIDE SERPL-SCNC: 103 MMOL/L (ref 98–107)
CHOLEST SERPL-MCNC: 145 MG/DL
CREAT SERPL-MCNC: 0.75 MG/DL (ref 0.51–0.95)
EGFRCR SERPLBLD CKD-EPI 2021: >90 ML/MIN/1.73M2
ERYTHROCYTE [DISTWIDTH] IN BLOOD BY AUTOMATED COUNT: 11.4 % (ref 10–15)
EST. AVERAGE GLUCOSE BLD GHB EST-MCNC: 91 MG/DL
FASTING STATUS PATIENT QL REPORTED: YES
FASTING STATUS PATIENT QL REPORTED: YES
GLUCOSE SERPL-MCNC: 72 MG/DL (ref 70–99)
HBA1C MFR BLD: 4.8 % (ref 0–5.6)
HCO3 SERPL-SCNC: 27 MMOL/L (ref 22–29)
HCT VFR BLD AUTO: 41 % (ref 35–47)
HDLC SERPL-MCNC: 54 MG/DL
HGB BLD-MCNC: 13.9 G/DL (ref 11.7–15.7)
LDLC SERPL CALC-MCNC: 74 MG/DL
MCH RBC QN AUTO: 31.4 PG (ref 26.5–33)
MCHC RBC AUTO-ENTMCNC: 33.9 G/DL (ref 31.5–36.5)
MCV RBC AUTO: 93 FL (ref 78–100)
NONHDLC SERPL-MCNC: 91 MG/DL
PLATELET # BLD AUTO: 137 10E3/UL (ref 150–450)
POTASSIUM SERPL-SCNC: 3.9 MMOL/L (ref 3.4–5.3)
PROT SERPL-MCNC: 7.7 G/DL (ref 6.4–8.3)
RBC # BLD AUTO: 4.43 10E6/UL (ref 3.8–5.2)
SODIUM SERPL-SCNC: 139 MMOL/L (ref 135–145)
TRIGL SERPL-MCNC: 85 MG/DL
TSH SERPL DL<=0.005 MIU/L-ACNC: 1.48 UIU/ML (ref 0.3–4.2)
WBC # BLD AUTO: 2.2 10E3/UL (ref 4–11)

## 2024-10-11 PROCEDURE — 80053 COMPREHEN METABOLIC PANEL: CPT | Performed by: FAMILY MEDICINE

## 2024-10-11 PROCEDURE — 84443 ASSAY THYROID STIM HORMONE: CPT | Performed by: FAMILY MEDICINE

## 2024-10-11 PROCEDURE — 99396 PREV VISIT EST AGE 40-64: CPT | Mod: 25 | Performed by: FAMILY MEDICINE

## 2024-10-11 PROCEDURE — 90471 IMMUNIZATION ADMIN: CPT | Performed by: FAMILY MEDICINE

## 2024-10-11 PROCEDURE — 85027 COMPLETE CBC AUTOMATED: CPT | Performed by: FAMILY MEDICINE

## 2024-10-11 PROCEDURE — 90656 IIV3 VACC NO PRSV 0.5 ML IM: CPT | Performed by: FAMILY MEDICINE

## 2024-10-11 PROCEDURE — 83036 HEMOGLOBIN GLYCOSYLATED A1C: CPT | Performed by: FAMILY MEDICINE

## 2024-10-11 PROCEDURE — 80061 LIPID PANEL: CPT | Performed by: FAMILY MEDICINE

## 2024-10-11 PROCEDURE — 36415 COLL VENOUS BLD VENIPUNCTURE: CPT | Performed by: FAMILY MEDICINE

## 2024-10-11 SDOH — HEALTH STABILITY: PHYSICAL HEALTH: ON AVERAGE, HOW MANY DAYS PER WEEK DO YOU ENGAGE IN MODERATE TO STRENUOUS EXERCISE (LIKE A BRISK WALK)?: 5 DAYS

## 2024-10-11 ASSESSMENT — PAIN SCALES - GENERAL: PAINLEVEL: NO PAIN (0)

## 2024-10-11 ASSESSMENT — SOCIAL DETERMINANTS OF HEALTH (SDOH): HOW OFTEN DO YOU GET TOGETHER WITH FRIENDS OR RELATIVES?: MORE THAN THREE TIMES A WEEK

## 2024-10-11 NOTE — RESULT ENCOUNTER NOTE
Dear Chelsea,   Here are your recent results. Your white blood cell and platelet counts are low. This is likely secondary to SLE and plaquenil.     Best Regards  Leonie Gonzalez MD

## 2024-10-11 NOTE — TELEPHONE ENCOUNTER
Noted. Patient has a long history of leukopenia. She is currently on plaquenil for SLE.     MD Carlos

## 2024-10-11 NOTE — PATIENT INSTRUCTIONS
Patient Education   Preventive Care Advice   This is general advice given by our system to help you stay healthy. However, your care team may have specific advice just for you. Please talk to your care team about your preventive care needs.  Nutrition  Eat 5 or more servings of fruits and vegetables each day.  Try wheat bread, brown rice and whole grain pasta (instead of white bread, rice, and pasta).  Get enough calcium and vitamin D. Check the label on foods and aim for 100% of the RDA (recommended daily allowance).  Lifestyle  Exercise at least 150 minutes each week  (30 minutes a day, 5 days a week).  Do muscle strengthening activities 2 days a week. These help control your weight and prevent disease.  No smoking.  Wear sunscreen to prevent skin cancer.  Have a dental exam and cleaning every 6 months.  Yearly exams  See your health care team every year to talk about:  Any changes in your health.  Any medicines your care team has prescribed.  Preventive care, family planning, and ways to prevent chronic diseases.  Shots (vaccines)   HPV shots (up to age 26), if you've never had them before.  Hepatitis B shots (up to age 59), if you've never had them before.  COVID-19 shot: Get this shot when it's due.  Flu shot: Get a flu shot every year.  Tetanus shot: Get a tetanus shot every 10 years.  Pneumococcal, hepatitis A, and RSV shots: Ask your care team if you need these based on your risk.  Shingles shot (for age 50 and up)  General health tests  Diabetes screening:  Starting at age 35, Get screened for diabetes at least every 3 years.  If you are younger than age 35, ask your care team if you should be screened for diabetes.  Cholesterol test: At age 39, start having a cholesterol test every 5 years, or more often if advised.  Bone density scan (DEXA): At age 50, ask your care team if you should have this scan for osteoporosis (brittle bones).  Hepatitis C: Get tested at least once in your life.  STIs (sexually  transmitted infections)  Before age 24: Ask your care team if you should be screened for STIs.  After age 24: Get screened for STIs if you're at risk. You are at risk for STIs (including HIV) if:  You are sexually active with more than one person.  You don't use condoms every time.  You or a partner was diagnosed with a sexually transmitted infection.  If you are at risk for HIV, ask about PrEP medicine to prevent HIV.  Get tested for HIV at least once in your life, whether you are at risk for HIV or not.  Cancer screening tests  Cervical cancer screening: If you have a cervix, begin getting regular cervical cancer screening tests starting at age 21.  Breast cancer scan (mammogram): If you've ever had breasts, begin having regular mammograms starting at age 40. This is a scan to check for breast cancer.  Colon cancer screening: It is important to start screening for colon cancer at age 45.  Have a colonoscopy test every 10 years (or more often if you're at risk) Or, ask your provider about stool tests like a FIT test every year or Cologuard test every 3 years.  To learn more about your testing options, visit:   .  For help making a decision, visit:   https://bit.ly/lj44353.  Prostate cancer screening test: If you have a prostate, ask your care team if a prostate cancer screening test (PSA) at age 55 is right for you.  Lung cancer screening: If you are a current or former smoker ages 50 to 80, ask your care team if ongoing lung cancer screenings are right for you.  For informational purposes only. Not to replace the advice of your health care provider. Copyright   2023 [email or web address]. All rights reserved. Clinically reviewed by the [facility]. [employer] [id number] - REV 01/24.

## 2024-10-11 NOTE — TELEPHONE ENCOUNTER
DATE/TIME OF CALL RECEIVED FROM LAB:  10/11/24 at 9:39 AM   LAB TEST:  WBC  LAB VALUE:  1.8  PROVIDER NOTIFIED?: Yes  PROVIDER NAME: Dr Leonie Gonzalez  DATE/TIME LAB VALUE REPORTED TO PROVIDER: 10/11/24 at 9:39am  MECHANISM OF PROVIDER NOTIFICATION: Face-To-Face  PROVIDER RESPONSE: Provider aware, no action at this time    Nano Samuels RN

## 2024-10-11 NOTE — PROGRESS NOTES
Preventive Care Visit  [facility]  Leonie Gonzalez MD, Family Medicine  Oct 11, 2024      Assessment & Plan     Chelsea was seen today for physical.    Diagnoses and all orders for this visit:    Encounter for preventative adult health care examination  -     Hemoglobin A1c; Future  -     Lipid panel reflex to direct LDL Fasting; Future  -     TSH with free T4 reflex; Future  -     Comprehensive metabolic panel; Future  -     CBC with platelets; Future    Subclinical hypothyroidism  -     TSH with free T4 reflex; Future    Leukopenia, unspecified type  Patient has a diagnosis of systemic lupus erythematosus.  She is currently taking Plaquenil.  Leukopenia is likely secondary to immunosuppressive medication.  -     CBC with platelets; Future    Systemic lupus erythematosus, unspecified SLE type, unspecified organ involvement status (H)  -     Comprehensive metabolic panel; Future  -     CBC with platelets; Future    Encounter for immunization  -     INFLUENZA VACCINE,SPLIT VIRUS,TRIVALENT,PF(FLUZONE)    Other orders  -     REVIEW OF HEALTH MAINTENANCE PROTOCOL ORDERS  -     PRIMARY CARE FOLLOW-UP SCHEDULING; Future        Health Maintenance:     Follow-up Visit   Expected date:  Oct 11, 2025 (Approximate)      Follow Up Appointment Details:     Follow-up with whom?: PCP    Follow-Up for what?: Adult Preventive    How?: In Person                       Counseling  Appropriate preventive services were addressed with this patient via screening, questionnaire, or discussion as appropriate for fall prevention, nutrition, physical activity, Tobacco-use cessation, social engagement, weight loss and cognition.  Checklist reviewing preventive services available has been given to the patient.  Reviewed patient's diet, addressing concerns and/or questions.   She is at risk for psychosocial distress and has been provided with information to reduce risk.       Subjective   Chelsea is a 44 year old, presenting  for the following:  Physical        10/11/2024     8:37 AM   Additional Questions   Roomed by Malik GORDON        Health Care Directive  Patient does not have a Health Care Directive or Living Will: Discussed advance care planning with patient; information given to patient to review.    HPI        10/11/2024   General Health   How would you rate your overall physical health? Excellent   Feel stress (tense, anxious, or unable to sleep) Only a little      (!) STRESS CONCERN      10/11/2024   Nutrition   Three or more servings of calcium each day? Yes   Diet: Regular (no restrictions)   How many servings of fruit and vegetables per day? 4 or more   How many sweetened beverages each day? 0-1            10/11/2024   Exercise   Days per week of moderate/strenous exercise 5 days            10/11/2024   Social Factors   Frequency of gathering with friends or relatives More than three times a week   Worry food won't last until get money to buy more No   Food not last or not have enough money for food? No   Do you have housing? (Housing is defined as stable permanent housing and does not include staying ouside in a car, in a tent, in an abandoned building, in an overnight shelter, or couch-surfing.) Yes   Are you worried about losing your housing? No   Lack of transportation? No   Unable to get utilities (heat,electricity)? No            10/11/2024   Dental   Dentist two times every year? Yes            10/11/2024   TB Screening   Were you born outside of the [place]? No            Today's PHQ-2 Score:       10/11/2024     8:36 AM   PHQ-2 ( 1999 [employer])   Q1: Little interest or pleasure in doing things 0   Q2: Feeling down, depressed or hopeless 0   PHQ-2 Score 0   Q1: Little interest or pleasure in doing things Not at all   Q2: Feeling down, depressed or hopeless Not at all   PHQ-2 Score 0           10/11/2024   Substance Use   Alcohol more than 3/day or more than 7/wk No   Do you use any other substances recreationally? No     "    Social History     Tobacco Use    Smoking status: Never    Smokeless tobacco: Never   Substance Use Topics    Alcohol use: Yes     Comment: 1-2 glasses of wine a week.     Drug use: No           2/8/2023   LAST FHS-7 RESULTS   1st degree relative breast or ovarian cancer No   Any relative bilateral breast cancer No   Any male have breast cancer No   Any ONE woman have BOTH breast AND ovarian cancer No   Any woman with breast cancer before 50yrs No   2 or more relatives with breast AND/OR ovarian cancer No   2 or more relatives with breast AND/OR bowel cancer No             10/11/2024   STI Screening   New sexual partner(s) since last STI/HIV test? No        History of abnormal Pap smear: No - age 30- 64 PAP with HPV every 5 years recommended        Latest Ref Rng & Units 2/2/2021     2:19 PM 2/2/2021     1:45 PM   PAP / HPV   PAP (Historical)  NIL     HPV 16 DNA NEG^Negative  Negative    HPV 18 DNA NEG^Negative  Negative    Other HR HPV NEG^Negative  Negative      ASCVD Risk   The 10-year ASCVD risk score (Ede NAZARIO, et al., 2019) is: 0.2%    Values used to calculate the score:      Age: 44 years      Sex: Female      Is Non- : No      Diabetic: No      Tobacco smoker: No      Systolic Blood Pressure: 91 mmHg      Is BP treated: No      HDL Cholesterol: 55 mg/dL      Total Cholesterol: 136 mg/dL        10/11/2024   Contraception/Family Planning   Questions about contraception or family planning No           Reviewed and updated as needed this visit by Provider   Tobacco  Allergies  Meds  Problems  Med Hx  Surg Hx  Fam Hx              Review of Systems  Constitutional, neuro, ENT, endocrine, pulmonary, cardiac, gastrointestinal, genitourinary, musculoskeletal, integument and psychiatric systems are negative, except as otherwise noted.     Objective    Exam  BP 91/60   Pulse 85   Temp 98.1  F (36.7  C) (Temporal)   Resp 16   Ht 1.615 m (5' 3.6\")   Wt 51.4 kg (113 lb 4.8 " "oz)   LMP 09/30/2024 (Exact Date)   SpO2 99%   BMI 19.69 kg/m     Estimated body mass index is 19.69 kg/m  as calculated from the following:    Height as of this encounter: 1.615 m (5' 3.6\").    Weight as of this encounter: 51.4 kg (113 lb 4.8 oz).    Physical Exam  GENERAL: alert and no distress  EYES: Eyes grossly normal to inspection, PERRL and conjunctivae and sclerae normal  HENT: ear canals and TM's normal, nose and mouth without ulcers or lesions  NECK: no adenopathy, no asymmetry, masses, or scars  RESP: lungs clear to auscultation - no rales, rhonchi or wheezes  CV: regular rate and rhythm, normal S1 S2, no S3 or S4, no murmur, click or rub, no peripheral edema  ABDOMEN: soft, nontender, no hepatosplenomegaly, no masses and bowel sounds normal  MS: no gross musculoskeletal defects noted, no edema  SKIN: no suspicious lesions or rashes  NEURO: Normal strength and tone, mentation intact and speech normal  PSYCH: mentation appears normal, affect normal/bright        Signed Electronically by: Leonie Gonzalez MD    "

## 2024-10-11 NOTE — NURSING NOTE
Prior to immunization administration, verified patients identity using patient s name and date of birth. Please see Immunization Activity for additional information.     Screening Questionnaire for Adult Immunization    Are you sick today?   No   Do you have allergies to medications, food, a vaccine component or latex?   No   Have you ever had a serious reaction after receiving a vaccination?   No   Do you have a long-term health problem with heart, lung, kidney, or metabolic disease (e.g., diabetes), asthma, a blood disorder, no spleen, complement component deficiency, a cochlear implant, or a spinal fluid leak?  Are you on long-term aspirin therapy?   No   Do you have cancer, leukemia, HIV/AIDS, or any other immune system problem?   No   Do you have a parent, brother, or sister with an immune system problem?   No   In the past 3 months, have you taken medications that affect  your immune system, such as prednisone, other steroids, or anticancer drugs; drugs for the treatment of rheumatoid arthritis, Crohn s disease, or psoriasis; or have you had radiation treatments?   No   Have you had a seizure, or a brain or other nervous system problem?   No   During the past year, have you received a transfusion of blood or blood    products, or been given immune (gamma) globulin or antiviral drug?   No   For women: Are you pregnant or is there a chance you could become       pregnant during the next month?   No   Have you received any vaccinations in the past 4 weeks?   No     Immunization questionnaire answers were all negative.      Patient instructed to remain in clinic for 15 minutes afterwards, and to report any adverse reactions.     Screening performed by Krista Roe on 10/11/2024 at 9:21 AM.

## 2024-10-22 ENCOUNTER — HOSPITAL ENCOUNTER (OUTPATIENT)
Dept: MAMMOGRAPHY | Facility: CLINIC | Age: 44
Discharge: HOME | End: 2024-10-22
Attending: FAMILY MEDICINE | Admitting: FAMILY MEDICINE
Payer: COMMERCIAL

## 2024-10-22 DIAGNOSIS — Z12.31 VISIT FOR SCREENING MAMMOGRAM: ICD-10-CM

## 2024-10-22 PROCEDURE — 77063 BREAST TOMOSYNTHESIS BI: CPT

## 2024-11-07 DIAGNOSIS — H43.393 VITREOUS SYNERESIS OF BOTH EYES: Primary | ICD-10-CM

## 2024-11-18 ENCOUNTER — TELEPHONE (OUTPATIENT)
Dept: RHEUMATOLOGY | Facility: CLINIC | Age: 44
End: 2024-11-18
Payer: COMMERCIAL

## 2024-11-18 ENCOUNTER — E-VISIT (OUTPATIENT)
Dept: FAMILY MEDICINE | Facility: CLINIC | Age: 44
End: 2024-11-18
Payer: COMMERCIAL

## 2024-11-18 DIAGNOSIS — J21.9 ACUTE BRONCHIOLITIS, UNSPECIFIED: Primary | ICD-10-CM

## 2024-11-18 RX ORDER — DOXYCYCLINE HYCLATE 100 MG
100 TABLET ORAL 2 TIMES DAILY
Qty: 14 TABLET | Refills: 0 | Status: SHIPPED | OUTPATIENT
Start: 2024-11-18 | End: 2024-11-25

## 2024-11-18 NOTE — PATIENT INSTRUCTIONS
Thank you for choosing us for your care. I have placed an order for a prescription so that you can start treatment. View your full visit summary for details by clicking on the link below. Your pharmacist will able to address any questions you may have about the medication.     If you're not feeling better within 5-7 days, please schedule an appointment.  You can schedule an appointment right here in [facility], or call [phone]  If the visit is for the same symptoms as your eVisit, we'll refund the cost of your eVisit if seen within seven days.

## 2024-11-18 NOTE — TELEPHONE ENCOUNTER
[facility] Call [facility]    Phone Message    May a detailed message be left on voicemail: yes     Reason for Call: Other: Pt is not feeling well and is calling to see if she can switch visit tomorrow 11/19 to a video visit. Please call pt back at ph: [phone].     Action Taken: Message routed to:  Other: [facility]    Travel Screening: Not Applicable     Date of Service: 11/18

## 2024-11-18 NOTE — TELEPHONE ENCOUNTER
Huddled with Dr. Eldridge and she gives approval for this appt to be virtual. Changed appt and scheduled pt for video visit with Dr. Eldridge tomorrow. Called pt and let her know of the change.

## 2024-11-19 ENCOUNTER — VIRTUAL VISIT (OUTPATIENT)
Dept: RHEUMATOLOGY | Facility: CLINIC | Age: 44
End: 2024-11-19
Attending: INTERNAL MEDICINE
Payer: COMMERCIAL

## 2024-11-19 VITALS — WEIGHT: 110 LBS | BODY MASS INDEX: 19.49 KG/M2 | HEIGHT: 63 IN

## 2024-11-19 DIAGNOSIS — R76.8 SS-A ANTIBODY POSITIVE: ICD-10-CM

## 2024-11-19 DIAGNOSIS — D69.6 THROMBOCYTOPENIA (H): ICD-10-CM

## 2024-11-19 DIAGNOSIS — R76.8 ANTI-RNP ANTIBODIES PRESENT: ICD-10-CM

## 2024-11-19 DIAGNOSIS — R76.8 POSITIVE ANA (ANTINUCLEAR ANTIBODY): ICD-10-CM

## 2024-11-19 DIAGNOSIS — I73.00 RAYNAUD'S DISEASE WITHOUT GANGRENE: ICD-10-CM

## 2024-11-19 DIAGNOSIS — R76.8 SMITH ANTIBODY POSITIVE: ICD-10-CM

## 2024-11-19 DIAGNOSIS — D70.8 OTHER NEUTROPENIA (H): ICD-10-CM

## 2024-11-19 DIAGNOSIS — M32.9 SYSTEMIC LUPUS ERYTHEMATOSUS, UNSPECIFIED SLE TYPE, UNSPECIFIED ORGAN INVOLVEMENT STATUS (H): Primary | ICD-10-CM

## 2024-11-19 DIAGNOSIS — Z79.899 LONG-TERM USE OF HIGH-RISK MEDICATION: ICD-10-CM

## 2024-11-19 ASSESSMENT — PAIN SCALES - GENERAL: PAINLEVEL_OUTOF10: NO PAIN (0)

## 2024-11-19 NOTE — LETTER
2024       RE: Chelsea Stein  [address] [place] [address]     Dear Colleague,    Thank you for referring your patient, Chelsea Stein, to the [facility] RHEUMATOLOGY at [facility]. Please see a copy of my visit note below.    TELE HEALTH NOTE- RHEUMATOLOGY    Verbal consent obtained from patient for telehealth services provided below.  Communication with patient was conducted via [employer].  Location of Patient: Home   Location of Provider: [facility]  I spent over 50% of the visit devoted to coordination of care and counseling with the patient, as documented under PLANS below.  Chelsea Stein     Joined the call at 2024, 8:45:22 am.  Left the call at 2024, 8:55:37 am.  You were on the call for 10 minutes 14 seconds .      CC:Follow up for SLE.     HPI:    Patient is a 44 year old White female presenting for Telemedicine today as a follow up patient. She was a former patient of . Previously seen at [facility] (Dr. Tiffany Parkinson) and [facility] (Dr. Conde).    Today she is having cough and hot flashes. Her kids are sick as well. Continues to be on  mg daily. Has made an appt with Ophthalmology. Denied having malar rash, joint pain or swelling. Raynaud's is under control as well as she is very cautious about protection from cold.        HISTORY:    Past Medical History:   Diagnosis Date     Lupus (systemic lupus erythematosus) (H)     SSA+       Past Surgical History:   Procedure Laterality Date      SECTION  2014       Family History   Problem Relation Age of Onset     Squamous cell carcinoma Mother      Diabetes No family hx of      Glaucoma No family hx of      Macular Degeneration No family hx of        History   Smoking Status     Never   Smokeless Tobacco     Never       Social History    Substance and Sexual Activity      Alcohol use: Yes        Comment: 1-2 glasses of  wine a week.       History   Drug Use No       Social History     Social History Narrative     Not on file       Current Outpatient Medications   Medication Sig Dispense Refill     doxycycline hyclate (VIBRA-TABS) 100 MG tablet Take 1 tablet (100 mg) by mouth 2 times daily for 7 days. 14 tablet 0     hydroxychloroquine (PLAQUENIL) 200 MG tablet Take 2 tablets daily Monday, Wednesday, Friday and 1 tablet daily all other days. Annual Plaquenil eye exam- due 8-8-24. 180 tablet 1       REVIEW OF SYSTEMS:    Constitutional: pos for hot flashes   Musculoskeletal: negative for joint pain or swelling  Skin: negative for malar rash     TELE HEALTH EXAM:    The patient sounded Alert and oriented  General: Alert, No apparent distress   Psych: Affect euthymic  Skin: No rashes noted  Neck: No visible swelling  Respiratory: Breathing appears normal    Labs:    Oct 2024  CBC - low wbc at 2.2, platelet low at 137  CMP - LFT normal, creatinine normal  TSH normal     June 2023  UA  - Protein 30 mg/dl  Total protein/cr ratio normal    2018  OSH labs - ds DNA neg with normal C3,C4.  Neg anti cardiolipin and beta 2 GP        ASSESSMENT:    1. Systemic lupus erythematosus, unspecified SLE type, unspecified organ involvement status (H)    2. Positive MAURICIO (antinuclear antibody)    3. Hudson antibody positive    4. Anti-RNP antibodies present    5. SS-A antibody positive    6. Raynaud's disease without gangrene    7. Long-term use of high-risk medication    8. Other neutropenia (H)    9. Thrombocytopenia (H)         (M32.9) Systemic lupus erythematosus, unspecified SLE type, unspecified organ involvement status (H)  (primary encounter diagnosis)  Comment: Former patient of . Previously seen at [facility] (Dr. Tiffany Parkinson) and [facility] (Dr. Conde). She is on  mg daily. No malar rash, joint pain or swelling or hair loss. Raynaud's is well controlled. However, she had leukopenia and thrombocytopenia on last  set of labs.  Plan:   Continue  MG daily.   Recheck labs as below.   She is due for annual retinal exam.   Orders Placed This Encounter   Procedures     Complement C3     Complement C4     DNA double stranded antibodies     Protein timed urine     Quantiferon TB Gold Plus     (R76.8) Positive MAURICIO (antinuclear antibody)  (R76.8) Hudson antibody positive  (R76.8) Anti-RNP antibodies present  (R76.8) SS-A antibody positive  Comment: 2/2 SLE   Plan: as above     (R77.8) Low serum complement C4  Comment: noted  Plan: monitor     (I73.00) Raynaud's disease without gangrene  Comment: noted  Plan: avoid cold   Wear protective gloves.     (Z79.899) Long-term use of high-risk medication  Comment: HCQ    Last eye exam was in 8/2023     Plan: have explained potential side effects of Plaquenil including but not limited to retinal toxicity, need for annual retinal eye exams by an ophthalmologist, skin pigmentation, possible QTc prolongation and cause for cardiac arrhythmias especially with medication interactions, possible GI upset, possible muscle weakness.     Leukopenia  Thrombocytopenia  Comment : noted, BM bx in the past neg for malignancy   Plan : recheck labs     Rtc in 6 months.       After visit summary (AVS ) documentation will be available through [employer] for this encounter.     My diagnostic impression and treatment plans were discussed at length with the patient.  All side effects as well as drug-drug interactions and risks discussed at length.    Shahrzad Eldridge MD      Again, thank you for allowing me to participate in the care of your patient.      Sincerely,    Shahrzad Eldridge MD

## 2024-11-19 NOTE — PROGRESS NOTES
"Virtual Visit Details    Type of service:  Video Visit     Originating Location (pt. Location): {video visit patient location:495512::\"Home\"}  {PROVIDER LOCATION On-site should be selected for visits conducted from your clinic location or adjoining [facility] hospital, academic office, or other nearby [facility] building. Off-site should be selected for all other provider locations, including home:[id number]}  Distant Location (provider location):  {virtual location provider:491925}  Platform used for Video Visit: {Virtual Visit Platforms:797599::\"Cap That\"}    "

## 2024-11-19 NOTE — NURSING NOTE
Current patient location: [address]  [place] [address]    Is the patient currently in the state of [place]? YES    Visit mode:VIDEO    If the visit is dropped, the patient can be reconnected by:VIDEO VISIT: Text to cell phone:   Telephone Information:   Mobile [phone]    and VIDEO VISIT: Send to e-mail at: [email or web address]    Will anyone else be joining the visit? NO  (If patient encounters technical issues they should call [phone])    Are changes needed to the allergy or medication list? No    Are refills needed on medications prescribed by this physician? NO    Rooming Documentation:  Not applicable    Reason for visit: Consult    Sophia TALAVERA

## 2024-11-19 NOTE — PROGRESS NOTES
TELE HEALTH NOTE- RHEUMATOLOGY    Verbal consent obtained from patient for telehealth services provided below.  Communication with patient was conducted via Yashi.  Location of Patient: Home   Location of Provider: [facility]  I spent over 50% of the visit devoted to coordination of care and counseling with the patient, as documented under PLANS below.  Chelsea Stein     Joined the call at 2024, 8:45:22 am.  Left the call at 2024, 8:55:37 am.  You were on the call for 10 minutes 14 seconds .      CC:Follow up for SLE.     HPI:    Patient is a 44 year old White female presenting for Telemedicine today as a follow up patient. She was a former patient of . Previously seen at [facility] (Dr. Tiffany Parkinson) and [facility] (Dr. Conde).    Today she is having cough and hot flashes. Her kids are sick as well. Continues to be on  mg daily. Has made an appt with Ophthalmology. Denied having malar rash, joint pain or swelling. Raynaud's is under control as well as she is very cautious about protection from cold.        HISTORY:    Past Medical History:   Diagnosis Date    Lupus (systemic lupus erythematosus) (H)     SSA+       Past Surgical History:   Procedure Laterality Date     SECTION  2014       Family History   Problem Relation Age of Onset    Squamous cell carcinoma Mother     Diabetes No family hx of     Glaucoma No family hx of     Macular Degeneration No family hx of        History   Smoking Status    Never   Smokeless Tobacco    Never       Social History    Substance and Sexual Activity      Alcohol use: Yes        Comment: 1-2 glasses of wine a week.       History   Drug Use No       Social History     Social History Narrative    Not on file       Current Outpatient Medications   Medication Sig Dispense Refill    doxycycline hyclate (VIBRA-TABS) 100 MG tablet Take 1 tablet (100 mg) by mouth 2 times daily for 7 days. 14 tablet 0     hydroxychloroquine (PLAQUENIL) 200 MG tablet Take 2 tablets daily Monday, Wednesday, Friday and 1 tablet daily all other days. Annual Plaquenil eye exam- due 8-8-24. 180 tablet 1       REVIEW OF SYSTEMS:    Constitutional: pos for hot flashes   Musculoskeletal: negative for joint pain or swelling  Skin: negative for malar rash     TELE HEALTH EXAM:    The patient sounded Alert and oriented  General: Alert, No apparent distress   Psych: Affect euthymic  Skin: No rashes noted  Neck: No visible swelling  Respiratory: Breathing appears normal    Labs:    Oct 2024  CBC - low wbc at 2.2, platelet low at 137  CMP - LFT normal, creatinine normal  TSH normal     June 2023  UA  - Protein 30 mg/dl  Total protein/cr ratio normal    2018  OSH labs - ds DNA neg with normal C3,C4.  Neg anti cardiolipin and beta 2 GP        ASSESSMENT:    1. Systemic lupus erythematosus, unspecified SLE type, unspecified organ involvement status (H)    2. Positive MAURICIO (antinuclear antibody)    3. Hudson antibody positive    4. Anti-RNP antibodies present    5. SS-A antibody positive    6. Raynaud's disease without gangrene    7. Long-term use of high-risk medication    8. Other neutropenia (H)    9. Thrombocytopenia (H)         (M32.9) Systemic lupus erythematosus, unspecified SLE type, unspecified organ involvement status (H)  (primary encounter diagnosis)  Comment: Former patient of . Previously seen at [facility] (Dr. Tiffany Parkinson) and [facility] (Dr. Conde). She is on  mg daily. No malar rash, joint pain or swelling or hair loss. Raynaud's is well controlled. However, she had leukopenia and thrombocytopenia on last set of labs.  Plan:   Continue  MG daily.   Recheck labs as below.   She is due for annual retinal exam.   Orders Placed This Encounter   Procedures    Complement C3    Complement C4    DNA double stranded antibodies    Protein timed urine    Quantiferon TB Gold Plus     (R76.8) Positive MAURICIO  (antinuclear antibody)  (R76.8) Hudson antibody positive  (R76.8) Anti-RNP antibodies present  (R76.8) SS-A antibody positive  Comment: 2/2 SLE   Plan: as above     (R77.8) Low serum complement C4  Comment: noted  Plan: monitor     (I73.00) Raynaud's disease without gangrene  Comment: noted  Plan: avoid cold   Wear protective gloves.     (Z79.899) Long-term use of high-risk medication  Comment: HCQ    Last eye exam was in 8/2023     Plan: have explained potential side effects of Plaquenil including but not limited to retinal toxicity, need for annual retinal eye exams by an ophthalmologist, skin pigmentation, possible QTc prolongation and cause for cardiac arrhythmias especially with medication interactions, possible GI upset, possible muscle weakness.     Leukopenia  Thrombocytopenia  Comment : noted, BM bx in the past neg for malignancy   Plan : recheck labs     Rtc in 6 months.       After visit summary (AVS ) documentation will be available through [facility] for this encounter.     My diagnostic impression and treatment plans were discussed at length with the patient.  All side effects as well as drug-drug interactions and risks discussed at length.    Shahrzad Eldridge MD

## 2024-11-22 DIAGNOSIS — M32.9 SYSTEMIC LUPUS COMPLICATING PREGNANCY (H): ICD-10-CM

## 2024-11-22 DIAGNOSIS — O99.891 SYSTEMIC LUPUS COMPLICATING PREGNANCY (H): ICD-10-CM

## 2024-11-22 NOTE — TELEPHONE ENCOUNTER
----- Message from Shahrzad Eldridge sent at 11/22/2024  9:51 AM CST -----  Regarding: RE: plaquenil dosage  Sure, will do. Thank you so much.     Kenia, could you please let the patient know to reduce the dose of HCQ to 200 mg daily?  ----- Message -----  From: Clare Montano MD  Sent: 11/22/2024   9:38 AM CST  To: Shahrzad Eldridge MD  Subject: plaquenil dosage                                 Hello,    She is slightly higher than 5 mg/kg/day, I'm curious if you would be willing to reduce her dose slightly to meet that threshold?  I am not worried about toxicity at present.    Clare MORENO

## 2024-11-22 NOTE — TELEPHONE ENCOUNTER
Called and LM with patient. Informed patient that writer would send MyChart message with recommendations from ophthalmologist/rheum. New plaquenil script pended and sent to provider for review and sign.    Juliet Estevez RN

## 2024-11-25 RX ORDER — HYDROXYCHLOROQUINE SULFATE 200 MG/1
200 TABLET, FILM COATED ORAL DAILY
Qty: 180 TABLET | Refills: 1 | Status: SHIPPED | OUTPATIENT
Start: 2024-11-25

## 2025-01-21 ENCOUNTER — OFFICE VISIT (OUTPATIENT)
Dept: OTOLARYNGOLOGY | Facility: CLINIC | Age: 45
End: 2025-01-21
Payer: COMMERCIAL

## 2025-01-21 VITALS — OXYGEN SATURATION: 99 % | BODY MASS INDEX: 20.55 KG/M2 | WEIGHT: 116 LBS | TEMPERATURE: 97.6 F | HEART RATE: 72 BPM

## 2025-01-21 DIAGNOSIS — H61.22 IMPACTED CERUMEN OF LEFT EAR: Primary | ICD-10-CM

## 2025-01-21 ASSESSMENT — PAIN SCALES - GENERAL: PAINLEVEL_OUTOF10: NO PAIN (0)

## 2025-01-21 NOTE — NURSING NOTE
Chief Complaint   Patient presents with    RECHECK     Ear cleaning      Pulse 72, temperature 97.6  F (36.4  C), weight 52.6 kg (116 lb), SpO2 99%, not currently breastfeeding.  Bhavik Moreno LPN

## 2025-01-21 NOTE — LETTER
1/21/2025       RE: Chelsea Stein  [address] [place] [address]     Dear Colleague,    Thank you for referring your patient, Chelsea Stein, to the [facility] [place] [facility]. Please see a copy of my visit note below.      Otolaryngology Clinic  January 21, 2025    HPI:  Chelsea Stein is here for a left ear cleaning. History of left cerumen impaction. Last seen in clinic 3/7/23 for ear cleaning.      Today, patient denies any otalgia, otorrhea or change in hearing. Left ear is intermittently plugged.     Otologic microscope exam:    Patient's ear pathology required use of the binocular microscope for the purpose of cleaning and improving visualization in order to assure a more accurate diagnostic evaluation.    Right ear was examined under the microscope.  Normal appearing TM, nicely aerated middle ear space.     Left ear was also examined under the microscope. Almost complete cerumen impaction. It was cleaned with right angle hook, suction, and alligator forceps. Once cleaned, TM visualized under microscope. Normal appearing TM, nicely aerated middle ear space.     Assessment and Plan:  1. Impacted cerumen of left ear (Primary)  The patient presents for recheck of left ear. Left cerumen impaction cleaned under microscopy today. Healthy ear exam after cleaning.  Return as needed for cleaning.     Gladys Redman DNP, APRN, CNP  Otolaryngology  Head & Neck Surgery  [phone]    10 minutes spent on the date of the encounter doing chart review, history and exam, documentation and further activities per the note excluding time spent examining and cleaning ears under microscopy.      Again, thank you for allowing me to participate in the care of your patient.      Sincerely,    Anais Redman, NP

## 2025-01-21 NOTE — PROGRESS NOTES
Otolaryngology Clinic  January 21, 2025    HPI:  Chelsea Stein is here for a left ear cleaning. History of left cerumen impaction. Last seen in clinic 3/7/23 for ear cleaning.      Today, patient denies any otalgia, otorrhea or change in hearing. Left ear is intermittently plugged.     Otologic microscope exam:    Patient's ear pathology required use of the binocular microscope for the purpose of cleaning and improving visualization in order to assure a more accurate diagnostic evaluation.    Right ear was examined under the microscope.  Normal appearing TM, nicely aerated middle ear space.     Left ear was also examined under the microscope. Almost complete cerumen impaction. It was cleaned with right angle hook, suction, and alligator forceps. Once cleaned, TM visualized under microscope. Normal appearing TM, nicely aerated middle ear space.     Assessment and Plan:  1. Impacted cerumen of left ear (Primary)  The patient presents for recheck of left ear. Left cerumen impaction cleaned under microscopy today. Healthy ear exam after cleaning.  Return as needed for cleaning.     Gladys Redman DNP, APRN, CNP  Otolaryngology  Head & Neck Surgery  [phone]    10 minutes spent on the date of the encounter doing chart review, history and exam, documentation and further activities per the note excluding time spent examining and cleaning ears under microscopy.

## 2025-02-24 NOTE — NURSING NOTE
Chief Complaint   Patient presents with     Consult     consult lupus     Varinder Bee MA   In an effort to ensure that our patients [employer], a Team Member has reviewed your chart and identified an opportunity to provide the best care possible. An attempt was made to discuss or schedule due or overdue Preventive or Chronic Condition care.Care Gaps identified: Wellness Visits.    The Outcome was Contact was not made, no answer/busy. We are attempting to schedule a yearly wellness visit. If you have any questions or need help with scheduling, contact our Health Outreach Team at [phone].   Type of Appointment needed: Primary Care Visit